# Patient Record
Sex: FEMALE | Race: WHITE | Employment: OTHER | URBAN - METROPOLITAN AREA
[De-identification: names, ages, dates, MRNs, and addresses within clinical notes are randomized per-mention and may not be internally consistent; named-entity substitution may affect disease eponyms.]

---

## 2018-10-10 ENCOUNTER — HOSPITAL ENCOUNTER (EMERGENCY)
Age: 63
Discharge: HOME | End: 2018-10-10
Attending: EMERGENCY MEDICINE
Payer: OTHER GOVERNMENT

## 2018-10-10 VITALS
HEIGHT: 65 IN | DIASTOLIC BLOOD PRESSURE: 88 MMHG | HEART RATE: 80 BPM | BODY MASS INDEX: 31.65 KG/M2 | SYSTOLIC BLOOD PRESSURE: 180 MMHG | OXYGEN SATURATION: 100 % | TEMPERATURE: 98.1 F | RESPIRATION RATE: 18 BRPM | WEIGHT: 190 LBS

## 2018-10-10 DIAGNOSIS — H10.213 CHEMICAL CONJUNCTIVITIS OF BOTH EYES: Primary | ICD-10-CM

## 2018-10-10 DIAGNOSIS — S80.01XA CONTUSION OF RIGHT KNEE, INITIAL ENCOUNTER: ICD-10-CM

## 2018-10-10 PROCEDURE — 99283 EMERGENCY DEPT VISIT LOW MDM: CPT

## 2018-10-10 RX ORDER — BACLOFEN 10 MG/1
10 TABLET ORAL NIGHTLY
COMMUNITY
End: 2021-03-11 | Stop reason: ALTCHOICE

## 2018-10-10 ASSESSMENT — PAIN DESCRIPTION - DESCRIPTORS: DESCRIPTORS: BURNING

## 2018-10-10 ASSESSMENT — PAIN SCALES - GENERAL: PAINLEVEL_OUTOF10: 8

## 2018-10-10 ASSESSMENT — PAIN DESCRIPTION - PAIN TYPE: TYPE: ACUTE PAIN

## 2019-02-09 ENCOUNTER — APPOINTMENT (OUTPATIENT)
Dept: GENERAL RADIOLOGY | Age: 64
End: 2019-02-09
Payer: OTHER GOVERNMENT

## 2019-02-09 ENCOUNTER — HOSPITAL ENCOUNTER (EMERGENCY)
Age: 64
Discharge: HOME | End: 2019-02-09
Payer: OTHER GOVERNMENT

## 2019-02-09 VITALS
RESPIRATION RATE: 18 BRPM | HEIGHT: 65 IN | TEMPERATURE: 98.1 F | DIASTOLIC BLOOD PRESSURE: 76 MMHG | WEIGHT: 190 LBS | OXYGEN SATURATION: 100 % | HEART RATE: 88 BPM | BODY MASS INDEX: 31.65 KG/M2 | SYSTOLIC BLOOD PRESSURE: 148 MMHG

## 2019-02-09 DIAGNOSIS — E86.0 DEHYDRATION: Primary | ICD-10-CM

## 2019-02-09 DIAGNOSIS — N28.9 RENAL INSUFFICIENCY: ICD-10-CM

## 2019-02-09 LAB
ALBUMIN SERPL-MCNC: 4.2 G/DL (ref 3.5–5.2)
ALP BLD-CCNC: 68 U/L (ref 35–104)
ALT SERPL-CCNC: 7 U/L (ref 0–32)
ANION GAP SERPL CALCULATED.3IONS-SCNC: 16 MMOL/L (ref 7–16)
AST SERPL-CCNC: 12 U/L (ref 0–31)
BACTERIA: ABNORMAL /HPF
BASOPHILS ABSOLUTE: 0.1 E9/L (ref 0–0.2)
BASOPHILS RELATIVE PERCENT: 0.9 % (ref 0–2)
BILIRUB SERPL-MCNC: 0.6 MG/DL (ref 0–1.2)
BILIRUBIN URINE: NEGATIVE
BLOOD, URINE: NEGATIVE
BUN BLDV-MCNC: 50 MG/DL (ref 8–23)
CALCIUM SERPL-MCNC: 10.2 MG/DL (ref 8.6–10.2)
CHLORIDE BLD-SCNC: 102 MMOL/L (ref 98–107)
CLARITY: CLEAR
CO2: 18 MMOL/L (ref 22–29)
COLOR: YELLOW
CREAT SERPL-MCNC: 1.9 MG/DL (ref 0.5–1)
D DIMER: 270 NG/ML DDU
EOSINOPHILS ABSOLUTE: 0.23 E9/L (ref 0.05–0.5)
EOSINOPHILS RELATIVE PERCENT: 2 % (ref 0–6)
GFR AFRICAN AMERICAN: 32
GFR NON-AFRICAN AMERICAN: 27 ML/MIN/1.73
GLUCOSE BLD-MCNC: 186 MG/DL (ref 74–99)
GLUCOSE URINE: NEGATIVE MG/DL
HCT VFR BLD CALC: 39.9 % (ref 34–48)
HEMOGLOBIN: 12.6 G/DL (ref 11.5–15.5)
IMMATURE GRANULOCYTES #: 0.03 E9/L
IMMATURE GRANULOCYTES %: 0.3 % (ref 0–5)
INFLUENZA A BY PCR: NOT DETECTED
INFLUENZA B BY PCR: NOT DETECTED
KETONES, URINE: ABNORMAL MG/DL
LACTIC ACID: 1.7 MMOL/L (ref 0.5–2.2)
LACTIC ACID: 2.7 MMOL/L (ref 0.5–2.2)
LEUKOCYTE ESTERASE, URINE: NEGATIVE
LIPASE: 25 U/L (ref 13–60)
LYMPHOCYTES ABSOLUTE: 2.13 E9/L (ref 1.5–4)
LYMPHOCYTES RELATIVE PERCENT: 19 % (ref 20–42)
MAGNESIUM: 1.7 MG/DL (ref 1.6–2.6)
MCH RBC QN AUTO: 28 PG (ref 26–35)
MCHC RBC AUTO-ENTMCNC: 31.6 % (ref 32–34.5)
MCV RBC AUTO: 88.7 FL (ref 80–99.9)
METER GLUCOSE: 190 MG/DL (ref 74–99)
MONOCYTES ABSOLUTE: 0.69 E9/L (ref 0.1–0.95)
MONOCYTES RELATIVE PERCENT: 6.1 % (ref 2–12)
NEUTROPHILS ABSOLUTE: 8.06 E9/L (ref 1.8–7.3)
NEUTROPHILS RELATIVE PERCENT: 71.7 % (ref 43–80)
NITRITE, URINE: NEGATIVE
PDW BLD-RTO: 13.9 FL (ref 11.5–15)
PH UA: 5 (ref 5–9)
PLATELET # BLD: 322 E9/L (ref 130–450)
PMV BLD AUTO: 11.8 FL (ref 7–12)
POTASSIUM SERPL-SCNC: 4.8 MMOL/L (ref 3.5–5)
PROTEIN UA: NEGATIVE MG/DL
RBC # BLD: 4.5 E12/L (ref 3.5–5.5)
RBC UA: ABNORMAL /HPF (ref 0–2)
SODIUM BLD-SCNC: 136 MMOL/L (ref 132–146)
SPECIFIC GRAVITY UA: 1.02 (ref 1–1.03)
TOTAL PROTEIN: 7.1 G/DL (ref 6.4–8.3)
TROPONIN: <0.01 NG/ML (ref 0–0.03)
UROBILINOGEN, URINE: 0.2 E.U./DL
WBC # BLD: 11.2 E9/L (ref 4.5–11.5)
WBC UA: ABNORMAL /HPF (ref 0–5)

## 2019-02-09 PROCEDURE — 71045 X-RAY EXAM CHEST 1 VIEW: CPT

## 2019-02-09 PROCEDURE — 36415 COLL VENOUS BLD VENIPUNCTURE: CPT

## 2019-02-09 PROCEDURE — 84484 ASSAY OF TROPONIN QUANT: CPT

## 2019-02-09 PROCEDURE — 96361 HYDRATE IV INFUSION ADD-ON: CPT

## 2019-02-09 PROCEDURE — 87502 INFLUENZA DNA AMP PROBE: CPT

## 2019-02-09 PROCEDURE — 99285 EMERGENCY DEPT VISIT HI MDM: CPT

## 2019-02-09 PROCEDURE — 85025 COMPLETE CBC W/AUTO DIFF WBC: CPT

## 2019-02-09 PROCEDURE — 83690 ASSAY OF LIPASE: CPT

## 2019-02-09 PROCEDURE — 81001 URINALYSIS AUTO W/SCOPE: CPT

## 2019-02-09 PROCEDURE — 82962 GLUCOSE BLOOD TEST: CPT

## 2019-02-09 PROCEDURE — 2580000003 HC RX 258: Performed by: PHYSICIAN ASSISTANT

## 2019-02-09 PROCEDURE — 83605 ASSAY OF LACTIC ACID: CPT

## 2019-02-09 PROCEDURE — 85378 FIBRIN DEGRADE SEMIQUANT: CPT

## 2019-02-09 PROCEDURE — 80053 COMPREHEN METABOLIC PANEL: CPT

## 2019-02-09 PROCEDURE — 96360 HYDRATION IV INFUSION INIT: CPT

## 2019-02-09 PROCEDURE — 83735 ASSAY OF MAGNESIUM: CPT

## 2019-02-09 RX ORDER — LISINOPRIL 10 MG/1
20 TABLET ORAL DAILY
COMMUNITY
End: 2022-11-03

## 2019-02-09 RX ORDER — 0.9 % SODIUM CHLORIDE 0.9 %
1000 INTRAVENOUS SOLUTION INTRAVENOUS ONCE
Status: COMPLETED | OUTPATIENT
Start: 2019-02-09 | End: 2019-02-09

## 2019-02-09 RX ADMIN — SODIUM CHLORIDE 1000 ML: 9 INJECTION, SOLUTION INTRAVENOUS at 20:01

## 2019-02-09 RX ADMIN — SODIUM CHLORIDE 1000 ML: 9 INJECTION, SOLUTION INTRAVENOUS at 18:17

## 2019-02-16 LAB
EKG ATRIAL RATE: 74 BPM
EKG P AXIS: 34 DEGREES
EKG P-R INTERVAL: 160 MS
EKG Q-T INTERVAL: 396 MS
EKG QRS DURATION: 74 MS
EKG QTC CALCULATION (BAZETT): 439 MS
EKG R AXIS: -25 DEGREES
EKG T AXIS: 48 DEGREES
EKG VENTRICULAR RATE: 74 BPM

## 2020-04-09 ENCOUNTER — NURSE TRIAGE (OUTPATIENT)
Dept: OTHER | Facility: CLINIC | Age: 65
End: 2020-04-09

## 2020-04-09 ENCOUNTER — APPOINTMENT (OUTPATIENT)
Dept: GENERAL RADIOLOGY | Age: 65
End: 2020-04-09
Payer: OTHER GOVERNMENT

## 2020-04-09 ENCOUNTER — TELEPHONE (OUTPATIENT)
Dept: ADMINISTRATIVE | Age: 65
End: 2020-04-09

## 2020-04-09 ENCOUNTER — HOSPITAL ENCOUNTER (EMERGENCY)
Age: 65
Discharge: HOME | End: 2020-04-09
Attending: FAMILY MEDICINE
Payer: OTHER GOVERNMENT

## 2020-04-09 VITALS
RESPIRATION RATE: 18 BRPM | HEIGHT: 66 IN | OXYGEN SATURATION: 100 % | SYSTOLIC BLOOD PRESSURE: 168 MMHG | DIASTOLIC BLOOD PRESSURE: 78 MMHG | WEIGHT: 172 LBS | BODY MASS INDEX: 27.64 KG/M2 | HEART RATE: 70 BPM | TEMPERATURE: 97.8 F

## 2020-04-09 LAB
ALBUMIN SERPL-MCNC: 4 G/DL (ref 3.5–5.2)
ALP BLD-CCNC: 82 U/L (ref 35–104)
ALT SERPL-CCNC: 7 U/L (ref 0–32)
ANION GAP SERPL CALCULATED.3IONS-SCNC: 14 MMOL/L (ref 7–16)
ANION GAP SERPL CALCULATED.3IONS-SCNC: 18 MMOL/L (ref 7–16)
AST SERPL-CCNC: 11 U/L (ref 0–31)
BACTERIA: ABNORMAL /HPF
BASOPHILS ABSOLUTE: 0.09 E9/L (ref 0–0.2)
BASOPHILS RELATIVE PERCENT: 0.9 % (ref 0–2)
BETA-HYDROXYBUTYRATE: <0.01 MMOL/L (ref 0.02–0.27)
BILIRUB SERPL-MCNC: 1.4 MG/DL (ref 0–1.2)
BILIRUBIN URINE: NEGATIVE
BLOOD, URINE: ABNORMAL
BUN BLDV-MCNC: 47 MG/DL (ref 8–23)
BUN BLDV-MCNC: 50 MG/DL (ref 8–23)
CALCIUM SERPL-MCNC: 8.8 MG/DL (ref 8.6–10.2)
CALCIUM SERPL-MCNC: 9.2 MG/DL (ref 8.6–10.2)
CHLORIDE BLD-SCNC: 88 MMOL/L (ref 98–107)
CHLORIDE BLD-SCNC: 97 MMOL/L (ref 98–107)
CHP ED QC CHECK: YES
CHP ED QC CHECK: YES
CLARITY: ABNORMAL
CO2: 23 MMOL/L (ref 22–29)
CO2: 24 MMOL/L (ref 22–29)
COLOR: ABNORMAL
CREAT SERPL-MCNC: 1.9 MG/DL (ref 0.5–1)
CREAT SERPL-MCNC: 2.1 MG/DL (ref 0.5–1)
EKG ATRIAL RATE: 80 BPM
EKG P AXIS: 64 DEGREES
EKG P-R INTERVAL: 188 MS
EKG Q-T INTERVAL: 386 MS
EKG QRS DURATION: 78 MS
EKG QTC CALCULATION (BAZETT): 442 MS
EKG R AXIS: -12 DEGREES
EKG T AXIS: 48 DEGREES
EKG VENTRICULAR RATE: 79 BPM
EOSINOPHILS ABSOLUTE: 0.14 E9/L (ref 0.05–0.5)
EOSINOPHILS RELATIVE PERCENT: 1.4 % (ref 0–6)
EPITHELIAL CELLS, UA: ABNORMAL /HPF
GFR AFRICAN AMERICAN: 29
GFR AFRICAN AMERICAN: 32
GFR NON-AFRICAN AMERICAN: 24 ML/MIN/1.73
GFR NON-AFRICAN AMERICAN: 27 ML/MIN/1.73
GLUCOSE BLD-MCNC: 287 MG/DL
GLUCOSE BLD-MCNC: 291 MG/DL (ref 74–99)
GLUCOSE BLD-MCNC: 486 MG/DL
GLUCOSE BLD-MCNC: 495 MG/DL (ref 74–99)
GLUCOSE URINE: >=1000 MG/DL
HCT VFR BLD CALC: 39.7 % (ref 34–48)
HEMOGLOBIN: 13.1 G/DL (ref 11.5–15.5)
HYALINE CASTS: ABNORMAL /LPF (ref 0–2)
IMMATURE GRANULOCYTES #: 0.02 E9/L
IMMATURE GRANULOCYTES %: 0.2 % (ref 0–5)
KETONES, URINE: 15 MG/DL
LACTIC ACID: 1.2 MMOL/L (ref 0.5–2.2)
LEUKOCYTE ESTERASE, URINE: NEGATIVE
LIPASE: 17 U/L (ref 13–60)
LYMPHOCYTES ABSOLUTE: 2.75 E9/L (ref 1.5–4)
LYMPHOCYTES RELATIVE PERCENT: 27.3 % (ref 20–42)
MCH RBC QN AUTO: 29.4 PG (ref 26–35)
MCHC RBC AUTO-ENTMCNC: 33 % (ref 32–34.5)
MCV RBC AUTO: 89 FL (ref 80–99.9)
METER GLUCOSE: 287 MG/DL (ref 74–99)
METER GLUCOSE: 486 MG/DL (ref 74–99)
MONOCYTES ABSOLUTE: 0.77 E9/L (ref 0.1–0.95)
MONOCYTES RELATIVE PERCENT: 7.7 % (ref 2–12)
NEUTROPHILS ABSOLUTE: 6.29 E9/L (ref 1.8–7.3)
NEUTROPHILS RELATIVE PERCENT: 62.5 % (ref 43–80)
NITRITE, URINE: NEGATIVE
PDW BLD-RTO: 13.2 FL (ref 11.5–15)
PH UA: 5 (ref 5–9)
PLATELET # BLD: 284 E9/L (ref 130–450)
PMV BLD AUTO: 11.9 FL (ref 7–12)
POTASSIUM SERPL-SCNC: 3.9 MMOL/L (ref 3.5–5)
POTASSIUM SERPL-SCNC: 4.6 MMOL/L (ref 3.5–5)
PROTEIN UA: ABNORMAL MG/DL
RBC # BLD: 4.46 E12/L (ref 3.5–5.5)
RBC UA: ABNORMAL /HPF (ref 0–2)
REASON FOR REJECTION: NORMAL
REJECTED TEST: NORMAL
SODIUM BLD-SCNC: 129 MMOL/L (ref 132–146)
SODIUM BLD-SCNC: 135 MMOL/L (ref 132–146)
SPECIFIC GRAVITY UA: 1.02 (ref 1–1.03)
TOTAL PROTEIN: 6.6 G/DL (ref 6.4–8.3)
TROPONIN: 0.01 NG/ML (ref 0–0.03)
UROBILINOGEN, URINE: 0.2 E.U./DL
WBC # BLD: 10.1 E9/L (ref 4.5–11.5)
WBC UA: ABNORMAL /HPF (ref 0–5)

## 2020-04-09 PROCEDURE — 71046 X-RAY EXAM CHEST 2 VIEWS: CPT

## 2020-04-09 PROCEDURE — 87088 URINE BACTERIA CULTURE: CPT

## 2020-04-09 PROCEDURE — 80048 BASIC METABOLIC PNL TOTAL CA: CPT

## 2020-04-09 PROCEDURE — 6360000002 HC RX W HCPCS: Performed by: NURSE PRACTITIONER

## 2020-04-09 PROCEDURE — 85025 COMPLETE CBC W/AUTO DIFF WBC: CPT

## 2020-04-09 PROCEDURE — 2580000003 HC RX 258: Performed by: NURSE PRACTITIONER

## 2020-04-09 PROCEDURE — 93005 ELECTROCARDIOGRAM TRACING: CPT | Performed by: NURSE PRACTITIONER

## 2020-04-09 PROCEDURE — 93010 ELECTROCARDIOGRAM REPORT: CPT | Performed by: INTERNAL MEDICINE

## 2020-04-09 PROCEDURE — 6370000000 HC RX 637 (ALT 250 FOR IP): Performed by: NURSE PRACTITIONER

## 2020-04-09 PROCEDURE — 83690 ASSAY OF LIPASE: CPT

## 2020-04-09 PROCEDURE — 96375 TX/PRO/DX INJ NEW DRUG ADDON: CPT

## 2020-04-09 PROCEDURE — 83605 ASSAY OF LACTIC ACID: CPT

## 2020-04-09 PROCEDURE — 80053 COMPREHEN METABOLIC PANEL: CPT

## 2020-04-09 PROCEDURE — 84484 ASSAY OF TROPONIN QUANT: CPT

## 2020-04-09 PROCEDURE — 81001 URINALYSIS AUTO W/SCOPE: CPT

## 2020-04-09 PROCEDURE — 36415 COLL VENOUS BLD VENIPUNCTURE: CPT

## 2020-04-09 PROCEDURE — 94760 N-INVAS EAR/PLS OXIMETRY 1: CPT

## 2020-04-09 PROCEDURE — 99284 EMERGENCY DEPT VISIT MOD MDM: CPT

## 2020-04-09 PROCEDURE — 82962 GLUCOSE BLOOD TEST: CPT

## 2020-04-09 PROCEDURE — 82010 KETONE BODYS QUAN: CPT

## 2020-04-09 PROCEDURE — 96365 THER/PROPH/DIAG IV INF INIT: CPT

## 2020-04-09 RX ORDER — 0.9 % SODIUM CHLORIDE 0.9 %
1000 INTRAVENOUS SOLUTION INTRAVENOUS ONCE
Status: COMPLETED | OUTPATIENT
Start: 2020-04-09 | End: 2020-04-09

## 2020-04-09 RX ORDER — INSULIN GLARGINE 100 [IU]/ML
INJECTION, SOLUTION SUBCUTANEOUS NIGHTLY
COMMUNITY
End: 2021-03-11 | Stop reason: ALTCHOICE

## 2020-04-09 RX ORDER — CEFDINIR 300 MG/1
300 CAPSULE ORAL 2 TIMES DAILY
Qty: 10 CAPSULE | Refills: 0 | Status: SHIPPED | OUTPATIENT
Start: 2020-04-09 | End: 2020-04-14

## 2020-04-09 RX ADMIN — SODIUM CHLORIDE 1000 ML: 9 INJECTION, SOLUTION INTRAVENOUS at 15:16

## 2020-04-09 RX ADMIN — CEFTRIAXONE SODIUM 1 G: 1 INJECTION, POWDER, FOR SOLUTION INTRAMUSCULAR; INTRAVENOUS at 17:37

## 2020-04-09 RX ADMIN — INSULIN HUMAN 6 UNITS: 100 INJECTION, SOLUTION PARENTERAL at 16:53

## 2020-04-09 RX ADMIN — SODIUM CHLORIDE 1000 ML: 9 INJECTION, SOLUTION INTRAVENOUS at 16:42

## 2020-04-09 NOTE — ED PROVIDER NOTES
13.1 11.5 - 15.5 g/dL    Hematocrit 39.7 34.0 - 48.0 %    MCV 89.0 80.0 - 99.9 fL    MCH 29.4 26.0 - 35.0 pg    MCHC 33.0 32.0 - 34.5 %    RDW 13.2 11.5 - 15.0 fL    Platelets 149 174 - 179 E9/L    MPV 11.9 7.0 - 12.0 fL    Neutrophils % 62.5 43.0 - 80.0 %    Immature Granulocytes % 0.2 0.0 - 5.0 %    Lymphocytes % 27.3 20.0 - 42.0 %    Monocytes % 7.7 2.0 - 12.0 %    Eosinophils % 1.4 0.0 - 6.0 %    Basophils % 0.9 0.0 - 2.0 %    Neutrophils Absolute 6.29 1.80 - 7.30 E9/L    Immature Granulocytes # 0.02 E9/L    Lymphocytes Absolute 2.75 1.50 - 4.00 E9/L    Monocytes Absolute 0.77 0.10 - 0.95 E9/L    Eosinophils Absolute 0.14 0.05 - 0.50 E9/L    Basophils Absolute 0.09 0.00 - 0.20 E9/L   Urinalysis   Result Value Ref Range    Color, UA DARK YELLOW (A) Straw/Yellow    Clarity, UA SL CLOUDY Clear    Glucose, Ur >=1000 (A) Negative mg/dL    Bilirubin Urine Negative Negative    Ketones, Urine 15 (A) Negative mg/dL    Specific Gravity, UA 1.025 1.005 - 1.030    Blood, Urine TRACE (A) Negative    pH, UA 5.0 5.0 - 9.0    Protein, UA TRACE Negative mg/dL    Urobilinogen, Urine 0.2 <2.0 E.U./dL    Nitrite, Urine Negative Negative    Leukocyte Esterase, Urine Negative Negative   Microscopic Urinalysis   Result Value Ref Range    Hyaline Casts, UA 6-10 (A) 0 - 2 /LPF    WBC, UA 5-10 (A) 0 - 5 /HPF    RBC, UA 2-5 0 - 2 /HPF    Epithelial Cells, UA MODERATE /HPF    Bacteria, UA MODERATE (A) None Seen /HPF   SPECIMEN REJECTION   Result Value Ref Range    Rejected Test cmplipastroplac     Reason for Rejection see below    Troponin   Result Value Ref Range    Troponin 0.01 0.00 - 0.03 ng/mL   Beta-Hydroxybutyrate   Result Value Ref Range    Beta-Hydroxybutyrate <0.01 (L) 0.02 - 0.27 mmol/L   Comprehensive Metabolic Panel   Result Value Ref Range    Sodium 129 (L) 132 - 146 mmol/L    Potassium 4.6 3.5 - 5.0 mmol/L    Chloride 88 (L) 98 - 107 mmol/L    CO2 23 22 - 29 mmol/L    Anion Gap 18 (H) 7 - 16 mmol/L    Glucose 495 (H) 74 - 99

## 2020-04-12 LAB — URINE CULTURE, ROUTINE: NORMAL

## 2020-07-09 ENCOUNTER — OFFICE VISIT (OUTPATIENT)
Dept: VASCULAR SURGERY | Age: 65
End: 2020-07-09
Payer: MEDICARE

## 2020-07-09 ENCOUNTER — TELEPHONE (OUTPATIENT)
Dept: VASCULAR SURGERY | Age: 65
End: 2020-07-09

## 2020-07-09 VITALS
DIASTOLIC BLOOD PRESSURE: 51 MMHG | BODY MASS INDEX: 23.99 KG/M2 | WEIGHT: 144 LBS | SYSTOLIC BLOOD PRESSURE: 98 MMHG | HEART RATE: 81 BPM | HEIGHT: 65 IN

## 2020-07-09 PROBLEM — R09.89 BRUIT OF LEFT CAROTID ARTERY: Status: ACTIVE | Noted: 2020-07-09

## 2020-07-09 PROBLEM — I65.22 LEFT CAROTID STENOSIS: Status: ACTIVE | Noted: 2020-07-09

## 2020-07-09 PROBLEM — R09.89 DECREASED DORSALIS PEDIS PULSE: Status: ACTIVE | Noted: 2020-07-09

## 2020-07-09 PROCEDURE — 99204 OFFICE O/P NEW MOD 45 MIN: CPT | Performed by: SURGERY

## 2020-07-09 NOTE — TELEPHONE ENCOUNTER
Notified patient of lower extremity arterial doppler study at [address] [place] [address] Dr Marx on 7-28-20 at 8:00 am.  [place] at 7:30 am.

## 2020-07-09 NOTE — PROGRESS NOTES
Chief Complaint:   Chief Complaint   Patient presents with    New Patient     Carotid stenosis-US done          HPI: Patient came to the office with her , for the evaluation of abnormal carotid ultrasound that was done 4 months ago that revealed approximately 40 to 60% stenosis, patient was recommended a follow-up evaluation 1 year, concerned and came here    Patient also has multiple complaints and symptoms involving the legs, including cramping of the toes and ankles, burning sensation in the middle of the night or the medial aspect of the thighs, denies any history of back problems     Patient does give history of diabetes      Patient denies any focal lateralizing neurological symptoms like loss of speech, vision or loss of function of extremity    Patient can walk a few blocks slowly, and denies any symptoms of rest pain    Allergies   Allergen Reactions    Synvisc [Hylan G-F 20]      Local swelling       Current Outpatient Medications   Medication Sig Dispense Refill    Liraglutide (VICTOZA SC) Inject into the skin daily      insulin glargine (LANTUS) 100 UNIT/ML injection vial Inject into the skin nightly Takes 18-20 units at bedtime      lisinopril (PRINIVIL;ZESTRIL) 10 MG tablet Take 5 mg by mouth daily       vitamin D (CHOLECALCIFEROL) 1000 UNIT TABS tablet Take 1,000 Units by mouth daily      HYDROcodone-acetaminophen (NORCO)  MG per tablet   Take 1 tablet by mouth every 6 hours as needed for Pain Instructed to take am of procedure if needed      Pediatric Multivitamins-Fl (MULTI VIT/FL PO) Take  by mouth.  paroxetine (PAXIL) 20 MG tablet   Take 20 mg by mouth every morning Instructed to take am of procedure      atenolol (TENORMIN) 25 MG tablet   Take 25 mg by mouth daily Instructed to take am of procedure      alprazolam (XANAX) 1 MG tablet   Take 1 mg by mouth 2 times daily Instructed to take am of procedure      furosemide (LASIX) 20 MG tablet Take 20 mg by mouth daily.  aspirin 81 MG EC tablet Take 81 mg by mouth daily.  baclofen (LIORESAL) 10 MG tablet Take 10 mg by mouth nightly        No current facility-administered medications for this visit. Past Medical History:   Diagnosis Date    Anxiety     Bruit of left carotid artery 7/9/2020    Decreased dorsalis pedis pulse 7/9/2020    Humeral fracture     right    Hypertension     Left carotid stenosis 7/9/2020    Osteoarthritis of right knee 5/4/2011    Type II or unspecified type diabetes mellitus without mention of complication, not stated as uncontrolled        Past Surgical History:   Procedure Laterality Date    APPENDECTOMY      CHOLECYSTECTOMY      FRACTURE SURGERY Right     arm    KNEE ARTHROPLASTY Right 2011    Right TKA. Adam Guajardo MD       Family History   Problem Relation Age of Onset    Diabetes Mother     Arthritis Father     Diabetes Father        Social History     Socioeconomic History    Marital status:      Spouse name: Not on file    Number of children: Not on file    Years of education: Not on file    Highest education level: Not on file   Occupational History    Not on file   Social Needs    Financial resource strain: Not on file    Food insecurity     Worry: Not on file     Inability: Not on file    Transportation needs     Medical: Not on file     Non-medical: Not on file   Tobacco Use    Smoking status: Never Smoker    Smokeless tobacco: Never Used   Substance and Sexual Activity    Alcohol use:  Yes     Alcohol/week: 0.0 standard drinks     Comment: occasional     Drug use: No    Sexual activity: Not on file   Lifestyle    Physical activity     Days per week: Not on file     Minutes per session: Not on file    Stress: Not on file   Relationships    Social connections     Talks on phone: Not on file     Gets together: Not on file     Attends [employer] service: Not on file     Active member of club or organization: Not on file     Attends 2  2=diminished   Popliteal    1=barely palpable   Dorsalis pedis 1 1  0=absent   Posterior tibial    4=aneurysmal             Other pertinent information:1. The past medical records were reviewed. 2.  Recent carotid ultrasound done at the [facility] in [place] revealed normal study on the right, 40 to 60% stenosis on the left side    Assessment:    1. Left carotid stenosis    2. Bruit of left carotid artery    3. Decreased dorsalis pedis pulse              Plan:       I had a detailed discussion the patient and the , the patient is asymptomatic, patient was reassured, recommended to continue the low-dose aspirin and call me if any neurologic symptoms and follow-up evaluation 1 year from the last ultrasound study     And as the patient does have diminished pulses in the feet, patient recommended lower extremity artery Doppler study for the documentation          Patient was instructed to continue walking program and to call if any worsening of symptoms and to call if any focal lateralizing neurological symptoms like loss of speech, vision or loss of function of extremity. All the questions were answered. Orders Placed This Encounter   Procedures    VL LOWER EXTREMITY ARTERIAL SEGMENTAL PRESSURES W PPG BILATERAL             Indicated follow-up: Return in about 8 months (around 3/9/2021), or if symptoms worsen or fail to improve.

## 2020-07-28 ENCOUNTER — HOSPITAL ENCOUNTER (OUTPATIENT)
Dept: INTERVENTIONAL RADIOLOGY/VASCULAR | Age: 65
Discharge: HOME | End: 2020-07-30
Payer: MEDICARE

## 2020-07-28 PROCEDURE — 93923 UPR/LXTR ART STDY 3+ LVLS: CPT

## 2020-07-30 ENCOUNTER — TELEPHONE (OUTPATIENT)
Dept: VASCULAR SURGERY | Age: 65
End: 2020-07-30

## 2020-07-30 NOTE — TELEPHONE ENCOUNTER
Discussed with patient regarding ankle-brachial index, normal good arterial flow to both feet, patient was reassured, to call me PRN, all her questions were answered

## 2021-02-22 ENCOUNTER — HOSPITAL ENCOUNTER (OUTPATIENT)
Age: 66
Discharge: HOME | End: 2021-02-22
Payer: MEDICARE

## 2021-02-22 LAB
ANION GAP SERPL CALCULATED.3IONS-SCNC: 7 MMOL/L (ref 7–16)
BUN BLDV-MCNC: 33 MG/DL (ref 8–23)
CALCIUM SERPL-MCNC: 9.2 MG/DL (ref 8.6–10.2)
CHLORIDE BLD-SCNC: 98 MMOL/L (ref 98–107)
CO2: 27 MMOL/L (ref 22–29)
CREAT SERPL-MCNC: 1.2 MG/DL (ref 0.5–1)
GFR AFRICAN AMERICAN: 54
GFR NON-AFRICAN AMERICAN: 45 ML/MIN/1.73
GLUCOSE BLD-MCNC: 338 MG/DL (ref 74–99)
POTASSIUM SERPL-SCNC: 4.6 MMOL/L (ref 3.5–5)
SODIUM BLD-SCNC: 132 MMOL/L (ref 132–146)

## 2021-02-22 PROCEDURE — 80048 BASIC METABOLIC PNL TOTAL CA: CPT

## 2021-02-22 PROCEDURE — 36415 COLL VENOUS BLD VENIPUNCTURE: CPT

## 2021-03-11 ENCOUNTER — OFFICE VISIT (OUTPATIENT)
Dept: VASCULAR SURGERY | Age: 66
End: 2021-03-11
Payer: MEDICARE

## 2021-03-11 VITALS — HEIGHT: 65 IN | WEIGHT: 145 LBS | BODY MASS INDEX: 24.16 KG/M2

## 2021-03-11 DIAGNOSIS — I65.22 LEFT CAROTID STENOSIS: Primary | ICD-10-CM

## 2021-03-11 DIAGNOSIS — R09.89 BRUIT OF LEFT CAROTID ARTERY: ICD-10-CM

## 2021-03-11 PROCEDURE — 99213 OFFICE O/P EST LOW 20 MIN: CPT | Performed by: SURGERY

## 2021-03-11 RX ORDER — CILOSTAZOL 100 MG/1
100 TABLET ORAL 2 TIMES DAILY
COMMUNITY
End: 2021-10-07

## 2021-03-11 RX ORDER — MAGNESIUM OXIDE 400 MG/1
400 TABLET ORAL DAILY
COMMUNITY

## 2021-03-11 RX ORDER — METHOCARBAMOL 500 MG/1
500 TABLET, FILM COATED ORAL 4 TIMES DAILY
COMMUNITY
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
## 2021-03-12 ENCOUNTER — HOSPITAL ENCOUNTER (OUTPATIENT)
Age: 66
Discharge: HOME | End: 2021-03-12
Payer: MEDICARE

## 2021-03-12 ENCOUNTER — HOSPITAL ENCOUNTER (OUTPATIENT)
Dept: ULTRASOUND IMAGING | Age: 66
Discharge: HOME | End: 2021-03-14
Payer: MEDICARE

## 2021-03-12 DIAGNOSIS — N18.31 STAGE 3A CHRONIC KIDNEY DISEASE (HCC): ICD-10-CM

## 2021-03-12 DIAGNOSIS — I12.9 HYPERTENSIVE CHRONIC KIDNEY DISEASE, UNSPECIFIED CKD STAGE: ICD-10-CM

## 2021-03-12 DIAGNOSIS — I10 ESSENTIAL HYPERTENSION: ICD-10-CM

## 2021-03-12 LAB
ALBUMIN SERPL-MCNC: 3.9 G/DL (ref 3.5–5.2)
ALP BLD-CCNC: 74 U/L (ref 35–104)
ALT SERPL-CCNC: 15 U/L (ref 0–32)
ANION GAP SERPL CALCULATED.3IONS-SCNC: 10 MMOL/L (ref 7–16)
AST SERPL-CCNC: 16 U/L (ref 0–31)
BASOPHILS ABSOLUTE: 0.05 E9/L (ref 0–0.2)
BASOPHILS RELATIVE PERCENT: 0.9 % (ref 0–2)
BILIRUB SERPL-MCNC: 0.4 MG/DL (ref 0–1.2)
BUN BLDV-MCNC: 26 MG/DL (ref 8–23)
CALCIUM SERPL-MCNC: 8.9 MG/DL (ref 8.6–10.2)
CHLORIDE BLD-SCNC: 99 MMOL/L (ref 98–107)
CO2: 24 MMOL/L (ref 22–29)
CREAT SERPL-MCNC: 1.1 MG/DL (ref 0.5–1)
CREATININE URINE: 79 MG/DL (ref 29–226)
EOSINOPHILS ABSOLUTE: 0.34 E9/L (ref 0.05–0.5)
EOSINOPHILS RELATIVE PERCENT: 6.2 % (ref 0–6)
GFR AFRICAN AMERICAN: >60
GFR NON-AFRICAN AMERICAN: 50 ML/MIN/1.73
GLUCOSE BLD-MCNC: 281 MG/DL (ref 74–99)
HCT VFR BLD CALC: 34.8 % (ref 34–48)
HEMOGLOBIN: 11 G/DL (ref 11.5–15.5)
IMMATURE GRANULOCYTES #: 0.02 E9/L
IMMATURE GRANULOCYTES %: 0.4 % (ref 0–5)
LYMPHOCYTES ABSOLUTE: 1.22 E9/L (ref 1.5–4)
LYMPHOCYTES RELATIVE PERCENT: 22.3 % (ref 20–42)
MAGNESIUM: 2.1 MG/DL (ref 1.6–2.6)
MCH RBC QN AUTO: 29.1 PG (ref 26–35)
MCHC RBC AUTO-ENTMCNC: 31.6 % (ref 32–34.5)
MCV RBC AUTO: 92.1 FL (ref 80–99.9)
MONOCYTES ABSOLUTE: 0.37 E9/L (ref 0.1–0.95)
MONOCYTES RELATIVE PERCENT: 6.8 % (ref 2–12)
NEUTROPHILS ABSOLUTE: 3.46 E9/L (ref 1.8–7.3)
NEUTROPHILS RELATIVE PERCENT: 63.4 % (ref 43–80)
PDW BLD-RTO: 14.1 FL (ref 11.5–15)
PHOSPHORUS: 3.7 MG/DL (ref 2.5–4.5)
PLATELET # BLD: 299 E9/L (ref 130–450)
PMV BLD AUTO: 10.6 FL (ref 7–12)
POTASSIUM SERPL-SCNC: 5.2 MMOL/L (ref 3.5–5)
PROTEIN PROTEIN: 8 MG/DL (ref 0–12)
PROTEIN/CREAT RATIO: 0.1
PROTEIN/CREAT RATIO: 0.1 (ref 0–0.2)
RBC # BLD: 3.78 E12/L (ref 3.5–5.5)
SODIUM BLD-SCNC: 133 MMOL/L (ref 132–146)
TOTAL PROTEIN: 5.8 G/DL (ref 6.4–8.3)
URIC ACID, SERUM: 5 MG/DL (ref 2.4–5.7)
WBC # BLD: 5.5 E9/L (ref 4.5–11.5)

## 2021-03-12 PROCEDURE — 80053 COMPREHEN METABOLIC PANEL: CPT

## 2021-03-12 PROCEDURE — 36415 COLL VENOUS BLD VENIPUNCTURE: CPT

## 2021-03-12 PROCEDURE — 93975 VASCULAR STUDY: CPT

## 2021-03-12 PROCEDURE — 85025 COMPLETE CBC W/AUTO DIFF WBC: CPT

## 2021-03-12 PROCEDURE — 84550 ASSAY OF BLOOD/URIC ACID: CPT

## 2021-03-12 PROCEDURE — 93975 VASCULAR STUDY: CPT | Performed by: RADIOLOGY

## 2021-03-12 PROCEDURE — 84156 ASSAY OF PROTEIN URINE: CPT

## 2021-03-12 PROCEDURE — 76770 US EXAM ABDO BACK WALL COMP: CPT | Performed by: RADIOLOGY

## 2021-03-12 PROCEDURE — 84100 ASSAY OF PHOSPHORUS: CPT

## 2021-03-12 PROCEDURE — 83735 ASSAY OF MAGNESIUM: CPT

## 2021-03-12 PROCEDURE — 82570 ASSAY OF URINE CREATININE: CPT

## 2021-03-12 PROCEDURE — 76770 US EXAM ABDO BACK WALL COMP: CPT

## 2021-04-01 ENCOUNTER — TELEPHONE (OUTPATIENT)
Dept: VASCULAR SURGERY | Age: 66
End: 2021-04-01

## 2021-04-05 ENCOUNTER — HOSPITAL ENCOUNTER (OUTPATIENT)
Dept: CARDIOLOGY | Age: 66
Discharge: HOME | End: 2021-04-05
Payer: MEDICARE

## 2021-04-05 DIAGNOSIS — I65.22 LEFT CAROTID STENOSIS: ICD-10-CM

## 2021-04-05 PROCEDURE — 93880 EXTRACRANIAL BILAT STUDY: CPT

## 2021-04-07 ENCOUNTER — TELEPHONE (OUTPATIENT)
Dept: VASCULAR SURGERY | Age: 66
End: 2021-04-07

## 2021-04-08 ENCOUNTER — TELEPHONE (OUTPATIENT)
Dept: VASCULAR SURGERY | Age: 66
End: 2021-04-08

## 2021-08-11 ENCOUNTER — TELEPHONE (OUTPATIENT)
Dept: VASCULAR SURGERY | Age: 66
End: 2021-08-11

## 2021-08-12 ENCOUNTER — OFFICE VISIT (OUTPATIENT)
Dept: VASCULAR SURGERY | Age: 66
End: 2021-08-12
Payer: MEDICARE

## 2021-08-12 ENCOUNTER — TELEPHONE (OUTPATIENT)
Dept: VASCULAR SURGERY | Age: 66
End: 2021-08-12

## 2021-08-12 VITALS — WEIGHT: 146 LBS | HEIGHT: 65 IN | BODY MASS INDEX: 24.32 KG/M2

## 2021-08-12 DIAGNOSIS — R09.89 DECREASED DORSALIS PEDIS PULSE: Primary | ICD-10-CM

## 2021-08-12 DIAGNOSIS — I80.01 SUPERFICIAL PHLEBITIS OF LEG, RIGHT: ICD-10-CM

## 2021-08-12 DIAGNOSIS — I83.811 VARICOSE VEINS OF LEG WITH PAIN, RIGHT: ICD-10-CM

## 2021-08-12 DIAGNOSIS — M79.662 PAIN IN BOTH LOWER LEGS: ICD-10-CM

## 2021-08-12 DIAGNOSIS — M79.661 PAIN IN BOTH LOWER LEGS: ICD-10-CM

## 2021-08-12 PROCEDURE — 99214 OFFICE O/P EST MOD 30 MIN: CPT | Performed by: SURGERY
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
## 2021-08-12 NOTE — TELEPHONE ENCOUNTER
Notified patient of ultrasound and ARMANDO at [facility] on Friday, 9-3-21 at 10:30 am.  [place] at 10:00 am.

## 2021-09-03 ENCOUNTER — HOSPITAL ENCOUNTER (OUTPATIENT)
Dept: INTERVENTIONAL RADIOLOGY/VASCULAR | Age: 66
Discharge: HOME | End: 2021-09-05
Payer: MEDICARE

## 2021-09-03 ENCOUNTER — HOSPITAL ENCOUNTER (OUTPATIENT)
Dept: ULTRASOUND IMAGING | Age: 66
Discharge: HOME | End: 2021-09-05
Payer: MEDICARE

## 2021-09-03 DIAGNOSIS — M79.662 PAIN IN BOTH LOWER LEGS: ICD-10-CM

## 2021-09-03 DIAGNOSIS — R09.89 DECREASED DORSALIS PEDIS PULSE: ICD-10-CM

## 2021-09-03 DIAGNOSIS — I80.01 SUPERFICIAL PHLEBITIS OF LEG, RIGHT: ICD-10-CM

## 2021-09-03 DIAGNOSIS — M79.661 PAIN IN BOTH LOWER LEGS: ICD-10-CM

## 2021-09-03 PROCEDURE — 93970 EXTREMITY STUDY: CPT | Performed by: RADIOLOGY

## 2021-09-03 PROCEDURE — 93922 UPR/L XTREMITY ART 2 LEVELS: CPT

## 2021-09-03 PROCEDURE — 93970 EXTREMITY STUDY: CPT

## 2021-09-07 ENCOUNTER — TELEPHONE (OUTPATIENT)
Dept: VASCULAR SURGERY | Age: 66
End: 2021-09-07

## 2021-09-07 NOTE — TELEPHONE ENCOUNTER
Discussed the patient regarding the vascular ultrasound testing, ankle-brachial index normal, venous ultrasound no DVT, small Baker's cyst, informed her, cramping in the thighs not related to vascular etiology, to discuss with her PCP, undergo complete musculoskeletal, spine evaluation along with electrolytes and minerals evaluation, call as needed

## 2021-09-17 ENCOUNTER — HOSPITAL ENCOUNTER (OUTPATIENT)
Age: 66
Discharge: HOME | End: 2021-09-17
Payer: MEDICARE

## 2021-09-17 LAB
ALBUMIN SERPL-MCNC: 3.8 G/DL (ref 3.5–5.2)
ALP BLD-CCNC: 95 U/L (ref 35–104)
ALT SERPL-CCNC: 18 U/L (ref 0–32)
ANION GAP SERPL CALCULATED.3IONS-SCNC: 10 MMOL/L (ref 7–16)
AST SERPL-CCNC: 18 U/L (ref 0–31)
BASOPHILS ABSOLUTE: 0.05 E9/L (ref 0–0.2)
BASOPHILS RELATIVE PERCENT: 0.6 % (ref 0–2)
BILIRUB SERPL-MCNC: 0.5 MG/DL (ref 0–1.2)
BUN BLDV-MCNC: 23 MG/DL (ref 6–23)
CALCIUM SERPL-MCNC: 9.1 MG/DL (ref 8.6–10.2)
CHLORIDE BLD-SCNC: 100 MMOL/L (ref 98–107)
CO2: 25 MMOL/L (ref 22–29)
CREAT SERPL-MCNC: 1.1 MG/DL (ref 0.5–1)
CREATININE URINE: 44 MG/DL (ref 29–226)
EOSINOPHILS ABSOLUTE: 0.1 E9/L (ref 0.05–0.5)
EOSINOPHILS RELATIVE PERCENT: 1.3 % (ref 0–6)
GFR AFRICAN AMERICAN: >60
GFR NON-AFRICAN AMERICAN: 50 ML/MIN/1.73
GLUCOSE BLD-MCNC: 231 MG/DL (ref 74–99)
HCT VFR BLD CALC: 34.8 % (ref 34–48)
HEMOGLOBIN: 11.1 G/DL (ref 11.5–15.5)
IMMATURE GRANULOCYTES #: 0.04 E9/L
IMMATURE GRANULOCYTES %: 0.5 % (ref 0–5)
LYMPHOCYTES ABSOLUTE: 1.34 E9/L (ref 1.5–4)
LYMPHOCYTES RELATIVE PERCENT: 17.2 % (ref 20–42)
MAGNESIUM: 2.1 MG/DL (ref 1.6–2.6)
MCH RBC QN AUTO: 29.6 PG (ref 26–35)
MCHC RBC AUTO-ENTMCNC: 31.9 % (ref 32–34.5)
MCV RBC AUTO: 92.8 FL (ref 80–99.9)
MONOCYTES ABSOLUTE: 0.56 E9/L (ref 0.1–0.95)
MONOCYTES RELATIVE PERCENT: 7.2 % (ref 2–12)
NEUTROPHILS ABSOLUTE: 5.69 E9/L (ref 1.8–7.3)
NEUTROPHILS RELATIVE PERCENT: 73.2 % (ref 43–80)
PDW BLD-RTO: 13.2 FL (ref 11.5–15)
PHOSPHORUS: 3.5 MG/DL (ref 2.5–4.5)
PLATELET # BLD: 332 E9/L (ref 130–450)
PMV BLD AUTO: 10.3 FL (ref 7–12)
POTASSIUM SERPL-SCNC: 5 MMOL/L (ref 3.5–5)
PROTEIN PROTEIN: 8 MG/DL (ref 0–12)
PROTEIN/CREAT RATIO: 0.2
PROTEIN/CREAT RATIO: 0.2 (ref 0–0.2)
RBC # BLD: 3.75 E12/L (ref 3.5–5.5)
SODIUM BLD-SCNC: 135 MMOL/L (ref 132–146)
TOTAL PROTEIN: 6.4 G/DL (ref 6.4–8.3)
URIC ACID, SERUM: 5 MG/DL (ref 2.4–5.7)
WBC # BLD: 7.8 E9/L (ref 4.5–11.5)

## 2021-09-17 PROCEDURE — 84100 ASSAY OF PHOSPHORUS: CPT

## 2021-09-17 PROCEDURE — 84550 ASSAY OF BLOOD/URIC ACID: CPT

## 2021-09-17 PROCEDURE — 80053 COMPREHEN METABOLIC PANEL: CPT

## 2021-09-17 PROCEDURE — 82570 ASSAY OF URINE CREATININE: CPT

## 2021-09-17 PROCEDURE — 85025 COMPLETE CBC W/AUTO DIFF WBC: CPT

## 2021-09-17 PROCEDURE — 84156 ASSAY OF PROTEIN URINE: CPT

## 2021-09-17 PROCEDURE — 36415 COLL VENOUS BLD VENIPUNCTURE: CPT

## 2021-09-17 PROCEDURE — 83735 ASSAY OF MAGNESIUM: CPT

## 2021-10-06 ENCOUNTER — TELEPHONE (OUTPATIENT)
Dept: VASCULAR SURGERY | Age: 66
End: 2021-10-06

## 2021-10-07 ENCOUNTER — TELEPHONE (OUTPATIENT)
Dept: VASCULAR SURGERY | Age: 66
End: 2021-10-07

## 2021-10-07 ENCOUNTER — OFFICE VISIT (OUTPATIENT)
Dept: VASCULAR SURGERY | Age: 66
End: 2021-10-07
Payer: MEDICARE

## 2021-10-07 VITALS — WEIGHT: 148 LBS | BODY MASS INDEX: 24.66 KG/M2 | HEIGHT: 65 IN

## 2021-10-07 DIAGNOSIS — M79.662 PAIN IN BOTH LOWER LEGS: ICD-10-CM

## 2021-10-07 DIAGNOSIS — M79.661 PAIN IN BOTH LOWER LEGS: ICD-10-CM

## 2021-10-07 DIAGNOSIS — I65.23 BILATERAL CAROTID ARTERY STENOSIS: ICD-10-CM

## 2021-10-07 DIAGNOSIS — I80.01 SUPERFICIAL PHLEBITIS OF LEG, RIGHT: ICD-10-CM

## 2021-10-07 DIAGNOSIS — I83.811 VARICOSE VEINS OF LEG WITH PAIN, RIGHT: Primary | ICD-10-CM

## 2021-10-07 PROCEDURE — 99214 OFFICE O/P EST MOD 30 MIN: CPT | Performed by: SURGERY

## 2021-10-07 NOTE — TELEPHONE ENCOUNTER
Notified patient of carotid ultrasound at [facility] on Tuesday, 10-26-21 at 1:00 pm, register at 12:30 pm.

## 2021-10-07 NOTE — PROGRESS NOTES
Chief Complaint:   Chief Complaint   Patient presents with    Follow-up     go over letty         HPI: Patient came to the office for the evaluation of multiple vascular issues including bilateral carotid artery disease also history of aching in the leg, pain, history of superficial thrombophlebitis after vein procedures done at the vein center, does not want to go back there for follow-up    Overall the legs are better, with a superficial thrombophlebitis resolving with less pain          Patient denies any focal lateralizing neurological symptoms like loss of speech, vision or loss of function of extremity    Patient can walk a few blocks , and denies any symptoms of rest pain    Allergies   Allergen Reactions    Synvisc [Hylan G-F 20]      Local swelling       Current Outpatient Medications   Medication Sig Dispense Refill    magnesium oxide (MAG-OX) 400 MG tablet Take 400 mg by mouth daily      methocarbamol (ROBAXIN) 500 MG tablet Take 500 mg by mouth 4 times daily      insulin 70-30 (NOVOLIN 70/30) (70-30) 100 UNIT per ML injection vial Inject into the skin 2 times daily      cilostazol (PLETAL) 100 MG tablet Take 100 mg by mouth 2 times daily      lisinopril (PRINIVIL;ZESTRIL) 10 MG tablet Take 20 mg by mouth daily       Pediatric Multivitamins-Fl (MULTI VIT/FL PO) Take  by mouth.  alprazolam (XANAX) 1 MG tablet   Take 1 mg by mouth 2 times daily Instructed to take am of procedure      furosemide (LASIX) 20 MG tablet Take 20 mg by mouth daily.  aspirin 81 MG EC tablet Take 81 mg by mouth daily. No current facility-administered medications for this visit.        Past Medical History:   Diagnosis Date    Anxiety     Bilateral carotid artery stenosis 10/7/2021    Bruit of left carotid artery 7/9/2020    Decreased dorsalis pedis pulse 7/9/2020    Humeral fracture     right    Hypertension     Kidney disease     Left carotid stenosis 7/9/2020    Leg pain     Osteoarthritis of right knee 5/4/2011    Superficial phlebitis of leg, right 8/12/2021    Type II or unspecified type diabetes mellitus without mention of complication, not stated as uncontrolled     Varicose veins of leg with pain, right 8/12/2021       Past Surgical History:   Procedure Laterality Date    APPENDECTOMY      CHOLECYSTECTOMY      FRACTURE SURGERY Right     arm    KNEE ARTHROPLASTY Right 2011    Right TKA. Az Rehman MD       Family History   Problem Relation Age of Onset    Diabetes Mother     Arthritis Father     Diabetes Father        Social History     Socioeconomic History    Marital status:      Spouse name: Not on file    Number of children: Not on file    Years of education: Not on file    Highest education level: Not on file   Occupational History    Not on file   Tobacco Use    Smoking status: Current Some Day Smoker    Smokeless tobacco: Never Used   Vaping Use    Vaping Use: Never used   Substance and Sexual Activity    Alcohol use: Yes     Alcohol/week: 0.0 standard drinks     Comment: occasional     Drug use: No    Sexual activity: Not on file   Other Topics Concern    Not on file   Social History Narrative    Not on file     Social Determinants of Health     Financial Resource Strain:     Difficulty of Paying Living Expenses:    Food Insecurity:     Worried About Running Out of Food in the Last Year:     [address] in the Last Year:    Transportation Needs:     Lack of Transportation (Medical):      Lack of Transportation (Non-Medical):    Physical Activity:     Days of Exercise per Week:     Minutes of Exercise per Session:    Stress:     Feeling of Stress :    Social Connections:     Frequency of Communication with Friends and Family:     Frequency of Social Gatherings with Friends and Family:     Attends Rastafarian Services:     Active Member of Clubs or Organizations:     Attends Club or Organization Meetings:     Marital Status:    Intimate Partner Violence:     Fear of Current or Ex-Partner:     Emotionally Abused:     Physically Abused:     Sexually Abused:        Review of Systems:    Eyes:  No blurring, diplopia or vision loss. Respiratory:  No cough, pleuritic chest pain, dyspnea, or wheezing. Cardiovascular: No angina, palpitations . Hypertension, hyperlipidemia  Musculoskeletal:  No arthritis or weakness. Neurologic:  No paralysis, paresis, paresthesia, seizures or headache. Endocrinology: Diabetes mellitus          Physical Exam:  General appearance:  Alert, awake, oriented x 3. No distress. Eyes:  Conjunctivae appear normal; PERRL  Neck:  No jugular venous distention, lymphadenopathy or thyromegaly. Carotid bruit noted  Lungs:  Clear to ausculation bilaterally. No rhonchi, crackles, wheezes  Heart:  Regular rate and rhythm. No rub or murmur  Abdomen:  Soft, non-tender. No masses, organomegaly. Musculoskeletal : No joint effusions, tenderness swelling    Neuro: Speech is intact. Moving all extremities. No focal motor or sensory deficits      Extremities:  Both feet are warm to touch. The color of both feet is normal.    Multiple spider veins noted    Resolving superficial thrombophlebitis noted of the medial aspect of the right thigh    Pulses Right  Left    Brachial 3 3    Radial    3=normal   Femoral 2 2  2=diminished   Popliteal    1=barely palpable   Dorsalis pedis 2 2  0=absent   Posterior tibial    4=aneurysmal             Other pertinent information:1. The past medical records were reviewed. 2. The last ankle-brachial index, normal    3. The venous ultrasound study, no DVT      Assessment:    1. Varicose veins of leg with pain, right    2. Superficial phlebitis of leg, right    3. Pain in both lower legs    4.  Bilateral carotid artery stenosis              Plan:       Discussed the patient, was informed of the results of the ankle-brachial index, venous ultrasound study, informed her, the varicose vein, with superficial thrombophlebitis of the medial asp right calf, will slowly improve over the next several weeks and months but call me as needed if any increasing symptoms    Patient recommended, carotid ultrasound to monitor the carotid artery disease and call me if any symptoms, explained at the patient is satisfactory artery circulation, patient was recommended to discontinue cilostazol      Patient was instructed to continue walking program and to call if any worsening of symptoms and to call if any focal lateralizing neurological symptoms like loss of speech, vision or loss of function of extremity. All the questions were answered. Orders Placed This Encounter   Procedures    US CAROTID ARTERY BILATERAL             Indicated follow-up: Return in about 6 months (around 4/7/2022), or if symptoms worsen or fail to improve.

## 2021-10-08 ENCOUNTER — TELEPHONE (OUTPATIENT)
Dept: VASCULAR SURGERY | Age: 66
End: 2021-10-08

## 2021-10-08 NOTE — TELEPHONE ENCOUNTER
Patient was notified to discontinue Pletal but to continue taking ASA 81 mg daily.          ----- Message from Francoise Carter MD sent at 10/7/2021  1:30 PM EDT -----  Patient is satisfactory arterial circulation      I forgot to tell her, please inform the patient to discontinue Pletal     Please call the patient tell her to discontinue the Pletal but continue the low-dose aspirin

## 2021-10-26 ENCOUNTER — HOSPITAL ENCOUNTER (OUTPATIENT)
Dept: ULTRASOUND IMAGING | Age: 66
Discharge: HOME | End: 2021-10-28
Payer: MEDICARE

## 2021-10-26 DIAGNOSIS — I65.23 BILATERAL CAROTID ARTERY STENOSIS: ICD-10-CM

## 2021-10-26 PROCEDURE — 93880 EXTRACRANIAL BILAT STUDY: CPT

## 2021-10-26 PROCEDURE — 93880 EXTRACRANIAL BILAT STUDY: CPT | Performed by: RADIOLOGY

## 2021-10-28 ENCOUNTER — TELEPHONE (OUTPATIENT)
Dept: VASCULAR SURGERY | Age: 66
End: 2021-10-28

## 2021-10-29 ENCOUNTER — TELEPHONE (OUTPATIENT)
Dept: VASCULAR SURGERY | Age: 66
End: 2021-10-29

## 2021-10-29 NOTE — TELEPHONE ENCOUNTER
Message left for the patient regarding the carotid ultrasound, based upon my personal interpretation, approximately 40% stenosis in the right and 60% stenosis on the left, call the office if any questions, keep the next appointment

## 2021-10-30 ENCOUNTER — TELEPHONE (OUTPATIENT)
Dept: VASCULAR SURGERY | Age: 66
End: 2021-10-30

## 2021-10-30 NOTE — TELEPHONE ENCOUNTER
Discussed today with the patient, regarding the carotid ultrasound, 40% stenosis in the right and 60% stenosis left, no significant change, keep the next appointment, all her questions were answered

## 2021-12-10 ENCOUNTER — HOSPITAL ENCOUNTER (EMERGENCY)
Age: 66
Discharge: LEFT BEFORE DISCHARGE | End: 2021-12-10
Payer: MEDICARE

## 2021-12-10 VITALS
OXYGEN SATURATION: 97 % | HEIGHT: 65 IN | WEIGHT: 148 LBS | BODY MASS INDEX: 24.66 KG/M2 | HEART RATE: 95 BPM | TEMPERATURE: 97.4 F | DIASTOLIC BLOOD PRESSURE: 72 MMHG | SYSTOLIC BLOOD PRESSURE: 157 MMHG | RESPIRATION RATE: 16 BRPM

## 2021-12-10 LAB — METER GLUCOSE: 364 MG/DL (ref 74–99)

## 2021-12-10 PROCEDURE — 82962 GLUCOSE BLOOD TEST: CPT

## 2021-12-10 PROCEDURE — 4500000002 HC ER NO CHARGE

## 2021-12-10 NOTE — ED NOTES
Patient presented to the pivot desk and stated she was not waiting and walked out the doors.       Evalina Areas, RN  12/10/21 [id number]

## 2022-01-12 ENCOUNTER — APPOINTMENT (OUTPATIENT)
Dept: CT IMAGING | Age: 67
End: 2022-01-12
Payer: MEDICARE

## 2022-01-12 ENCOUNTER — HOSPITAL ENCOUNTER (EMERGENCY)
Age: 67
Discharge: HOME | End: 2022-01-12
Attending: STUDENT IN AN ORGANIZED HEALTH CARE EDUCATION/TRAINING PROGRAM
Payer: MEDICARE

## 2022-01-12 VITALS
HEIGHT: 65 IN | WEIGHT: 148 LBS | OXYGEN SATURATION: 99 % | TEMPERATURE: 98.1 F | HEART RATE: 82 BPM | DIASTOLIC BLOOD PRESSURE: 90 MMHG | BODY MASS INDEX: 24.66 KG/M2 | RESPIRATION RATE: 16 BRPM | SYSTOLIC BLOOD PRESSURE: 186 MMHG

## 2022-01-12 DIAGNOSIS — R20.0 NUMBNESS AND TINGLING: Primary | ICD-10-CM

## 2022-01-12 DIAGNOSIS — R20.2 NUMBNESS AND TINGLING: Primary | ICD-10-CM

## 2022-01-12 LAB
ANION GAP SERPL CALCULATED.3IONS-SCNC: 15 MMOL/L (ref 7–16)
BASOPHILS ABSOLUTE: 0.06 E9/L (ref 0–0.2)
BASOPHILS RELATIVE PERCENT: 0.8 % (ref 0–2)
BUN BLDV-MCNC: 32 MG/DL (ref 6–23)
CALCIUM SERPL-MCNC: 9.5 MG/DL (ref 8.6–10.2)
CHLORIDE BLD-SCNC: 105 MMOL/L (ref 98–107)
CO2: 21 MMOL/L (ref 22–29)
CREAT SERPL-MCNC: 1.3 MG/DL (ref 0.5–1)
EOSINOPHILS ABSOLUTE: 0.21 E9/L (ref 0.05–0.5)
EOSINOPHILS RELATIVE PERCENT: 2.8 % (ref 0–6)
GFR AFRICAN AMERICAN: 50
GFR NON-AFRICAN AMERICAN: 41 ML/MIN/1.73
GLUCOSE BLD-MCNC: 93 MG/DL (ref 74–99)
HCT VFR BLD CALC: 35.8 % (ref 34–48)
HEMOGLOBIN: 11.6 G/DL (ref 11.5–15.5)
IMMATURE GRANULOCYTES #: 0.02 E9/L
IMMATURE GRANULOCYTES %: 0.3 % (ref 0–5)
LYMPHOCYTES ABSOLUTE: 1.12 E9/L (ref 1.5–4)
LYMPHOCYTES RELATIVE PERCENT: 14.7 % (ref 20–42)
MAGNESIUM: 2.2 MG/DL (ref 1.6–2.6)
MCH RBC QN AUTO: 29.8 PG (ref 26–35)
MCHC RBC AUTO-ENTMCNC: 32.4 % (ref 32–34.5)
MCV RBC AUTO: 92 FL (ref 80–99.9)
MONOCYTES ABSOLUTE: 0.53 E9/L (ref 0.1–0.95)
MONOCYTES RELATIVE PERCENT: 7 % (ref 2–12)
NEUTROPHILS ABSOLUTE: 5.68 E9/L (ref 1.8–7.3)
NEUTROPHILS RELATIVE PERCENT: 74.4 % (ref 43–80)
PDW BLD-RTO: 13.6 FL (ref 11.5–15)
PLATELET # BLD: 344 E9/L (ref 130–450)
PMV BLD AUTO: 10.2 FL (ref 7–12)
POTASSIUM SERPL-SCNC: 4.8 MMOL/L (ref 3.5–5)
RBC # BLD: 3.89 E12/L (ref 3.5–5.5)
SODIUM BLD-SCNC: 141 MMOL/L (ref 132–146)
WBC # BLD: 7.6 E9/L (ref 4.5–11.5)

## 2022-01-12 PROCEDURE — 80048 BASIC METABOLIC PNL TOTAL CA: CPT

## 2022-01-12 PROCEDURE — 99283 EMERGENCY DEPT VISIT LOW MDM: CPT

## 2022-01-12 PROCEDURE — 36415 COLL VENOUS BLD VENIPUNCTURE: CPT

## 2022-01-12 PROCEDURE — 6370000000 HC RX 637 (ALT 250 FOR IP): Performed by: STUDENT IN AN ORGANIZED HEALTH CARE EDUCATION/TRAINING PROGRAM

## 2022-01-12 PROCEDURE — 93005 ELECTROCARDIOGRAM TRACING: CPT | Performed by: STUDENT IN AN ORGANIZED HEALTH CARE EDUCATION/TRAINING PROGRAM

## 2022-01-12 PROCEDURE — 83735 ASSAY OF MAGNESIUM: CPT

## 2022-01-12 PROCEDURE — 85025 COMPLETE CBC W/AUTO DIFF WBC: CPT

## 2022-01-12 PROCEDURE — 70450 CT HEAD/BRAIN W/O DYE: CPT

## 2022-01-12 RX ORDER — ACETAMINOPHEN 500 MG
1000 TABLET ORAL ONCE
Status: COMPLETED | OUTPATIENT
Start: 2022-01-12 | End: 2022-01-12

## 2022-01-12 RX ORDER — GABAPENTIN 100 MG/1
300 CAPSULE ORAL 2 TIMES DAILY
Qty: 180 CAPSULE | Refills: 5 | Status: SHIPPED | OUTPATIENT
Start: 2022-01-12 | End: 2022-11-03

## 2022-01-12 RX ORDER — IBUPROFEN 600 MG/1
600 TABLET ORAL ONCE
Status: COMPLETED | OUTPATIENT
Start: 2022-01-12 | End: 2022-01-12

## 2022-01-12 RX ADMIN — ACETAMINOPHEN 1000 MG: 500 TABLET ORAL at 18:48

## 2022-01-12 RX ADMIN — IBUPROFEN 600 MG: 600 TABLET, FILM COATED ORAL at 18:48

## 2022-01-12 ASSESSMENT — PAIN DESCRIPTION - FREQUENCY
FREQUENCY: INTERMITTENT
FREQUENCY: CONTINUOUS

## 2022-01-12 ASSESSMENT — PAIN DESCRIPTION - ONSET
ONSET: PROGRESSIVE
ONSET: ON-GOING

## 2022-01-12 ASSESSMENT — PAIN DESCRIPTION - PAIN TYPE
TYPE: ACUTE PAIN
TYPE: ACUTE PAIN

## 2022-01-12 ASSESSMENT — PAIN SCALES - GENERAL
PAINLEVEL_OUTOF10: 8
PAINLEVEL_OUTOF10: 4

## 2022-01-12 ASSESSMENT — PAIN DESCRIPTION - LOCATION
LOCATION: HEAD
LOCATION: FACE

## 2022-01-12 ASSESSMENT — PAIN DESCRIPTION - DESCRIPTORS
DESCRIPTORS: ACHING;HEADACHE
DESCRIPTORS: TINGLING

## 2022-01-12 ASSESSMENT — PAIN DESCRIPTION - PROGRESSION: CLINICAL_PROGRESSION: NOT CHANGED

## 2022-01-12 NOTE — ED PROVIDER NOTES
Surgical History:  has a past surgical history that includes Cholecystectomy; Appendectomy; Knee Arthroplasty (Right, 2011); and fracture surgery (Right). Social History:  reports that she has been smoking. She has never used smokeless tobacco. She reports current alcohol use. She reports that she does not use drugs. Family History: family history includes Arthritis in her father; Diabetes in her father and mother. . Unless otherwise noted, family history is non contributory    The patients home medications have been reviewed. Allergies: Synvisc [muriel g-jessy 20]    I have reviewed the past medical history, past surgical history, social history, and family history    ---------------------------------------------------PHYSICAL EXAM--------------------------------------    Constitutional: Appears in no distress  Head: Normocephalic, atraumatic  Eyes: Non-icteric slcera, no conjunctival injection  ENT: Moist mucous membranes,  Neck: Trachea midline, no JVD  Respiratory: Nonlabored respirations. Lungs clear to auscultation bilaterally, no wheezes, rales, or rhonchi. Cardiovascular: Regular rate. Regular rhythm. No murmurs, no gallops, no rubs. Gastrointestinal: Abdomen Soft, Non tender, Non distended. No rebound tenderness, guarding, or rigidity. Extremities: No lower extremity edema  Genitourinary: No CVA tenderness, no suprapubic tenderness  Musculoskeletal: Moves all extremities, no deformity  Skin: Pink, warm, dry without rash. Neurologic: Pupils are equal and reactive to light. Extraocular eye movements intact. Subjective numbness in the bilateral face as well as bilateral arms and legs. She does have 4 mm two-point discrimination as well as sharp and dull differentiation intact bilaterally symmetric facies. Tongue protrudes midline. No meningismus. 5 out of 5 symmetric strength of the upper and lower extremities. Finger to nose testing is within normal limits. The patient has a normal gait. Alert and oriented. -------------------------------------------------- RESULTS -------------------------------------------------  I have personally reviewed all laboratory and imaging results for this patient. Results are listed below. LABS: (Lab results interpreted by me)  Results for orders placed or performed during the hospital encounter of 80/02/98   Basic Metabolic Panel   Result Value Ref Range    Sodium 141 132 - 146 mmol/L    Potassium 4.8 3.5 - 5.0 mmol/L    Chloride 105 98 - 107 mmol/L    CO2 21 (L) 22 - 29 mmol/L    Anion Gap 15 7 - 16 mmol/L    Glucose 93 74 - 99 mg/dL    BUN 32 (H) 6 - 23 mg/dL    CREATININE 1.3 (H) 0.5 - 1.0 mg/dL    GFR Non-African American 41 >=60 mL/min/1.73    GFR African American 50     Calcium 9.5 8.6 - 10.2 mg/dL   CBC Auto Differential   Result Value Ref Range    WBC 7.6 4.5 - 11.5 E9/L    RBC 3.89 3.50 - 5.50 E12/L    Hemoglobin 11.6 11.5 - 15.5 g/dL    Hematocrit 35.8 34.0 - 48.0 %    MCV 92.0 80.0 - 99.9 fL    MCH 29.8 26.0 - 35.0 pg    MCHC 32.4 32.0 - 34.5 %    RDW 13.6 11.5 - 15.0 fL    Platelets 697 410 - 545 E9/L    MPV 10.2 7.0 - 12.0 fL    Neutrophils % 74.4 43.0 - 80.0 %    Immature Granulocytes % 0.3 0.0 - 5.0 %    Lymphocytes % 14.7 (L) 20.0 - 42.0 %    Monocytes % 7.0 2.0 - 12.0 %    Eosinophils % 2.8 0.0 - 6.0 %    Basophils % 0.8 0.0 - 2.0 %    Neutrophils Absolute 5.68 1.80 - 7.30 E9/L    Immature Granulocytes # 0.02 E9/L    Lymphocytes Absolute 1.12 (L) 1.50 - 4.00 E9/L    Monocytes Absolute 0.53 0.10 - 0.95 E9/L    Eosinophils Absolute 0.21 0.05 - 0.50 E9/L    Basophils Absolute 0.06 0.00 - 0.20 E9/L   Magnesium   Result Value Ref Range    Magnesium 2.2 1.6 - 2.6 mg/dL   ,       RADIOLOGY:  Interpreted by Radiologist unless otherwise specified  CT HEAD WO CONTRAST   Final Result   No acute intracranial abnormality.       RECOMMENDATIONS:   Unavailable               ------------------------- NURSING NOTES AND VITALS REVIEWED ---------------------------   The nursing notes within the ED encounter and vital signs as below have been reviewed by myself  BP (!) 186/90   Pulse 82   Temp 98.1 °F (36.7 °C) (Oral)   Resp 16   Ht 5' 5\" (1.651 m)   Wt 148 lb (67.1 kg)   SpO2 99%   BMI 24.63 kg/m²     Oxygen Saturation Interpretation: Normal    The patients available past medical records and past encounters were reviewed. ------------------------------ ED COURSE/MEDICAL DECISION MAKING----------------------  Medications   acetaminophen (TYLENOL) tablet 1,000 mg (1,000 mg Oral Given 1/12/22 1848)   ibuprofen (ADVIL;MOTRIN) tablet 600 mg (600 mg Oral Given 1/12/22 1848)        Re-Evaluations: This patient's ED course included:a personal history and physicial examination and re-evaluation prior to disposition    This patient has remained hemodynamically stable during their ED course. Consultations:  None    Medical Decision Making:   Patient presents emergency department with whole body numbness that seems to come and go. She has no clinical consistent neurologic exam that would be consistent with a particular nerve distribution. We will obtain a CT head. However, her symptoms are not consistent with any acute emergent concerning pathology at this time. She has no evidence of dental infection. She also has no evidence of any pathology on her face despite her feeling of fullness. Labs and imaging reviewed unremarkable for any acute emergent abnormality. I had a long discussion with the patient at bedside about the cause of numbness. Apparently, she states that she was previously on gabapentin for diabetic neuropathy that she has in all 4 extremities. Counseling: The emergency provider has spoken with the patient and discussed todays results, in addition to providing specific details for the plan of care and counseling regarding the diagnosis and prognosis.   Questions are answered at this time and they are agreeable with the plan.       --------------------------------- IMPRESSION AND DISPOSITION ---------------------------------    IMPRESSION  1. Numbness and tingling        DISPOSITION  Disposition: Discharge to home  Patient condition is stable    IDr. Leslie, am the primary provider of record    Leslie Bates DO  Emergency Medicine    NOTE: This report was transcribed using voice recognition software.  Every effort was made to ensure accuracy; however, inadvertent computerized transcription errors may be present         Yoel Mcintyre DO  01/12/22 2003

## 2022-01-13 LAB
EKG ATRIAL RATE: 89 BPM
EKG P AXIS: 72 DEGREES
EKG P-R INTERVAL: 168 MS
EKG Q-T INTERVAL: 360 MS
EKG QRS DURATION: 76 MS
EKG QTC CALCULATION (BAZETT): 438 MS
EKG R AXIS: 10 DEGREES
EKG T AXIS: 71 DEGREES
EKG VENTRICULAR RATE: 89 BPM

## 2022-01-13 PROCEDURE — 93010 ELECTROCARDIOGRAM REPORT: CPT | Performed by: INTERNAL MEDICINE

## 2022-01-13 NOTE — ED NOTES
Pt given discharge instructions and aware to  prescription from assigned pharmacy.   Pt informed to follow up with her primary care physician and make sure she attend her scheduled cardio appt for her hypertension     Be Yoo RN  01/12/22 2032

## 2022-03-14 ENCOUNTER — TELEPHONE (OUTPATIENT)
Dept: VASCULAR SURGERY | Age: 67
End: 2022-03-14

## 2022-03-14 NOTE — TELEPHONE ENCOUNTER
Spoke to pt on her carotid testing due prior to her appt on 4- with Dr Katy Bansal. Pt will to go [facility] and we need to set up testing.

## 2022-04-07 DIAGNOSIS — I65.23 BILATERAL CAROTID ARTERY STENOSIS: Primary | ICD-10-CM

## 2022-04-13 ENCOUNTER — TELEPHONE (OUTPATIENT)
Dept: VASCULAR SURGERY | Age: 67
End: 2022-04-13

## 2022-04-13 NOTE — TELEPHONE ENCOUNTER
Notified patient that carotid ultrasound was not authorized through [address] [facility] (they recommend 1 year follow up). She will keep her appt with Dr. Raisa Macario on 4-28-22.

## 2022-04-14 ENCOUNTER — TELEPHONE (OUTPATIENT)
Dept: VASCULAR SURGERY | Age: 67
End: 2022-04-14

## 2022-04-14 NOTE — TELEPHONE ENCOUNTER
Message left on pt's voicemail that Axel Cruz has authorized a carotid u/s, rescheduled in office for 4/28 at 1:30 pm.

## 2022-04-27 ENCOUNTER — TELEPHONE (OUTPATIENT)
Dept: VASCULAR SURGERY | Age: 67
End: 2022-04-27

## 2022-04-28 ENCOUNTER — HOSPITAL ENCOUNTER (OUTPATIENT)
Dept: CARDIOLOGY | Age: 67
Discharge: HOME | End: 2022-04-28
Payer: MEDICARE

## 2022-04-28 ENCOUNTER — OFFICE VISIT (OUTPATIENT)
Dept: VASCULAR SURGERY | Age: 67
End: 2022-04-28
Payer: MEDICARE

## 2022-04-28 DIAGNOSIS — I65.23 BILATERAL CAROTID ARTERY STENOSIS: Primary | ICD-10-CM

## 2022-04-28 DIAGNOSIS — R09.89 BRUIT OF LEFT CAROTID ARTERY: ICD-10-CM

## 2022-04-28 DIAGNOSIS — I65.23 BILATERAL CAROTID ARTERY STENOSIS: ICD-10-CM

## 2022-04-28 PROBLEM — I65.22 LEFT CAROTID STENOSIS: Status: RESOLVED | Noted: 2020-07-09 | Resolved: 2022-04-28

## 2022-04-28 PROBLEM — M79.661 PAIN IN BOTH LOWER LEGS: Status: RESOLVED | Noted: 2021-08-12 | Resolved: 2022-04-28

## 2022-04-28 PROBLEM — M79.662 PAIN IN BOTH LOWER LEGS: Status: RESOLVED | Noted: 2021-08-12 | Resolved: 2022-04-28

## 2022-04-28 PROBLEM — I80.01 SUPERFICIAL PHLEBITIS OF LEG, RIGHT: Status: RESOLVED | Noted: 2021-08-12 | Resolved: 2022-04-28

## 2022-04-28 PROCEDURE — 99213 OFFICE O/P EST LOW 20 MIN: CPT | Performed by: SURGERY

## 2022-04-28 PROCEDURE — 93880 EXTRACRANIAL BILAT STUDY: CPT

## 2022-04-28 NOTE — PROGRESS NOTES
Chief Complaint:   Chief Complaint   Patient presents with    Circulatory Problem     Follow up carotid stenosis. HPI: Patient came to the office for evaluation of her bilateral carotid stenosis, overall doing well, considering the fact that she is going through a difficult divorce procedure according to the patient      Patient denies any focal lateralizing neurological symptoms like loss of speech, vision or loss of function of extremity    Patient can walk a few blocks , and denies any symptoms of rest pain    Allergies   Allergen Reactions    Synvisc [Hylan G-F 20]      Local swelling       Current Outpatient Medications   Medication Sig Dispense Refill    gabapentin (NEURONTIN) 100 MG capsule Take 3 capsules by mouth 2 times daily for 180 days. Intended supply: 90 days 180 capsule 5    magnesium oxide (MAG-OX) 400 MG tablet Take 400 mg by mouth daily      methocarbamol (ROBAXIN) 500 MG tablet Take 500 mg by mouth 4 times daily      insulin 70-30 (NOVOLIN 70/30) (70-30) 100 UNIT per ML injection vial Inject into the skin 2 times daily      lisinopril (PRINIVIL;ZESTRIL) 10 MG tablet Take 20 mg by mouth daily       Pediatric Multivitamins-Fl (MULTI VIT/FL PO) Take  by mouth.  alprazolam (XANAX) 1 MG tablet   Take 1 mg by mouth 2 times daily Instructed to take am of procedure      furosemide (LASIX) 20 MG tablet Take 20 mg by mouth daily.  aspirin 81 MG EC tablet Take 81 mg by mouth daily. No current facility-administered medications for this visit.        Past Medical History:   Diagnosis Date    Anxiety     Bilateral carotid artery stenosis 10/7/2021    Bruit of left carotid artery 7/9/2020    Decreased dorsalis pedis pulse 7/9/2020    Humeral fracture     right    Hypertension     Kidney disease     Left carotid stenosis 7/9/2020    Leg pain     Osteoarthritis of right knee 5/4/2011    Superficial phlebitis of leg, right 8/12/2021    Type II or unspecified type diabetes mellitus without mention of complication, not stated as uncontrolled     Varicose veins of leg with pain, right 8/12/2021       Past Surgical History:   Procedure Laterality Date    APPENDECTOMY      CHOLECYSTECTOMY      FRACTURE SURGERY Right     arm    KNEE ARTHROPLASTY Right 2011    Right TKA. Adal Garcia MD       Family History   Problem Relation Age of Onset    Diabetes Mother     Arthritis Father     Diabetes Father        Social History     Socioeconomic History    Marital status:      Spouse name: Not on file    Number of children: Not on file    Years of education: Not on file    Highest education level: Not on file   Occupational History    Not on file   Tobacco Use    Smoking status: Current Some Day Smoker    Smokeless tobacco: Never Used   Vaping Use    Vaping Use: Never used   Substance and Sexual Activity    Alcohol use: Yes     Alcohol/week: 0.0 standard drinks     Comment: occasional     Drug use: No    Sexual activity: Not on file   Other Topics Concern    Not on file   Social History Narrative    Not on file     Social Determinants of Health     Financial Resource Strain:     Difficulty of Paying Living Expenses: Not on file   Food Insecurity:     Worried About Running Out of Food in the Last Year: Not on file    Tanya of Food in the Last Year: Not on file   Transportation Needs:     Lack of Transportation (Medical): Not on file    Lack of Transportation (Non-Medical):  Not on file   Physical Activity:     Days of Exercise per Week: Not on file    Minutes of Exercise per Session: Not on file   Stress:     Feeling of Stress : Not on file   Social Connections:     Frequency of Communication with Friends and Family: Not on file    Frequency of Social Gatherings with Friends and Family: Not on file    Attends Christianity Services: Not on file    Active Member of Clubs or Organizations: Not on file    Attends Club or Organization Meetings: Not on file    Marital Status: Not on file   Intimate Partner Violence:     Fear of Current or Ex-Partner: Not on file    Emotionally Abused: Not on file    Physically Abused: Not on file    Sexually Abused: Not on file   Housing Stability:     Unable to Pay for Housing in the Last Year: Not on file    Number of [place] in the Last Year: Not on file    Unstable Housing in the Last Year: Not on file       Review of Systems:    Eyes:  No blurring, diplopia or vision loss. Respiratory:  No cough, pleuritic chest pain, dyspnea, or wheezing. Cardiovascular: No angina, palpitations . Hypertension  Musculoskeletal:  No arthritis or weakness. Neurologic:  No paralysis, paresis, paresthesia, seizures or headache. Endocrinology: Diabetes mellitus          Physical Exam:  General appearance:  Alert, awake, oriented x 3. No distress. Eyes:  Conjunctivae appear normal; PERRL  Neck:  No jugular venous distention, lymphadenopathy or thyromegaly. Carotid bruit noted  Lungs:  Clear to ausculation bilaterally. No rhonchi, crackles, wheezes  Heart:  Regular rate and rhythm. No rub or murmur  Abdomen:  Soft, non-tender. No masses, organomegaly. Musculoskeletal : No joint effusions, tenderness swelling    Neuro: Speech is intact. Moving all extremities. No focal motor or sensory deficits      Extremities:  Both feet are warm to touch. The color of both feet is normal.        Pulses Right  Left    Brachial 3 3    Radial    3=normal   Femoral 2 2  2=diminished   Popliteal    1=barely palpable   Dorsalis pedis    0=absent   Posterior tibial 2 2  4=aneurysmal             Other pertinent information:1. The past medical records were reviewed. Assessment:    1. Bilateral carotid artery stenosis    2.  Bruit of left carotid artery              Plan:       Discussed with the patient regarding the results of the carotid ultrasound, 50% stenosis on the right and 60 to 70% stenosis on the left, asymptomatic reassured follow up in 6 months but call in the interim, if any strokelike symptoms, explained              Patient was instructed to continue walking program and to call if any worsening of symptoms and to call if any focal lateralizing neurological symptoms like loss of speech, vision or loss of function of extremity. All the questions were answered. Indicated follow-up: Return in about 6 months (around 10/28/2022), or if symptoms worsen or fail to improve.

## 2022-04-28 NOTE — PROGRESS NOTES
[facility] - [facility]    This is a pre read worksheet - prior to official physician interpretation    Jhonatan Jimmy  1955  Date of study: 4/28/22    Indication for study:  Carotid artery stenosis  Study : Bilateral Carotid Artery Duplex Examination    Duplex examination of the RIGHT carotid artery system identifies atherosclerotic plaque. The peak systolic velocity in internal carotid artery was 193 centimeters / second. The maximum end diastolic velocity was 40 centimeters / second. The ICA/CCA ratio is 1.6. The right vertebral artery has antegrade flow. Duplex examination of the LEFT carotid artery system identifies atherosclerotic plaque. The peak systolic velocity in internal carotid artery was 318 centimeters / second. The maximum end diastolic velocity was 70 centimeters / second. The ICA/CCA ratio is 2.1. The left vertebral artery has antegrade flow.     RIGHT  50%  LEFT 60-70%    psv        LAST STUDY  10/26/2021 @ [facility]  Rt 0-49  Lt 50-69

## 2022-05-02 NOTE — PROCEDURES
[address]                  [address] [facility],  [facility]                                VASCULAR REPORT    PATIENT NAME: Mela Sheldon                       :        1955  MED REC NO:   [id number]                            ROOM:  ACCOUNT NO:   [de-identified]                           ADMIT DATE: 2022  PROVIDER:     Yung Bucio MD    DATE OF PROCEDURE:  2022    CAROTID ULTRASOUND    INDICATION:  Bilateral carotid stenosis. FINDINGS:  Duplex scanning of the right carotid artery revealed the  patient has moderate plaque with peak internal carotid velocity of 454,  diastolic velocity 40 cm/second with the plaque causing 50% stenosis. Duplex scanning of the left carotid artery revealed moderate plaque with  a peak internal carotid velocity of 038, diastolic velocity of 70  cm/second with plaque causing 60% to 70%    IMPRESSION:  50% stenosis of the right carotid artery associated with  60% to 70% of left carotid artery, overall no significant change from  last study.         Isadora Templeton MD    D: 2022 12:28:19       T: 2022 12:30:33     [email or web address]  Job#: [id number]     Doc#: [id number]    CC:

## 2022-06-04 ENCOUNTER — HOSPITAL ENCOUNTER (EMERGENCY)
Age: 67
Discharge: HOME | End: 2022-06-04
Attending: EMERGENCY MEDICINE
Payer: MEDICARE

## 2022-06-04 VITALS
WEIGHT: 160 LBS | DIASTOLIC BLOOD PRESSURE: 78 MMHG | RESPIRATION RATE: 16 BRPM | TEMPERATURE: 98 F | SYSTOLIC BLOOD PRESSURE: 160 MMHG | OXYGEN SATURATION: 99 % | BODY MASS INDEX: 26.63 KG/M2 | HEART RATE: 79 BPM

## 2022-06-04 DIAGNOSIS — T24.001A BURN OF RIGHT LOWER EXTREMITY, UNSPECIFIED BURN DEGREE, INITIAL ENCOUNTER: Primary | ICD-10-CM

## 2022-06-04 PROCEDURE — 6370000000 HC RX 637 (ALT 250 FOR IP): Performed by: EMERGENCY MEDICINE

## 2022-06-04 PROCEDURE — 90471 IMMUNIZATION ADMIN: CPT | Performed by: EMERGENCY MEDICINE

## 2022-06-04 PROCEDURE — 99284 EMERGENCY DEPT VISIT MOD MDM: CPT

## 2022-06-04 PROCEDURE — 6360000002 HC RX W HCPCS: Performed by: EMERGENCY MEDICINE

## 2022-06-04 PROCEDURE — 90714 TD VACC NO PRESV 7 YRS+ IM: CPT | Performed by: EMERGENCY MEDICINE

## 2022-06-04 RX ORDER — HYDROCODONE BITARTRATE AND ACETAMINOPHEN 5; 325 MG/1; MG/1
1 TABLET ORAL EVERY 6 HOURS PRN
Qty: 10 TABLET | Refills: 0 | Status: SHIPPED | OUTPATIENT
Start: 2022-06-04 | End: 2022-06-07

## 2022-06-04 RX ORDER — HYDROCODONE BITARTRATE AND ACETAMINOPHEN 5; 325 MG/1; MG/1
1 TABLET ORAL ONCE
Status: COMPLETED | OUTPATIENT
Start: 2022-06-04 | End: 2022-06-04

## 2022-06-04 RX ORDER — TETANUS AND DIPHTHERIA TOXOIDS ADSORBED 2; 2 [LF]/.5ML; [LF]/.5ML
0.5 INJECTION INTRAMUSCULAR ONCE
Status: COMPLETED | OUTPATIENT
Start: 2022-06-04 | End: 2022-06-04

## 2022-06-04 RX ADMIN — TETANUS AND DIPHTHERIA TOXOIDS ADSORBED 0.5 ML: 2; 2 INJECTION INTRAMUSCULAR at 14:57

## 2022-06-04 RX ADMIN — HYDROCODONE BITARTRATE AND ACETAMINOPHEN 1 TABLET: 5; 325 TABLET ORAL at 14:57

## 2022-06-04 ASSESSMENT — PAIN DESCRIPTION - ORIENTATION: ORIENTATION: RIGHT

## 2022-06-04 ASSESSMENT — PAIN SCALES - GENERAL
PAINLEVEL_OUTOF10: 4
PAINLEVEL_OUTOF10: 6

## 2022-06-04 ASSESSMENT — ENCOUNTER SYMPTOMS
ABDOMINAL PAIN: 0
SHORTNESS OF BREATH: 0
BACK PAIN: 0
COUGH: 0

## 2022-06-04 ASSESSMENT — PAIN - FUNCTIONAL ASSESSMENT
PAIN_FUNCTIONAL_ASSESSMENT: 0-10
PAIN_FUNCTIONAL_ASSESSMENT: 0-10
PAIN_FUNCTIONAL_ASSESSMENT: PREVENTS OR INTERFERES SOME ACTIVE ACTIVITIES AND ADLS

## 2022-06-04 ASSESSMENT — PAIN DESCRIPTION - LOCATION
LOCATION: FOOT
LOCATION: FOOT
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
## 2022-08-02 ENCOUNTER — HOSPITAL ENCOUNTER (OUTPATIENT)
Dept: MRI IMAGING | Age: 67
Discharge: HOME | End: 2022-08-04
Payer: MEDICARE

## 2022-08-02 DIAGNOSIS — G95.9 MYELOPATHY (HCC): ICD-10-CM

## 2022-08-02 PROCEDURE — 72146 MRI CHEST SPINE W/O DYE: CPT

## 2022-08-02 PROCEDURE — 72141 MRI NECK SPINE W/O DYE: CPT

## 2022-08-08 ENCOUNTER — HOSPITAL ENCOUNTER (OUTPATIENT)
Dept: NEUROLOGY | Age: 67
Discharge: HOME | End: 2022-08-08
Payer: MEDICARE

## 2022-08-08 VITALS — BODY MASS INDEX: 25.83 KG/M2 | WEIGHT: 155 LBS | HEIGHT: 65 IN

## 2022-08-08 DIAGNOSIS — R25.2 CRAMPS OF LEFT LOWER EXTREMITY: ICD-10-CM

## 2022-08-08 DIAGNOSIS — R29.898 BILATERAL LEG WEAKNESS: ICD-10-CM

## 2022-08-08 PROCEDURE — 95912 NRV CNDJ TEST 11-12 STUDIES: CPT

## 2022-08-08 PROCEDURE — 95886 MUSC TEST DONE W/N TEST COMP: CPT

## 2022-08-08 NOTE — PROCEDURES
[address] [facility]  [address], [address]  Phone: [phone]  Fax: [phone]      Referring Provider: Farrah Hussein  Primary Care Physician: Kerwin Carr MD  Patient Name: Antwan Sierra  Patient YOB: 1955  Gender: female  BMI: Body mass index is 25.79 kg/m². Height 5' 5\" (1.651 m), weight 155 lb (70.3 kg). 8/8/2022    Reason for referral: EMG    Description of clinical problem:   No chief complaint on file. Pain Yes   ; Numbness/tingling  Yes; Weakness  No       Brief physical exam:   Sensory deficit No; Weakness No; Atrophy  Yes; Reflex abnormality Yes      Study Limitations:  none    Summary of Findings:   Nerve conduction studies: The following nerve conduction studies were abnormal:   See interpretation and table  All other nerve conduction studies listed in the table above were normal in latency, amplitude and conduction velocity. [facility]  Electrodiagnostic Laboratory  [place]        Full Name: Russel Keenan Gender: Female  MRN: [id number] YOB: 1955  [facility] Visit Date: 8/8/2022 09:09  Age: 79 Years 0 Months Old  Examining Physician: Dr. Ema Redd  Referring Physician: Dr. Stacy Farrar  Technician: Angela Escamilla   Height: 5 feet 5 inch  Weight: 155 lbs  Notes: Bilat. leg weakness R29.898; Cramps LLE R25.2      Motor NCS      Nerve / Sites Lat. Amplitude Amp. 1-2 Distance Lat Diff Velocity Temp.    ms mV % cm ms m/s °C   L Peroneal - EDB      Ankle NR NR NR 8   31      Pop fossa NR NR NR  NR  31   R Peroneal - EDB      Ankle NR NR NR 8   31      Pop fossa NR NR NR  NR  31   R Tibial - AH      Ankle 5.78 0.2 100 8   31      Pop fossa 16.77 0.2 84 40 10.99 36 31   L Tibial - AH      Ankle 4.01 0.4 100 8   31.1      Pop fossa 15.26 0.2 55.8 40 11.25 36 31.1   L Peroneal - Tib Ant      Fib Head 3.23 3.8 100 10   31      Pop fossa 5.47 3.8 100 10 2.24 45 31   R Peroneal - Tib Ant      Fib Head 2.86 3.1 100 10   31      Pop fossa 5.16 2.6 84.3 10 2.29 44 31       Sensory NCS      Nerve / Sites Onset Lat Peak Lat PP Amp Distance Velocity Temp.    ms ms µV cm m/s °C   L Superficial peroneal - Ankle      Lat leg NR NR NR 10 NR 31.1   R Superficial peroneal - Ankle      Lat leg NR NR NR 10 NR 31   R Sural - Ankle (Calf)      Calf NR NR NR 14 NR 31   L Sural - Ankle (Calf)      Calf NR NR NR 14 NR 31.1             F  Wave      Nerve F Lat M Lat F-M Lat    ms ms ms   R Tibial - AH NR NR NR   L Tibial - AH NR NR NR       H Reflex      Nerve Lat Hmax    ms   R Tibial - Soleus NR   L Tibial - Soleus 36. 7       EMG         EMG Summary Table     Spontaneous MUAP Recruitment   Muscle IA Fib PSW Fasc H.F. Amp Dur. PPP Pattern   L. Extensor digitorum brevis N None None None None N N N N   L. Tibialis anterior N None None None None N N N N   L. Gastrocnemius (Lateral head) N None None None None N N N N   L. Gastrocnemius (Medial head) N None None None None N N N N   L. Vastus lateralis N None None None None N N N N                  Needle EMG:   Needle EMG was performed using a monopolar needle. The following abnormalities were seen on needle EMG: none  All other muscles tested, as listed in the table above demonstrated normal amplitude, duration, phases and recruitment and no active denervation signs were seen. 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 Clinical correlation advised. Technologist: Cleveland Pérez MD    Nerve conduction studies and electromyography were performed according to our laboratory policies and procedures which can be provided upon request. All abnormal values are identified in the table.  Laboratory normal values can also be provided upon request.       Cc: Claudette Batten, MD

## 2022-10-20 DIAGNOSIS — I65.23 BILATERAL CAROTID ARTERY STENOSIS: Primary | ICD-10-CM

## 2022-11-02 ENCOUNTER — TELEPHONE (OUTPATIENT)
Dept: VASCULAR SURGERY | Age: 67
End: 2022-11-02

## 2022-11-03 ENCOUNTER — OFFICE VISIT (OUTPATIENT)
Dept: VASCULAR SURGERY | Age: 67
End: 2022-11-03
Payer: MEDICARE

## 2022-11-03 ENCOUNTER — HOSPITAL ENCOUNTER (OUTPATIENT)
Dept: CARDIOLOGY | Age: 67
Discharge: HOME | End: 2022-11-03
Payer: MEDICARE

## 2022-11-03 DIAGNOSIS — I65.23 BILATERAL CAROTID ARTERY STENOSIS: Primary | ICD-10-CM

## 2022-11-03 DIAGNOSIS — R09.89 BRUIT OF LEFT CAROTID ARTERY: ICD-10-CM

## 2022-11-03 DIAGNOSIS — I65.23 BILATERAL CAROTID ARTERY STENOSIS: ICD-10-CM

## 2022-11-03 PROCEDURE — 1123F ACP DISCUSS/DSCN MKR DOCD: CPT | Performed by: SURGERY

## 2022-11-03 PROCEDURE — 99213 OFFICE O/P EST LOW 20 MIN: CPT | Performed by: SURGERY

## 2022-11-03 PROCEDURE — 93880 EXTRACRANIAL BILAT STUDY: CPT

## 2022-11-03 RX ORDER — SODIUM ZIRCONIUM CYCLOSILICATE 10 G/10G
10 POWDER, FOR SUSPENSION ORAL DAILY
COMMUNITY
Start: 2022-11-02

## 2022-11-03 RX ORDER — VERAPAMIL HYDROCHLORIDE 300 MG/1
300 CAPSULE, EXTENDED RELEASE ORAL DAILY
COMMUNITY
Start: 2022-09-29

## 2022-11-03 NOTE — PROGRESS NOTES
Chief Complaint:   Chief Complaint   Patient presents with    Circulatory Problem     Follow up carotid stenosis. HPI: Patient came to the office, for the evaluation of carotid artery disease, overall doing well with her  and divorce proceedings etc.      Patient denies any focal lateralizing neurological symptoms like loss of speech, vision or loss of function of extremity    Patient can walk a few blocks , and denies any symptoms of rest pain    Allergies   Allergen Reactions    Synvisc [Hylan G-F 20]      Local swelling       Current Outpatient Medications   Medication Sig Dispense Refill    LOKELMA 10 g PACK oral suspension Take 10 g by mouth daily      Verapamil 300 MG CP24 Take 300 mg by mouth daily      gabapentin (NEURONTIN) 100 MG capsule Take 3 capsules by mouth 2 times daily for 180 days. Intended supply: 90 days 180 capsule 5    magnesium oxide (MAG-OX) 400 MG tablet Take 400 mg by mouth daily      methocarbamol (ROBAXIN) 500 MG tablet Take 500 mg by mouth 4 times daily      insulin 70-30 (HUMULIN;NOVOLIN) (70-30) 100 UNIT per ML injection vial Inject into the skin 2 times daily      alprazolam (XANAX) 1 MG tablet   Take 1 mg by mouth 2 times daily Instructed to take am of procedure      furosemide (LASIX) 20 MG tablet Take 20 mg by mouth daily. aspirin 81 MG EC tablet Take 81 mg by mouth daily. Pediatric Multivitamins-Fl (MULTI VIT/FL PO) Take  by mouth. (Patient not taking: Reported on 11/3/2022)       No current facility-administered medications for this visit.        Past Medical History:   Diagnosis Date    Anxiety     Bilateral carotid artery stenosis 10/7/2021    Bruit of left carotid artery 7/9/2020    Decreased dorsalis pedis pulse 7/9/2020    Humeral fracture     right    Hypertension     Kidney disease     Left carotid stenosis 7/9/2020    Leg pain     Osteoarthritis of right knee 5/4/2011    Superficial phlebitis of leg, right 8/12/2021    Type II or unspecified type diabetes mellitus without mention of complication, not stated as uncontrolled     Varicose veins of leg with pain, right 8/12/2021       Past Surgical History:   Procedure Laterality Date    APPENDECTOMY      CHOLECYSTECTOMY      FRACTURE SURGERY Right     arm    KNEE ARTHROPLASTY Right 2011    Right TKA. Claiborne Shone Jayne Ramsay, MD       Family History   Problem Relation Age of Onset    Diabetes Mother     Arthritis Father     Diabetes Father        Social History     Socioeconomic History    Marital status:      Spouse name: Not on file    Number of children: Not on file    Years of education: Not on file    Highest education level: Not on file   Occupational History    Not on file   Tobacco Use    Smoking status: Some Days    Smokeless tobacco: Never   Vaping Use    Vaping Use: Never used   Substance and Sexual Activity    Alcohol use: Yes     Alcohol/week: 0.0 standard drinks     Comment: occasional     Drug use: No    Sexual activity: Not on file   Other Topics Concern    Not on file   Social History Narrative    Not on file     Social Determinants of Health     Financial Resource Strain: Not on file   Food Insecurity: Not on file   Transportation Needs: Not on file   Physical Activity: Not on file   Stress: Not on file   Social Connections: Not on file   Intimate Partner Violence: Not on file   Housing Stability: Not on file       Review of Systems:    Eyes:  No blurring, diplopia or vision loss. Respiratory:  No cough, pleuritic chest pain, dyspnea, or wheezing. Cardiovascular: No angina, palpitations . Hypertension  Musculoskeletal:  No arthritis or weakness. Neurologic:  No paralysis, paresis, paresthesia, seizures or headache. Endocrinology: Diabetes mellitus          Physical Exam:  General appearance:  Alert, awake, oriented x 3. No distress. Eyes:  Conjunctivae appear normal; PERRL  Neck:  No jugular venous distention, lymphadenopathy or thyromegaly.   Carotid bruit noted  Lungs:  Clear to ausculation bilaterally. No rhonchi, crackles, wheezes  Heart:  Regular rate and rhythm. No rub or murmur  Abdomen:  Soft, non-tender. No masses, organomegaly. Musculoskeletal : No joint effusions, tenderness swelling    Neuro: Speech is intact. Moving all extremities. No focal motor or sensory deficits      Extremities:  Both feet are warm to touch. The color of both feet is normal.        Pulses Right  Left    Brachial 3 3    Radial    3=normal   Femoral 2 2  2=diminished   Popliteal    1=barely palpable   Dorsalis pedis    0=absent   Posterior tibial 2 2  4=aneurysmal             Other pertinent information:1. The past medical records were reviewed. Assessment:    1. Bilateral carotid artery stenosis    2. Bruit of left carotid artery              Plan:       Discussed with the patient regarding the carotid ultrasound that was done today personally reviewed, stable disease, 50% stenosis on the right and 60 to 70% stenosis left, unchanged, asymptomatic reassured follow-up in 6-month but call and come to the hospital if any strokelike symptoms, explained              Patient was instructed to continue walking program and to call if any worsening of symptoms and to call if any focal lateralizing neurological symptoms like loss of speech, vision or loss of function of extremity. All the questions were answered. Indicated follow-up: Return in about 6 months (around 5/3/2023), or if symptoms worsen or fail to improve.

## 2022-11-03 NOTE — PROGRESS NOTES
[facility]    This is a pre read worksheet - prior to official physician interpretation    Vikramjoaquinsixto Tonia  1955  Date of study: 11/3/22    Indication for study:  Carotid artery stenosis  Study : Bilateral Carotid Artery Duplex Examination    Duplex examination of the RIGHT carotid artery system identifies atherosclerotic plaque. The peak systolic velocity in internal carotid artery was 189 centimeters / second. The maximum end diastolic velocity was 46 centimeters / second. The ICA/CCA ratio is 1.9. The right vertebral artery has antegrade flow. Duplex examination of the LEFT carotid artery system identifies atherosclerotic plaque. The peak systolic velocity in internal carotid artery was 290 centimeters / second. The maximum end diastolic velocity was 63 centimeters / second. The ICA/CCA ratio is 2.8. The left vertebral artery has antegrade flow.     RIGHT 50%  LEFT 60-70%    psv      LAST STUDY  4/28/2022  Rt 50  Lt 60-70

## 2023-01-04 ENCOUNTER — APPOINTMENT (OUTPATIENT)
Dept: GENERAL RADIOLOGY | Age: 68
DRG: 291 | End: 2023-01-04
Payer: MEDICARE

## 2023-01-04 ENCOUNTER — APPOINTMENT (OUTPATIENT)
Dept: CT IMAGING | Age: 68
DRG: 291 | End: 2023-01-04
Payer: MEDICARE

## 2023-01-04 ENCOUNTER — APPOINTMENT (OUTPATIENT)
Dept: ULTRASOUND IMAGING | Age: 68
DRG: 291 | End: 2023-01-04
Payer: MEDICARE

## 2023-01-04 ENCOUNTER — HOSPITAL ENCOUNTER (INPATIENT)
Age: 68
LOS: 2 days | Discharge: HOME | DRG: 291 | End: 2023-01-07
Attending: EMERGENCY MEDICINE | Admitting: FAMILY MEDICINE
Payer: MEDICARE

## 2023-01-04 DIAGNOSIS — I50.9 CONGESTIVE HEART FAILURE OF UNKNOWN ETIOLOGY (HCC): ICD-10-CM

## 2023-01-04 DIAGNOSIS — I50.9 ACUTE CONGESTIVE HEART FAILURE, UNSPECIFIED HEART FAILURE TYPE (HCC): Primary | ICD-10-CM

## 2023-01-04 DIAGNOSIS — I82.451 ACUTE DEEP VEIN THROMBOSIS (DVT) OF RIGHT PERONEAL VEIN (HCC): ICD-10-CM

## 2023-01-04 DIAGNOSIS — R60.9 3+ PITTING EDEMA: ICD-10-CM

## 2023-01-04 LAB
ALBUMIN SERPL-MCNC: 3.7 G/DL (ref 3.5–5.2)
ALP BLD-CCNC: 106 U/L (ref 35–104)
ALT SERPL-CCNC: 60 U/L (ref 0–32)
ANION GAP SERPL CALCULATED.3IONS-SCNC: 9 MMOL/L (ref 7–16)
AST SERPL-CCNC: 30 U/L (ref 0–31)
BACTERIA: ABNORMAL /HPF
BASOPHILS ABSOLUTE: 0.08 E9/L (ref 0–0.2)
BASOPHILS RELATIVE PERCENT: 1 % (ref 0–2)
BILIRUB SERPL-MCNC: 0.6 MG/DL (ref 0–1.2)
BILIRUBIN URINE: NEGATIVE
BLOOD, URINE: ABNORMAL
BUN BLDV-MCNC: 16 MG/DL (ref 6–23)
CALCIUM SERPL-MCNC: 9.2 MG/DL (ref 8.6–10.2)
CHLORIDE BLD-SCNC: 102 MMOL/L (ref 98–107)
CLARITY: CLEAR
CO2: 27 MMOL/L (ref 22–29)
COLOR: YELLOW
CREAT SERPL-MCNC: 1.1 MG/DL (ref 0.5–1)
EKG ATRIAL RATE: 81 BPM
EKG P AXIS: 67 DEGREES
EKG P-R INTERVAL: 184 MS
EKG Q-T INTERVAL: 388 MS
EKG QRS DURATION: 78 MS
EKG QTC CALCULATION (BAZETT): 450 MS
EKG R AXIS: 11 DEGREES
EKG T AXIS: 57 DEGREES
EKG VENTRICULAR RATE: 81 BPM
EOSINOPHILS ABSOLUTE: 1.18 E9/L (ref 0.05–0.5)
EOSINOPHILS RELATIVE PERCENT: 14.3 % (ref 0–6)
GFR SERPL CREATININE-BSD FRML MDRD: 55 ML/MIN/1.73
GLUCOSE BLD-MCNC: 293 MG/DL (ref 74–99)
GLUCOSE URINE: >=1000 MG/DL
HCT VFR BLD CALC: 31.3 % (ref 34–48)
HEMOGLOBIN: 10 G/DL (ref 11.5–15.5)
IMMATURE GRANULOCYTES #: 0.03 E9/L
IMMATURE GRANULOCYTES %: 0.4 % (ref 0–5)
KETONES, URINE: NEGATIVE MG/DL
LEUKOCYTE ESTERASE, URINE: NEGATIVE
LYMPHOCYTES ABSOLUTE: 1.53 E9/L (ref 1.5–4)
LYMPHOCYTES RELATIVE PERCENT: 18.5 % (ref 20–42)
MCH RBC QN AUTO: 29.2 PG (ref 26–35)
MCHC RBC AUTO-ENTMCNC: 31.9 % (ref 32–34.5)
MCV RBC AUTO: 91.3 FL (ref 80–99.9)
METER GLUCOSE: 171 MG/DL (ref 74–99)
METER GLUCOSE: 73 MG/DL (ref 74–99)
MONOCYTES ABSOLUTE: 0.41 E9/L (ref 0.1–0.95)
MONOCYTES RELATIVE PERCENT: 5 % (ref 2–12)
NEUTROPHILS ABSOLUTE: 5.02 E9/L (ref 1.8–7.3)
NEUTROPHILS RELATIVE PERCENT: 60.8 % (ref 43–80)
NITRITE, URINE: NEGATIVE
PDW BLD-RTO: 13.5 FL (ref 11.5–15)
PH UA: 6 (ref 5–9)
PLATELET # BLD: 304 E9/L (ref 130–450)
PMV BLD AUTO: 9.7 FL (ref 7–12)
POTASSIUM SERPL-SCNC: 4.1 MMOL/L (ref 3.5–5)
PRO-BNP: 1152 PG/ML (ref 0–125)
PROTEIN UA: 30 MG/DL
RBC # BLD: 3.43 E12/L (ref 3.5–5.5)
RBC UA: ABNORMAL /HPF (ref 0–2)
SODIUM BLD-SCNC: 138 MMOL/L (ref 132–146)
SPECIFIC GRAVITY UA: 1.01 (ref 1–1.03)
TOTAL PROTEIN: 6.3 G/DL (ref 6.4–8.3)
TROPONIN, HIGH SENSITIVITY: 24 NG/L (ref 0–9)
TROPONIN, HIGH SENSITIVITY: 25 NG/L (ref 0–9)
TROPONIN, HIGH SENSITIVITY: 29 NG/L (ref 0–9)
UROBILINOGEN, URINE: 0.2 E.U./DL
WBC # BLD: 8.3 E9/L (ref 4.5–11.5)
WBC UA: ABNORMAL /HPF (ref 0–5)

## 2023-01-04 PROCEDURE — 93010 ELECTROCARDIOGRAM REPORT: CPT | Performed by: INTERNAL MEDICINE

## 2023-01-04 PROCEDURE — 93005 ELECTROCARDIOGRAM TRACING: CPT | Performed by: PHYSICIAN ASSISTANT

## 2023-01-04 PROCEDURE — 81001 URINALYSIS AUTO W/SCOPE: CPT

## 2023-01-04 PROCEDURE — 96374 THER/PROPH/DIAG INJ IV PUSH: CPT

## 2023-01-04 PROCEDURE — 6360000002 HC RX W HCPCS: Performed by: PHYSICIAN ASSISTANT

## 2023-01-04 PROCEDURE — 71275 CT ANGIOGRAPHY CHEST: CPT

## 2023-01-04 PROCEDURE — 85025 COMPLETE CBC W/AUTO DIFF WBC: CPT

## 2023-01-04 PROCEDURE — 84484 ASSAY OF TROPONIN QUANT: CPT

## 2023-01-04 PROCEDURE — 99285 EMERGENCY DEPT VISIT HI MDM: CPT

## 2023-01-04 PROCEDURE — 83880 ASSAY OF NATRIURETIC PEPTIDE: CPT

## 2023-01-04 PROCEDURE — 80053 COMPREHEN METABOLIC PANEL: CPT

## 2023-01-04 PROCEDURE — 71046 X-RAY EXAM CHEST 2 VIEWS: CPT

## 2023-01-04 PROCEDURE — 93970 EXTREMITY STUDY: CPT

## 2023-01-04 PROCEDURE — 36415 COLL VENOUS BLD VENIPUNCTURE: CPT

## 2023-01-04 PROCEDURE — 96375 TX/PRO/DX INJ NEW DRUG ADDON: CPT

## 2023-01-04 PROCEDURE — 96372 THER/PROPH/DIAG INJ SC/IM: CPT

## 2023-01-04 PROCEDURE — 6360000004 HC RX CONTRAST MEDICATION: Performed by: RADIOLOGY

## 2023-01-04 PROCEDURE — 82962 GLUCOSE BLOOD TEST: CPT

## 2023-01-04 RX ORDER — FUROSEMIDE 10 MG/ML
20 INJECTION INTRAMUSCULAR; INTRAVENOUS ONCE
Status: COMPLETED | OUTPATIENT
Start: 2023-01-04 | End: 2023-01-04

## 2023-01-04 RX ORDER — ENOXAPARIN SODIUM 100 MG/ML
1 INJECTION SUBCUTANEOUS ONCE
Status: COMPLETED | OUTPATIENT
Start: 2023-01-04 | End: 2023-01-04

## 2023-01-04 RX ADMIN — ENOXAPARIN SODIUM 70 MG: 100 INJECTION SUBCUTANEOUS at 21:15

## 2023-01-04 RX ADMIN — FUROSEMIDE 20 MG: 10 INJECTION, SOLUTION INTRAMUSCULAR; INTRAVENOUS at 21:13

## 2023-01-04 RX ADMIN — IOPAMIDOL 75 ML: 755 INJECTION, SOLUTION INTRAVENOUS at 18:57

## 2023-01-04 ASSESSMENT — PAIN SCALES - GENERAL: PAINLEVEL_OUTOF10: 3

## 2023-01-04 ASSESSMENT — PAIN DESCRIPTION - LOCATION: LOCATION: BACK;KNEE

## 2023-01-04 ASSESSMENT — PAIN DESCRIPTION - DESCRIPTORS: DESCRIPTORS: ACHING

## 2023-01-04 ASSESSMENT — PAIN - FUNCTIONAL ASSESSMENT: PAIN_FUNCTIONAL_ASSESSMENT: 0-10

## 2023-01-04 NOTE — ED PROVIDER NOTES
Shared [facility] Attending Visit. CC: No    Seen with Luis Liu CNP    HPI:  1/4/23,   Time: 2:44 PM EDILIA Atkins is a 79 y.o. female with a pmhx of kidney disease, HTN and T2DM presenting to the ED for bilateral lower extremity and hand swelling for 5 days. She has never had this happen before. Over the course of the 5 days she does state the swelling has worsened. She describes a pain in her legs that feels like pressure and tightness. She also admits to increased urination. She is on Lasix at home. She called her nephrologist and PCP this morning but they both advised her to come to the ED. She denies any current fever, chills, body aches, HA, blurry vision, cough, SOB, chest pain, abdominal pain, flank pain or numbness/tingling in the extremities. ROS:   Pertinent positives and negatives are stated within HPI, all other systems reviewed and are negative.  --------------------------------------------- PAST HISTORY ---------------------------------------------  Past Medical History:  has a past medical history of Anxiety, Bilateral carotid artery stenosis, Bruit of left carotid artery, Decreased dorsalis pedis pulse, Humeral fracture, Hypertension, Kidney disease, Left carotid stenosis, Leg pain, Osteoarthritis of right knee, Superficial phlebitis of leg, right, Type II or unspecified type diabetes mellitus without mention of complication, not stated as uncontrolled, and Varicose veins of leg with pain, right. Past Surgical History:  has a past surgical history that includes Cholecystectomy; Appendectomy; Knee Arthroplasty (Right, 2011); and fracture surgery (Right). Social History:  reports that she has been smoking. She has never used smokeless tobacco. She reports current alcohol use. She reports that she does not use drugs. Family History: family history includes Arthritis in her father; Diabetes in her father and mother.      The patients home medications have been reviewed.     Allergies: Synvisc [muriel vaughn 20]    -------------------------------------------------- RESULTS -------------------------------------------------  All laboratory and radiology results have been personally reviewed by myself   LABS:  Results for orders placed or performed during the hospital encounter of 01/04/23   CBC with Auto Differential   Result Value Ref Range    WBC 8.3 4.5 - 11.5 E9/L    RBC 3.43 (L) 3.50 - 5.50 E12/L    Hemoglobin 10.0 (L) 11.5 - 15.5 g/dL    Hematocrit 31.3 (L) 34.0 - 48.0 %    MCV 91.3 80.0 - 99.9 fL    MCH 29.2 26.0 - 35.0 pg    MCHC 31.9 (L) 32.0 - 34.5 %    RDW 13.5 11.5 - 15.0 fL    Platelets 169 607 - 635 E9/L    MPV 9.7 7.0 - 12.0 fL    Neutrophils % 60.8 43.0 - 80.0 %    Immature Granulocytes % 0.4 0.0 - 5.0 %    Lymphocytes % 18.5 (L) 20.0 - 42.0 %    Monocytes % 5.0 2.0 - 12.0 %    Eosinophils % 14.3 (H) 0.0 - 6.0 %    Basophils % 1.0 0.0 - 2.0 %    Neutrophils Absolute 5.02 1.80 - 7.30 E9/L    Immature Granulocytes # 0.03 E9/L    Lymphocytes Absolute 1.53 1.50 - 4.00 E9/L    Monocytes Absolute 0.41 0.10 - 0.95 E9/L    Eosinophils Absolute 1.18 (H) 0.05 - 0.50 E9/L    Basophils Absolute 0.08 0.00 - 0.20 E9/L   Comprehensive Metabolic Panel   Result Value Ref Range    Sodium 138 132 - 146 mmol/L    Potassium 4.1 3.5 - 5.0 mmol/L    Chloride 102 98 - 107 mmol/L    CO2 27 22 - 29 mmol/L    Anion Gap 9 7 - 16 mmol/L    Glucose 293 (H) 74 - 99 mg/dL    BUN 16 6 - 23 mg/dL    Creatinine 1.1 (H) 0.5 - 1.0 mg/dL    Est, Glom Filt Rate 55 >=60 mL/min/1.73    Calcium 9.2 8.6 - 10.2 mg/dL    Total Protein 6.3 (L) 6.4 - 8.3 g/dL    Albumin 3.7 3.5 - 5.2 g/dL    Total Bilirubin 0.6 0.0 - 1.2 mg/dL    Alkaline Phosphatase 106 (H) 35 - 104 U/L    ALT 60 (H) 0 - 32 U/L    AST 30 0 - 31 U/L   Troponin   Result Value Ref Range    Troponin, High Sensitivity 29 (H) 0 - 9 ng/L   Brain Natriuretic Peptide   Result Value Ref Range    Pro-BNP 1,152 (H) 0 - 125 pg/mL   Urinalysis with Microscopic   Result Value Ref Range    Color, UA Yellow Straw/Yellow    Clarity, UA Clear Clear    Glucose, Ur >=1000 (A) Negative mg/dL    Bilirubin Urine Negative Negative    Ketones, Urine Negative Negative mg/dL    Specific Gravity, UA 1.015 1.005 - 1.030    Blood, Urine SMALL (A) Negative    pH, UA 6.0 5.0 - 9.0    Protein, UA 30 (A) Negative mg/dL    Urobilinogen, Urine 0.2 <2.0 E.U./dL    Nitrite, Urine Negative Negative    Leukocyte Esterase, Urine Negative Negative    WBC, UA NONE 0 - 5 /HPF    RBC, UA 0-1 0 - 2 /HPF    Bacteria, UA RARE (A) None Seen /HPF   Troponin   Result Value Ref Range    Troponin, High Sensitivity 24 (H) 0 - 9 ng/L   Troponin   Result Value Ref Range    Troponin, High Sensitivity 25 (H) 0 - 9 ng/L   POCT Glucose   Result Value Ref Range    Meter Glucose 73 (L) 74 - 99 mg/dL   POCT Glucose   Result Value Ref Range    Meter Glucose 171 (H) 74 - 99 mg/dL   EKG 12 Lead   Result Value Ref Range    Ventricular Rate 81 BPM    Atrial Rate 81 BPM    P-R Interval 184 ms    QRS Duration 78 ms    Q-T Interval 388 ms    QTc Calculation (Bazett) 450 ms    P Axis 67 degrees    R Axis 11 degrees    T Axis 57 degrees     EKG: This EKG is signed and interpreted by Dr. Iveth Jane. Rate: 81  Rhythm: Sinus  Interpretation: no acute changes  No ST elevation or depression, No T wave inversion. No QT prolongation. RADIOLOGY:  Interpreted by Radiologist.  Albert Avalos 58   Final Result   No evidence of pulmonary embolism. Bilateral moderate pleural effusions with bibasilar infiltrate/subsegmental   atelectasis. Small 5 mm nodule in the right lower lobe. RECOMMENDATIONS:   I recommend a follow-up CT chest in 1 year for comparison. US DUP LOWER EXTREMITIES BILATERAL VENOUS   Final Result   1. Peroneal right calf DVT. No above the knee DVT identified. 2.  No DVT of the left lower extremity. XR CHEST (2 VW)   Final Result   No active airspace consolidation.       Left pleural effusion.             ------------------------- NURSING NOTES AND VITALS REVIEWED ---------------------------   The nursing notes within the ED encounter and vital signs as below have been reviewed. BP (!) 185/69   Pulse 77   Temp 97.9 °F (36.6 °C) (Oral)   Resp 14   Wt 165 lb (74.8 kg)   SpO2 96%   BMI 27.46 kg/m²   Oxygen Saturation Interpretation: Normal      ---------------------------------------------------PHYSICAL EXAM--------------------------------------    Constitutional/General: Alert and oriented x3, well appearing, non toxic in NAD  Head: NC/AT  Eyes: PERRL, EOMI  Mouth: Oropharynx clear, handling secretions, no trismus  Neck: Supple, full ROM, no meningeal signs  Pulmonary: Lungs clear to auscultation bilaterally, no wheezes, rales, or rhonchi. Not in respiratory distress  Cardiovascular:  Regular rate and rhythm, no murmurs, gallops, or rubs. 1+ dorsalis pedis pulses. 3+ radial pulses  Abdomen: Soft, non tender, non distended,   Extremities: Moves all extremities x 4. FROM. 2+pitting edema of the lower extremities bilaterally. Skin: warm and dry without rash  Neurologic: GCS 15, sensations intact. Psych: Normal Affect      ------------------------------ ED COURSE/MEDICAL DECISION MAKING----------------------  Medications   iopamidol (ISOVUE-370) 76 % injection 75 mL (75 mLs IntraVENous Given 1/4/23 1857)   enoxaparin (LOVENOX) injection 70 mg (70 mg SubCUTAneous Given 1/4/23 2115)   furosemide (LASIX) injection 20 mg (20 mg IntraVENous Given 1/4/23 2113)     Spoke with Dr. Ji Adan (Medicine). Discussed case. They will admit this patient. Medical Decision Making:    Priscilla Fournier is a 79 y.o. female with a pmhx of kidney disease, HTN and T2DM presenting to the ED for bilateral lower extremity and hand swelling for 5 days. She has never had this happen before. Over the course of the 5 days she does state the swelling has worsened.  She describes a pain in her legs that feels like pressure and tightness. She also admits to increased urination. She is on Lasix at home. She called her nephrologist and PCP this morning but they both advised her to come to the ED. She denies any current fever, chills, body aches, HA, blurry vision, cough, SOB, chest pain, abdominal pain, flank pain or numbness/tingling in the extremities. VS are stable. PE revealed 3+ pitting edema of the bilateral lower extremities. Lung sounds were CTA bilaterally. CBC revealed anemia of chronic disease but no leukocytosis. CMP revealed; glucose 293, Cr 1.1, protein of 6.3 and aslk phos of 106. Initial troponin: 29, repeat: 24 repeat: 25 BNP value of 1,152, no previous results available to compare. U/A showed >=1000 glucose. EKG was unremarkable. CXR shows a left pleural effusion but no cardiac enlargement. US DUP LE bilaterally showed a peroneal right calf DVT. CTA showed evidence of PE but did reveal bilateral moderate pleural effusions with bibasilar infiltrate/subsegmental atelectasis. Pt. Was updated and informed of her results, she still denies SOB or chest pain. PT was tx with 70mg SubQ Lovenox and 20mg IV of Lasix. A consult was made to her PCP Dr. Joyce Kraus, he was updated on her ED course and he will admit her to [address] [place] [address]. Pt. Is agreeable to admission. Counseling: The emergency provider has spoken with the patient and discussed todays results, in addition to providing specific details for the plan of care and counseling regarding the diagnosis and prognosis. Questions are answered at this time and they are agreeable with the plan.      --------------------------------- IMPRESSION AND DISPOSITION ---------------------------------    IMPRESSION  1. Acute congestive heart failure, unspecified heart failure type ([facility] [address]    2.  Acute deep vein thrombosis (DVT) of right peroneal vein (H[facility])    3. 3+ pitting edema        DISPOSITION  Disposition: Admit to med/surg floor  Patient condition is good Dahlia Shell PA-C  01/04/23 0495       Dahlia Shell PA-C  01/04/23 9464

## 2023-01-05 PROBLEM — I50.9 CONGESTIVE HEART FAILURE OF UNKNOWN ETIOLOGY (HCC): Status: ACTIVE | Noted: 2023-01-05

## 2023-01-05 PROBLEM — I50.9 ACUTE CONGESTIVE HEART FAILURE (HCC): Status: ACTIVE | Noted: 2023-01-05

## 2023-01-05 LAB
METER GLUCOSE: 132 MG/DL (ref 74–99)
METER GLUCOSE: 207 MG/DL (ref 74–99)
METER GLUCOSE: 300 MG/DL (ref 74–99)

## 2023-01-05 PROCEDURE — 82962 GLUCOSE BLOOD TEST: CPT

## 2023-01-05 PROCEDURE — 99223 1ST HOSP IP/OBS HIGH 75: CPT | Performed by: INTERNAL MEDICINE

## 2023-01-05 PROCEDURE — S5553 INSULIN LONG ACTING 5 U: HCPCS | Performed by: FAMILY MEDICINE

## 2023-01-05 PROCEDURE — 2140000000 HC CCU INTERMEDIATE R&B

## 2023-01-05 PROCEDURE — 2500000003 HC RX 250 WO HCPCS: Performed by: EMERGENCY MEDICINE

## 2023-01-05 PROCEDURE — 6370000000 HC RX 637 (ALT 250 FOR IP): Performed by: FAMILY MEDICINE

## 2023-01-05 PROCEDURE — 6360000002 HC RX W HCPCS: Performed by: FAMILY MEDICINE

## 2023-01-05 RX ORDER — DEXTROSE MONOHYDRATE 100 MG/ML
INJECTION, SOLUTION INTRAVENOUS CONTINUOUS PRN
Status: DISCONTINUED | OUTPATIENT
Start: 2023-01-05 | End: 2023-01-07 | Stop reason: HOSPADM

## 2023-01-05 RX ORDER — INSULIN GLARGINE-YFGN 100 [IU]/ML
22 INJECTION, SOLUTION SUBCUTANEOUS DAILY
Status: DISCONTINUED | OUTPATIENT
Start: 2023-01-05 | End: 2023-01-07 | Stop reason: HOSPADM

## 2023-01-05 RX ORDER — INSULIN LISPRO 100 [IU]/ML
4 INJECTION, SOLUTION INTRAVENOUS; SUBCUTANEOUS
Status: DISCONTINUED | OUTPATIENT
Start: 2023-01-05 | End: 2023-01-07 | Stop reason: HOSPADM

## 2023-01-05 RX ORDER — FUROSEMIDE 10 MG/ML
40 INJECTION INTRAMUSCULAR; INTRAVENOUS DAILY
Status: DISCONTINUED | OUTPATIENT
Start: 2023-01-05 | End: 2023-01-06

## 2023-01-05 RX ORDER — VERAPAMIL HYDROCHLORIDE 80 MG/1
80 TABLET ORAL EVERY 8 HOURS SCHEDULED
Status: DISCONTINUED | OUTPATIENT
Start: 2023-01-05 | End: 2023-01-05 | Stop reason: ALTCHOICE

## 2023-01-05 RX ORDER — INSULIN LISPRO 100 [IU]/ML
0-8 INJECTION, SOLUTION INTRAVENOUS; SUBCUTANEOUS
Status: DISCONTINUED | OUTPATIENT
Start: 2023-01-05 | End: 2023-01-07 | Stop reason: HOSPADM

## 2023-01-05 RX ORDER — ALPRAZOLAM 1 MG/1
1 TABLET ORAL 2 TIMES DAILY
Status: DISCONTINUED | OUTPATIENT
Start: 2023-01-05 | End: 2023-01-07 | Stop reason: HOSPADM

## 2023-01-05 RX ORDER — VERAPAMIL HYDROCHLORIDE 300 MG/1
300 CAPSULE, EXTENDED RELEASE ORAL DAILY
Status: DISCONTINUED | OUTPATIENT
Start: 2023-01-05 | End: 2023-01-05 | Stop reason: CLARIF

## 2023-01-05 RX ORDER — LANOLIN ALCOHOL/MO/W.PET/CERES
400 CREAM (GRAM) TOPICAL DAILY
Status: DISCONTINUED | OUTPATIENT
Start: 2023-01-05 | End: 2023-01-07 | Stop reason: HOSPADM

## 2023-01-05 RX ORDER — ASPIRIN 81 MG/1
81 TABLET ORAL DAILY
Status: DISCONTINUED | OUTPATIENT
Start: 2023-01-05 | End: 2023-01-07 | Stop reason: HOSPADM

## 2023-01-05 RX ORDER — INSULIN LISPRO 100 [IU]/ML
0-4 INJECTION, SOLUTION INTRAVENOUS; SUBCUTANEOUS NIGHTLY
Status: DISCONTINUED | OUTPATIENT
Start: 2023-01-05 | End: 2023-01-07 | Stop reason: HOSPADM

## 2023-01-05 RX ORDER — DILTIAZEM HYDROCHLORIDE 5 MG/ML
10 INJECTION INTRAVENOUS ONCE
Status: COMPLETED | OUTPATIENT
Start: 2023-01-05 | End: 2023-01-05

## 2023-01-05 RX ORDER — METHOCARBAMOL 500 MG/1
500 TABLET, FILM COATED ORAL 4 TIMES DAILY
Status: DISCONTINUED | OUTPATIENT
Start: 2023-01-05 | End: 2023-01-07 | Stop reason: HOSPADM

## 2023-01-05 RX ADMIN — ASPIRIN 81 MG: 81 TABLET, COATED ORAL at 11:10

## 2023-01-05 RX ADMIN — INSULIN LISPRO 6 UNITS: 100 INJECTION, SOLUTION INTRAVENOUS; SUBCUTANEOUS at 11:42

## 2023-01-05 RX ADMIN — APIXABAN 10 MG: 5 TABLET, FILM COATED ORAL at 20:40

## 2023-01-05 RX ADMIN — INSULIN GLARGINE-YFGN 22 UNITS: 100 INJECTION, SOLUTION SUBCUTANEOUS at 11:26

## 2023-01-05 RX ADMIN — ALPRAZOLAM 1 MG: 1 TABLET ORAL at 20:40

## 2023-01-05 RX ADMIN — VERAPAMIL HYDROCHLORIDE 300 MG: 180 TABLET, FILM COATED, EXTENDED RELEASE ORAL at 11:11

## 2023-01-05 RX ADMIN — APIXABAN 10 MG: 5 TABLET, FILM COATED ORAL at 11:10

## 2023-01-05 RX ADMIN — INSULIN LISPRO 4 UNITS: 100 INJECTION, SOLUTION INTRAVENOUS; SUBCUTANEOUS at 18:00

## 2023-01-05 RX ADMIN — INSULIN LISPRO 2 UNITS: 100 INJECTION, SOLUTION INTRAVENOUS; SUBCUTANEOUS at 18:00

## 2023-01-05 RX ADMIN — FUROSEMIDE 40 MG: 10 INJECTION, SOLUTION INTRAMUSCULAR; INTRAVENOUS at 11:12

## 2023-01-05 RX ADMIN — SODIUM ZIRCONIUM CYCLOSILICATE 10 G: 10 POWDER, FOR SUSPENSION ORAL at 22:08

## 2023-01-05 RX ADMIN — Medication 400 MG: at 11:10

## 2023-01-05 RX ADMIN — INSULIN LISPRO 4 UNITS: 100 INJECTION, SOLUTION INTRAVENOUS; SUBCUTANEOUS at 11:46

## 2023-01-05 RX ADMIN — ALPRAZOLAM 1 MG: 1 TABLET ORAL at 11:10

## 2023-01-05 RX ADMIN — DILTIAZEM HYDROCHLORIDE 10 MG: 5 INJECTION INTRAVENOUS at 07:56

## 2023-01-05 ASSESSMENT — PAIN SCALES - GENERAL: PAINLEVEL_OUTOF10: 0

## 2023-01-05 NOTE — DISCHARGE INSTR - COC
Continuity of Care Form    Patient Name: Rickie Rosen   :  1955  MRN:  [id number]    Admit date:  2023  Discharge date:  ***    Code Status Order: Prior   Advance Directives:     Admitting Physician:  No admitting provider for patient encounter. PCP: Daisy Zhu MD    Discharging Nurse: [facility] Care Unit/Room#:   Discharging Unit Phone Number: ***    Emergency Contact:   Extended Emergency Contact Information  Primary Emergency Contact: [address] [place] [address] Phone: [phone]  Relation: Other  Secondary Emergency Contact: Vin Cardona, [address] Phone: [phone]  Relation: Niece/Nephew    Past Surgical History:  Past Surgical History:   Procedure Laterality Date    APPENDECTOMY      CHOLECYSTECTOMY      FRACTURE SURGERY Right     arm    KNEE ARTHROPLASTY Right 2011    Right TKA. Muriel Sheppard MD       Immunization History:   Immunization History   Administered Date(s) Administered    Td (Adult), 2 Lf Tetanus Toxoid, Pf (Td, Absorbed) 2022       Active Problems:  Patient Active Problem List   Diagnosis Code    Bruit of left carotid artery R09.89    Varicose veins of leg with pain, right I83.811    Bilateral carotid artery stenosis I65.23       Isolation/Infection:   Isolation            No Isolation          Patient Infection Status       None to display            Nurse Assessment:  Last Vital Signs: BP (!) 185/69   Pulse 77   Temp 97.9 °F (36.6 °C) (Oral)   Resp 14   Wt 165 lb (74.8 kg)   SpO2 96%   BMI 27.46 kg/m²     Last documented pain score (0-10 scale): Pain Level: 3  Last Weight:   Wt Readings from Last 1 Encounters:   23 165 lb (74.8 kg)     Mental Status:  {IP PT MENTAL STATUS:}    IV Access:  [facility] JAY JAY IV ACCESS:548914470}    Nursing Mobility/ADLs:  Walking   {CHP DME OPWC:072370784}  Transfer  {CHP DME ARQK:224757476}  Bathing  {CHP DME HTEO:717633149}  Dressing  {CHP DME EEJL:103432636}  Toileting  {CHP DME BDIY:749827636}  Feeding {CHP DME WSVL:734443395}  Med Admin  {CHP DME BIKX:476696258}  Med Delivery   { JAY JAY MED Delivery:750652532}    Wound Care Documentation and Therapy:        Elimination:  Continence: Bowel: {YES / JH:42935}  Bladder: {YES / AA:14982}  Urinary Catheter: {Urinary Catheter:492691783}   Colostomy/Ileostomy/Ileal Conduit: {YES / WW:76948}       Date of Last BM: ***  No intake or output data in the 24 hours ending 23 2211  No intake/output data recorded.     Safety Concerns:     [address] [employer] Safety Concerns:807554564}    Impairments/Disabilities:      [employer] Impairments/Disabilities:770010053}    Nutrition Therapy:  Current Nutrition Therapy:   [employer] Diet List:966573602}    Routes of Feeding: {CHP DME Other Feedings:204778519}  Liquids: {Slp liquid thickness:89625}  Daily Fluid Restriction: {CHP DME Yes amt example:068450440}  Last Modified Barium Swallow with Video (Video Swallowing Test): {Done Not Done OAFU:639020362}    Treatments at the Time of Hospital Discharge:   Respiratory Treatments: ***  Oxygen Therapy:  {Therapy; copd oxygen:64024}  Ventilator:    { CC Vent IQOQ:014810188}    Rehab Therapies: {THERAPEUTIC INTERVENTION:7690487302}  Weight Bearing Status/Restrictions: [employer] Weight Bearin}  Other Medical Equipment (for information only, NOT a DME order):  {EQUIPMENT:305318285}  Other Treatments: ***    Patient's personal belongings (please select all that are sent with patient):  {[facility] DME Belongings:581778318}    RN SIGNATURE:  {Esignature:234831375}    CASE MANAGEMENT/SOCIAL WORK SECTION    Inpatient Status Date: ***    Readmission Risk Assessment Score:  Readmission Risk              Risk of Unplanned Readmission:  0           Discharging to Facility/ Agency   Name:   Address:  Phone:  Fax:    Dialysis Facility (if applicable)   Name:  Address:  Dialysis Schedule:  Phone:  Fax:    / signature: {Esignature:467788506}    PHYSICIAN SECTION    Prognosis: {Prognosis:9205456594}    Condition at Discharge: Rosio Bland Patient Condition:508672615}    Rehab Potential (if transferring to Rehab): {Prognosis:5728623645}    Recommended Labs or Other Treatments After Discharge: ***    Physician Certification: I certify the above information and transfer of Katt Carrillo  is necessary for the continuing treatment of the diagnosis listed and that she requires {Admit to Appropriate Level of Care:98084} for {GREATER/LESS:709176116} 30 days.      Update Admission H&P: {CHP DME Changes in CHKQO:013614524}    PHYSICIAN SIGNATURE:  {Esignature:759099467}

## 2023-01-05 NOTE — CARE COORDINATION
1/5 Care Coordination: Pt presenting to the ED for bilateral lower extremity and hand swelling for 5 days. She called her nephrologist and PCP this morning but they both advised her to come to the ED. Dx Acute CHF, DVT. Met with pt. PTA she lives alone. Is independent. HAs a Niece that helps her. She is caring for her dogs while in the hospital. She will transport pt home. No needs identified in Rounds. CM/SW will continue to follow for discharge planning/needs.    Magdi MEDINA,RN--BC  [phone]

## 2023-01-05 NOTE — CONSULTS
Inpatient Cardiology Consultation      Reason for Consult: CHF    Consulting Physician: Dr. Simón Knight    Requesting Physician: Dr. Kesha Espinal    Date of Consultation: 1/5/2023    HISTORY OF PRESENT ILLNESS:       This 71-year-old female is new to [facility] cardiology and denies any cardiac history. She tells me she was at primary care provider on Friday and her blood pressure was elevated at 571 systolic. Her blood pressure is managed by nephrology and she has an upcoming appointment so no changes were made. She presented to the emergency room last evening with swelling of the lower extremities. The swelling of her lower extremities started Friday evening. She had no dyspnea and has gained 20 pounds but its been gradually over the year. She does drink a lot of iced tea and eats a lot of [employer] fries. She denies any chest pain and orthopnea. She had no discomfort in her legs. EKG was sinus rhythm with no acute ST-T wave changes. Blood pressure on admission was 185/89 and heart rate was 88 bpm and she was afebrile with no hypoxia on room air. Blood pressure has elevated to 208/102. Chest x-ray showed a left pleural effusion and ultrasound of the lower extremity showed no DVT on the left but there was a peroneal right calf DVT. CTA of the chest showed a 5 mm nodule on the right and bilateral moderate pleural effusions with bibasilar infiltrate and some segmental atelectasis but no PE. There was no acute abnormality of the thoracic aorta    Potassium was 4.1 with a BUN and creatinine of 16 and 1.1, serum glucose 293, proBNP 1152 and high-sensitivity troponin 29-25, ALT 60, WBCs 8.3 with an H&H of 10 and 31.3    She was treated with diltiazem 10 mg IV and started on therapeutic Lovenox and given 20 mg of Lasix IV around 8 PM last night.     She is currently transition to [facility] and is being diuresed with Lasix 40 mg IV      Past medical history  Tobacco abuse around half a pack every day or so  Hypertension  Insulin-dependent diabetic with neuropathy and nephropathy  CKD  Hyperkalemia and intolerant of ACE inhibitors  Unsure of cholesterol status  Right knee replacement  Mild anemia  Carotid atherosclerosis with ultrasounds on November 3, 2022 showing the right internal carotid to be 50% stenosed in the left internal carotid 60 to 70%  Anxiety  Humeral fracture on the right status post surgery  Cholecystectomy  Varicose veins and phlebitis        Medications Prior to admit:  Prior to Admission medications    Medication Sig Start Date End Date Taking? Authorizing Provider   LOKELMA 10 g PACK oral suspension Take 10 g by mouth daily 11/2/22   Historical Provider, MD   Verapamil 300 MG CP24 Take 300 mg by mouth daily 9/29/22   Historical Provider, MD   gabapentin (NEURONTIN) 100 MG capsule Take 3 capsules by mouth 2 times daily for 180 days. Intended supply: 90 days 1/12/22 1/4/23  Adamaris Martínez DO   magnesium oxide (MAG-OX) 400 MG tablet Take 400 mg by mouth daily    Historical Provider, MD   methocarbamol (ROBAXIN) 500 MG tablet Take 500 mg by mouth 4 times daily    Historical Provider, MD   insulin 70-30 (HUMULIN;NOVOLIN) (70-30) 100 UNIT per ML injection vial Inject into the skin 2 times daily    Historical Provider, MD   Pediatric Multivitamins-Fl (MULTI VIT/FL PO) Take  by mouth. Patient not taking: Reported on 11/3/2022    Historical Provider, MD   alprazolam Phoebe Barks) 1 MG tablet   Take 1 mg by mouth 2 times daily Instructed to take am of procedure    Historical Provider, MD   furosemide (LASIX) 20 MG tablet Take 20 mg by mouth daily. Historical Provider, MD   aspirin 81 MG EC tablet Take 81 mg by mouth daily.       Historical Provider, MD       Current Medications:    Current Facility-Administered Medications: ALPRAZolam (XANAX) tablet 1 mg, 1 mg, Oral, BID  aspirin EC tablet 81 mg, 81 mg, Oral, Daily  magnesium oxide (MAG-OX) tablet 400 mg, 400 mg, Oral, Daily  methocarbamol (ROBAXIN) tablet 500 mg, 500 mg, Oral, 4x Daily  furosemide (LASIX) injection 40 mg, 40 mg, IntraVENous, Daily  apixaban (ELIQUIS) tablet 10 mg, 10 mg, Oral, BID **FOLLOWED BY** [START ON 2023] apixaban (ELIQUIS) tablet 5 mg, 5 mg, Oral, BID  insulin lispro (HUMALOG) injection vial 0-8 Units, 0-8 Units, SubCUTAneous, TID WC  insulin lispro (HUMALOG) injection vial 0-4 Units, 0-4 Units, SubCUTAneous, Nightly  glucose chewable tablet 16 g, 4 tablet, Oral, PRN  dextrose bolus 10% 125 mL, 125 mL, IntraVENous, PRN **OR** dextrose bolus 10% 250 mL, 250 mL, IntraVENous, PRN  glucagon (rDNA) injection 1 mg, 1 mg, SubCUTAneous, PRN  dextrose 10 % infusion, , IntraVENous, Continuous PRN  insulin glargine-yfgn (SEMGLEE-YFGN) injection vial 22 Units, 22 Units, SubCUTAneous, Daily **AND** insulin lispro (HUMALOG) injection vial 4 Units, 4 Units, SubCUTAneous, TID WC  verapamil (CALAN SR) extended release tablet 300 mg, 300 mg, Oral, Daily    Allergies:  Synvisc [hylan g-f 20]    Social History: Smokes varying amount but usually not more than a half a pack a day and occasional alcohol but no illicit drugs and is going through a divorce        Family History: Father  of cancer of the throat and because of mother's death unknown and sister recently  of dementia complications  Family History   Problem Relation Age of Onset    Diabetes Mother     Arthritis Father     Diabetes Father        REVIEW OF SYSTEMS:     Constitutional: Denies fatigue, fevers, chills or night sweats  Eyes: Denies visual changes or drainage  ENT: Denies headaches or hearing loss. No mouth sores or sore throat. No epistaxis   Cardiovascular: Denies chest pain, pressure or palpitations. No lower extremity swelling.   Respiratory: Denies ABDALLA, cough, orthopnea or PND. No hemoptysis   Gastrointestinal: Denies hematemesis or anorexia. No hematochezia or melena    Genitourinary: Denies urgency, dysuria or hematuria.  Musculoskeletal: Denies gait disturbance,  weakness or joint complaints but does have difficulty with balance with right knee giving out but no falls  Integumentary: Denies rash, hives or pruritis   Neurological: Denies dizziness, headaches or seizures. No numbness or tingling but admits to neuropathy in the lower extremities which limits her activity  Psychiatric: Denies depression. Endocrine: Denies temperature intolerance. No recent weight change. .  Hematologic/Lymphatic: Denies abnormal bruising or bleeding. No swollen lymph nodes    PHYSICAL EXAM:   BP (!) 192/73   Pulse 83   Temp 98.2 °F (36.8 °C) (Temporal)   Resp 15   Wt 165 lb (74.8 kg)   SpO2 97%   BMI 27.46 kg/m²   CONST:  Well developed, well nourished who appears of stated age. Awake, alert and cooperative. No apparent distress. HEENT:   Head- Normocephalic, atraumatic   Eyes- Conjunctivae pink, anicteric  Throat- Oral mucosa pink and moist  Neck-  No stridor, trachea midline, no jugular venous distention. No carotid bruit. CHEST: Chest symmetrical and non-tender to palpation. No accessory muscle use or intercostal retractions  RESPIRATORY: Lung sounds - clear throughout fields   CARDIOVASCULAR:     Heart Inspection- shows no noted pulsations  Heart Palpation- no heaves or thrills; PMI is non-displaced   Heart Ausculation- Regular rate and rhythm, or 6 systolic 2 over murmur. No s3, s4 or rub   PV: 1+ lower extremity edema bilaterally. No varicosities. Pedal pulses palpable, no clubbing or cyanosis   ABDOMEN: Soft, non-tender to light palpation. Bowel sounds present. No palpable masses no organomegaly; no abdominal bruit  MS: Good muscle strength and tone. No atrophy or abnormal movements. : Deferred  SKIN: Warm and dry no statis dermatitis or ulcers   NEURO / PSYCH: Oriented to person, place and time. Speech clear and appropriate. Follows all commands.  Pleasant affect but appears anxious    DATA:    ECG / Tele strips: Sinus rhythm  Diagnostic:    No intake or output data in the 24 hours ending 01/05/23 1120    Labs:   CBC:   Recent Labs     01/04/23  1405   WBC 8.3   HGB 10.0*   HCT 31.3*        BMP:   Recent Labs     01/04/23  1405      K 4.1   CO2 27   BUN 16   CREATININE 1.1*   LABGLOM 55   CALCIUM 9.2     Mag: No results for input(s): MG in the last 72 hours. Phos: No results for input(s): PHOS in the last 72 hours. TFT:   Lab Results   Component Value Date    TSH 0.993 06/08/2011      HgA1c:   Lab Results   Component Value Date/Time    LABA1C 9.2 06/08/2011 09:26 AM     No results found for: EAG  proBNP:   Lab Results   Component Value Date/Time    PROBNP 1,152 01/04/2023 02:05 PM     PT/INR: No results for input(s): PROTIME, INR in the last 72 hours. APTT:No results for input(s): APTT in the last 72 hours.   TROPONIN:  Lab Results   Component Value Date/Time    TROPHS 25 01/04/2023 06:35 PM    TROPHS 24 01/04/2023 03:29 PM    TROPHS 29 01/04/2023 02:05 PM     CK:No results found for: CKTOTAL  FASTING LIPID PANEL:No results found for: CHOL, HDL, LDLDIRECT, LDLCALC, TRIG  LIVER PROFILE:  Recent Labs     01/04/23  1405   AST 30   ALT 60*   LABALBU 3.7       Electronically signed by NEWTON Figueroa CNP on 1/5/2023 at 11:20 AM      Impression and plan by Dr. Jamal Villela

## 2023-01-06 LAB
ANION GAP SERPL CALCULATED.3IONS-SCNC: 7 MMOL/L (ref 7–16)
BUN BLDV-MCNC: 17 MG/DL (ref 6–23)
CALCIUM SERPL-MCNC: 9.2 MG/DL (ref 8.6–10.2)
CHLORIDE BLD-SCNC: 106 MMOL/L (ref 98–107)
CO2: 28 MMOL/L (ref 22–29)
CREAT SERPL-MCNC: 1.2 MG/DL (ref 0.5–1)
GFR SERPL CREATININE-BSD FRML MDRD: 49 ML/MIN/1.73
GLUCOSE BLD-MCNC: 67 MG/DL (ref 74–99)
HCT VFR BLD CALC: 31.6 % (ref 34–48)
HEMOGLOBIN: 10.2 G/DL (ref 11.5–15.5)
LV EF: 63 %
LVEF MODALITY: NORMAL
MCH RBC QN AUTO: 29.4 PG (ref 26–35)
MCHC RBC AUTO-ENTMCNC: 32.3 % (ref 32–34.5)
MCV RBC AUTO: 91.1 FL (ref 80–99.9)
METER GLUCOSE: 111 MG/DL (ref 74–99)
METER GLUCOSE: 175 MG/DL (ref 74–99)
METER GLUCOSE: 234 MG/DL (ref 74–99)
METER GLUCOSE: 397 MG/DL (ref 74–99)
METER GLUCOSE: 70 MG/DL (ref 74–99)
PDW BLD-RTO: 13.2 FL (ref 11.5–15)
PLATELET # BLD: 320 E9/L (ref 130–450)
PMV BLD AUTO: 9.5 FL (ref 7–12)
POTASSIUM SERPL-SCNC: 4 MMOL/L (ref 3.5–5)
PRO-BNP: 954 PG/ML (ref 0–125)
RBC # BLD: 3.47 E12/L (ref 3.5–5.5)
SODIUM BLD-SCNC: 141 MMOL/L (ref 132–146)
WBC # BLD: 7.8 E9/L (ref 4.5–11.5)

## 2023-01-06 PROCEDURE — 99232 SBSQ HOSP IP/OBS MODERATE 35: CPT | Performed by: INTERNAL MEDICINE

## 2023-01-06 PROCEDURE — 36415 COLL VENOUS BLD VENIPUNCTURE: CPT

## 2023-01-06 PROCEDURE — 80048 BASIC METABOLIC PNL TOTAL CA: CPT

## 2023-01-06 PROCEDURE — S5553 INSULIN LONG ACTING 5 U: HCPCS | Performed by: FAMILY MEDICINE

## 2023-01-06 PROCEDURE — 6360000002 HC RX W HCPCS: Performed by: FAMILY MEDICINE

## 2023-01-06 PROCEDURE — 93306 TTE W/DOPPLER COMPLETE: CPT

## 2023-01-06 PROCEDURE — 82962 GLUCOSE BLOOD TEST: CPT

## 2023-01-06 PROCEDURE — 6370000000 HC RX 637 (ALT 250 FOR IP): Performed by: INTERNAL MEDICINE

## 2023-01-06 PROCEDURE — 83880 ASSAY OF NATRIURETIC PEPTIDE: CPT

## 2023-01-06 PROCEDURE — 2140000000 HC CCU INTERMEDIATE R&B

## 2023-01-06 PROCEDURE — 6370000000 HC RX 637 (ALT 250 FOR IP): Performed by: FAMILY MEDICINE

## 2023-01-06 PROCEDURE — 85027 COMPLETE CBC AUTOMATED: CPT

## 2023-01-06 RX ORDER — CARVEDILOL 6.25 MG/1
6.25 TABLET ORAL 2 TIMES DAILY WITH MEALS
Status: DISCONTINUED | OUTPATIENT
Start: 2023-01-06 | End: 2023-01-07 | Stop reason: HOSPADM

## 2023-01-06 RX ORDER — HYDRALAZINE HYDROCHLORIDE 25 MG/1
25 TABLET, FILM COATED ORAL EVERY 8 HOURS SCHEDULED
Status: DISCONTINUED | OUTPATIENT
Start: 2023-01-06 | End: 2023-01-07 | Stop reason: HOSPADM

## 2023-01-06 RX ORDER — HYDRALAZINE HYDROCHLORIDE 20 MG/ML
10 INJECTION INTRAMUSCULAR; INTRAVENOUS EVERY 4 HOURS PRN
Status: DISCONTINUED | OUTPATIENT
Start: 2023-01-06 | End: 2023-01-07 | Stop reason: HOSPADM

## 2023-01-06 RX ORDER — FUROSEMIDE 20 MG/1
20 TABLET ORAL DAILY
Status: DISCONTINUED | OUTPATIENT
Start: 2023-01-06 | End: 2023-01-07 | Stop reason: HOSPADM

## 2023-01-06 RX ADMIN — ALPRAZOLAM 1 MG: 1 TABLET ORAL at 21:17

## 2023-01-06 RX ADMIN — FUROSEMIDE 20 MG: 40 TABLET ORAL at 18:20

## 2023-01-06 RX ADMIN — VERAPAMIL HYDROCHLORIDE 300 MG: 180 TABLET, FILM COATED, EXTENDED RELEASE ORAL at 09:11

## 2023-01-06 RX ADMIN — SODIUM ZIRCONIUM CYCLOSILICATE 10 G: 10 POWDER, FOR SUSPENSION ORAL at 23:15

## 2023-01-06 RX ADMIN — Medication 400 MG: at 09:12

## 2023-01-06 RX ADMIN — APIXABAN 10 MG: 5 TABLET, FILM COATED ORAL at 09:11

## 2023-01-06 RX ADMIN — INSULIN LISPRO 4 UNITS: 100 INJECTION, SOLUTION INTRAVENOUS; SUBCUTANEOUS at 16:14

## 2023-01-06 RX ADMIN — INSULIN LISPRO 8 UNITS: 100 INJECTION, SOLUTION INTRAVENOUS; SUBCUTANEOUS at 14:40

## 2023-01-06 RX ADMIN — APIXABAN 10 MG: 5 TABLET, FILM COATED ORAL at 21:16

## 2023-01-06 RX ADMIN — FUROSEMIDE 40 MG: 10 INJECTION, SOLUTION INTRAMUSCULAR; INTRAVENOUS at 09:11

## 2023-01-06 RX ADMIN — HYDRALAZINE HYDROCHLORIDE 25 MG: 25 TABLET, FILM COATED ORAL at 21:17

## 2023-01-06 RX ADMIN — ALPRAZOLAM 1 MG: 1 TABLET ORAL at 09:11

## 2023-01-06 RX ADMIN — INSULIN GLARGINE-YFGN 22 UNITS: 100 INJECTION, SOLUTION SUBCUTANEOUS at 09:12

## 2023-01-06 RX ADMIN — INSULIN LISPRO 4 UNITS: 100 INJECTION, SOLUTION INTRAVENOUS; SUBCUTANEOUS at 14:42

## 2023-01-06 RX ADMIN — ASPIRIN 81 MG: 81 TABLET, COATED ORAL at 09:11

## 2023-01-06 RX ADMIN — CARVEDILOL 6.25 MG: 6.25 TABLET, FILM COATED ORAL at 18:20

## 2023-01-06 ASSESSMENT — ENCOUNTER SYMPTOMS
SHORTNESS OF BREATH: 1
ABDOMINAL PAIN: 0

## 2023-01-06 ASSESSMENT — PAIN SCALES - GENERAL
PAINLEVEL_OUTOF10: 0

## 2023-01-06 NOTE — H&P
HISTORY AND PHYSICAL             Date: 1/6/2023        Patient Name: Jose Bruno     YOB: 1955      Age:  79 y.o. Chief Complaint     Chief Complaint   Patient presents with    Joint Swelling     All over edema that began on Saturday. Has history of kidney failure    Other     Feet and knee swelling started last week        History Obtained From   patient    History of Present Illness   Patient has worsening swelling in her arms and legs the past four days and failed an outpatient increase in her oral diuretics. Past Medical History     Past Medical History:   Diagnosis Date    Anxiety     Bilateral carotid artery stenosis 10/7/2021    Bruit of left carotid artery 7/9/2020    Decreased dorsalis pedis pulse 7/9/2020    Humeral fracture     right    Hypertension     Kidney disease     Left carotid stenosis 7/9/2020    Leg pain     Osteoarthritis of right knee 5/4/2011    Superficial phlebitis of leg, right 8/12/2021    Type II or unspecified type diabetes mellitus without mention of complication, not stated as uncontrolled     Varicose veins of leg with pain, right 8/12/2021        Past Surgical History     Past Surgical History:   Procedure Laterality Date    APPENDECTOMY      CHOLECYSTECTOMY      FRACTURE SURGERY Right     arm    KNEE ARTHROPLASTY Right 2011    Right TKA. Devyn Person MD        Medications Prior to Admission     Prior to Admission medications    Medication Sig Start Date End Date Taking? Authorizing Provider   LOKELMA 10 g PACK oral suspension Take 10 g by mouth daily 11/2/22   Historical Provider, MD   Verapamil 300 MG CP24 Take 300 mg by mouth daily 9/29/22   Historical Provider, MD   gabapentin (NEURONTIN) 100 MG capsule Take 3 capsules by mouth 2 times daily for 180 days.  Intended supply: 90 days 1/12/22 1/4/23  Cameron Wilson DO   magnesium oxide (MAG-OX) 400 MG tablet Take 400 mg by mouth daily    Historical Provider, MD   methocarbamol (ROBAXIN) 500 MG tablet Take 500 mg by mouth 4 times daily    Historical Provider, MD   insulin 70-30 (HUMULIN;NOVOLIN) (70-30) 100 UNIT per ML injection vial Inject into the skin 2 times daily    Historical Provider, MD   Pediatric Multivitamins-Fl (MULTI VIT/FL PO) Take  by mouth. Patient not taking: Reported on 11/3/2022    Historical Provider, MD   alprazolam Mary Carmen Nest) 1 MG tablet   Take 1 mg by mouth 2 times daily Instructed to take am of procedure    Historical Provider, MD   furosemide (LASIX) 20 MG tablet Take 20 mg by mouth daily. Historical Provider, MD   aspirin 81 MG EC tablet Take 81 mg by mouth daily. Historical Provider, MD        Allergies   Synvisc [hylan g-f 20]    Social History     Social History       Tobacco History       Smoking Status  Some Days      Smokeless Tobacco Use  Never              Alcohol History       Alcohol Use Status  Yes Drinks/Week  0 Standard drinks or equivalent per week Amount  0.0 standard drinks of alcohol/wk Comment  occasional               Drug Use       Drug Use Status  No              Sexual Activity       Sexually Active  Not Asked                    Family History     Family History   Problem Relation Age of Onset    Diabetes Mother     Arthritis Father     Diabetes Father        Review of Systems   Review of Systems   Constitutional:  Positive for activity change. HENT:  Negative for congestion. Respiratory:  Positive for shortness of breath. Cardiovascular:  Positive for leg swelling. Negative for chest pain. Gastrointestinal:  Negative for abdominal pain. Genitourinary:  Negative for difficulty urinating. Neurological:  Negative for dizziness. Physical Exam   BP (!) 167/67   Pulse 71   Temp 97.3 °F (36.3 °C) (Temporal)   Resp 20   Ht 5' 5\" (1.651 m)   Wt 165 lb (74.8 kg)   SpO2 97%   BMI 27.46 kg/m²     Physical Exam  Vitals reviewed. HENT:      Head: Normocephalic.       Mouth/Throat:      Mouth: Mucous membranes are moist.   Eyes:      Pupils: Pupils are equal, round, and reactive to light. Cardiovascular:      Rate and Rhythm: Normal rate and regular rhythm. Pulses: Normal pulses. Heart sounds: Normal heart sounds. Pulmonary:      Effort: Pulmonary effort is normal. No respiratory distress. Breath sounds: Normal breath sounds. No wheezing. Abdominal:      General: Abdomen is flat. Bowel sounds are normal. There is no distension. Tenderness: There is no abdominal tenderness. Musculoskeletal:         General: Swelling present. Right lower leg: Edema present. Left lower leg: Edema present. Skin:     General: Skin is warm and dry. Capillary Refill: Capillary refill takes less than 2 seconds. Neurological:      General: No focal deficit present. Mental Status: She is alert and oriented to person, place, and time. Psychiatric:         Mood and Affect: Mood normal.       Labs      Recent Results (from the past 24 hour(s))   POCT Glucose    Collection Time: 01/05/23 11:07 AM   Result Value Ref Range    Meter Glucose 300 (H) 74 - 99 mg/dL   POCT Glucose    Collection Time: 01/05/23  4:34 PM   Result Value Ref Range    Meter Glucose 207 (H) 74 - 99 mg/dL   POCT Glucose    Collection Time: 01/05/23  8:35 PM   Result Value Ref Range    Meter Glucose 132 (H) 74 - 99 mg/dL   POCT Glucose    Collection Time: 01/06/23  6:24 AM   Result Value Ref Range    Meter Glucose 70 (L) 74 - 99 mg/dL        Imaging/Diagnostics Last 24 Hours   XR CHEST (2 VW)    Result Date: 1/4/2023  EXAMINATION: TWO XRAY VIEWS OF THE CHEST 1/4/2023 2:29 pm COMPARISON: None. HISTORY: ORDERING SYSTEM PROVIDED HISTORY: cough TECHNOLOGIST PROVIDED HISTORY: Reason for exam:->cough FINDINGS: The lungs are clear. The heart is not enlarged. There is no pneumothorax. There is blunting of the left costophrenic angle. No active airspace consolidation. Left pleural effusion.      CTA CHEST W CONTRAST    Result Date: 1/4/2023  EXAMINATION: CTA OF THE CHEST 1/4/2023 6:39 pm TECHNIQUE: CTA of the chest was performed after the administration of intravenous contrast.  Multiplanar reformatted images are provided for review. MIP images are provided for review. Automated exposure control, iterative reconstruction, and/or weight based adjustment of the mA/kV was utilized to reduce the radiation dose to as low as reasonably achievable. COMPARISON: None. HISTORY: ORDERING SYSTEM PROVIDED HISTORY: abnormal cxr TECHNOLOGIST PROVIDED HISTORY: Reason for exam:->abnormal cxr Decision Support Exception - unselect if not a suspected or confirmed emergency medical condition->Emergency Medical Condition (MA) FINDINGS: Pulmonary Arteries: Pulmonary arteries are adequately opacified for evaluation. No evidence of intraluminal filling defect to suggest pulmonary embolism. Main pulmonary artery is normal in caliber. Mediastinum: No evidence of mediastinal lymphadenopathy. The heart and pericardium demonstrate no acute abnormality. There is no acute abnormality of the thoracic aorta. Lungs/pleura: The lungs are satisfactorily expanded with bilateral moderate pleural effusions. There are bibasilar infiltrates present. There is a small 5 mm nodule in the right lower lobe. Upper Abdomen: Limited images of the upper abdomen are unremarkable. Soft Tissues/Bones: No acute bone or soft tissue abnormality. No evidence of pulmonary embolism. Bilateral moderate pleural effusions with bibasilar infiltrate/subsegmental atelectasis. Small 5 mm nodule in the right lower lobe. RECOMMENDATIONS: I recommend a follow-up CT chest in 1 year for comparison.      US DUP LOWER EXTREMITIES BILATERAL VENOUS    Result Date: 1/4/2023  EXAMINATION: DUPLEX VENOUS ULTRASOUND OF THE BILATERAL LOWER EXTREMITIES1/4/2023 2:21 pm TECHNIQUE: Duplex ultrasound using B-mode/gray scaled imaging, Doppler spectral analysis and color flow Doppler was obtained of the deep venous structures of the lower bilateral extremities. COMPARISON: 09/03/2021 HISTORY: ORDERING SYSTEM PROVIDED HISTORY: Discomfort TECHNOLOGIST PROVIDED HISTORY: Reason for exam:->Discomfort What reading provider will be dictating this exam?->CRC FINDINGS: Right lower extremity: At the right calf, occlusive thrombus within the peroneal veins. No additional DVT identified. The popliteal, superficial femoral vein, and common femoral vein on the right appear patent. Left lower extremity: The left lower extremity deep veins including left calf deep veins appear patent. No DVT. 1.  Peroneal right calf DVT. No above the knee DVT identified. 2.  No DVT of the left lower extremity. Assessment      Hospital Problems             Last Modified POA    * (Principal) Congestive heart failure of unknown etiology ([facility] [address] 1/5/2023 Yes    Acute congestive heart failure ([facility] [address] 1/5/2023 Yes   Acute on chronic diastolic CHF  RLE DVT  HTN Urgency  CKD II  Anemia- at baseline  DM    Plan   Cardiology following  IV diureses  ECHO  Anticoagulation  Anemia at has baseline  Continue home meds.   Monitor labs and exam.    Consultations Ordered:  IP CONSULT TO HOSPITALIST  IP CONSULT TO CARDIOLOGY  IP CONSULT TO HEART FAILURE NURSE/COORDINATOR    Electronically signed by Paul Brandt MD on 1/6/23 at 6:42 AM EST

## 2023-01-06 NOTE — PROGRESS NOTES
INPATIENT CARDIOLOGY FOLLOW-UP    Name: Lemuel Rizvi    Age: 79 y.o. Date of Admission: 1/4/2023 12:52 PM    Date of Service: 1/6/2023    Chief Complaint: Follow-up for CHF    Interim History:  No new overnight cardiac complaints. Currently with no complaints of CP, SOB, palpitations, dizziness, or lightheadedness. SR on telemetry. Review of Systems:   Cardiac: As per HPI  General: No fever, chills  Pulmonary: As per HPI  HEENT: No visual disturbances, difficult swallowing  GI: No nausea, vomiting  Endocrine: No thyroid disease or DM  Musculoskeletal: ELIAS x 4, no focal motor deficits  Skin: Intact, no rashes  Neuro/Psych: No headache or seizures    Problem List:  Patient Active Problem List   Diagnosis    Bruit of left carotid artery    Varicose veins of leg with pain, right    Bilateral carotid artery stenosis    Acute congestive heart failure (HCC)    Congestive heart failure of unknown etiology ([facility] [address]       Allergies:   Allergies   Allergen Reactions    Synvisc [Hylan G-F 20]      Local swelling       Current Medications:  Current Facility-Administered Medications   Medication Dose Route Frequency Provider Last Rate Last Admin    ALPRAZolam Edward Juan Carlos) tablet 1 mg  1 mg Oral BID Patricia Paul MD   1 mg at 01/06/23 0911    aspirin EC tablet 81 mg  81 mg Oral Daily Patricia Paul MD   81 mg at 01/06/23 0911    magnesium oxide (MAG-OX) tablet 400 mg  400 mg Oral Daily Patricia Paul MD   400 mg at 01/06/23 0912    methocarbamol (ROBAXIN) tablet 500 mg  500 mg Oral 4x Daily Patricia Paul MD        furosemide (LASIX) injection 40 mg  40 mg IntraVENous Daily Patricia Paul MD   40 mg at 01/06/23 0911    apixaban (ELIQUIS) tablet 10 mg  10 mg Oral BID Patricia Paul MD   10 mg at 01/06/23 9631    Followed by    Manny Yeager ON 1/12/2023] apixaban (ELIQUIS) tablet 5 mg  5 mg Oral BID Patricia Paul MD        insulin lispro (HUMALOG) injection vial 0-8 Units  0-8 Units SubCUTAneous TID LANDEN Burt MD   8 Units at 01/06/23 1440    insulin lispro (HUMALOG) injection vial 0-4 Units  0-4 Units SubCUTAneous Nightly Gary Burt MD        glucose chewable tablet 16 g  4 tablet Oral PRN Gary Burt MD        dextrose bolus 10% 125 mL  125 mL IntraVENous PRN Gary Butr MD        Or    dextrose bolus 10% 250 mL  250 mL IntraVENous PRN Gary Burt MD        glucagon (rDNA) injection 1 mg  1 mg SubCUTAneous PRN Gary Burt MD        dextrose 10 % infusion   IntraVENous Continuous PRN Gary Burt MD        insulin [facility]) injection vial 22 Units  22 Units SubCUTAneous Daily Gary Burt MD   22 Units at 01/06/23 0912    And    insulin lispro (HUMALOG) injection vial 4 Units  4 Units SubCUTAneous TID  Gary Burt MD   4 Units at 01/06/23 1614    verapamil (CALAN SR) extended release tablet 300 mg  300 mg Oral Daily Gary Burt MD   300 mg at 01/06/23 3161    perflutren lipid microspheres (DEFINITY) injection 1.5 mL  1.5 mL IntraVENous ONCE PRN Meena Cedillo MD        sodium zirconium cyclosilicate (LOKELMA) oral suspension 10 g  10 g Oral Daily Gary Burt MD   10 g at 01/05/23 2208      dextrose         Physical Exam:  BP (!) 174/77   Pulse 88   Temp 98 °F (36.7 °C)   Resp 18   Ht 5' 5\" (1.651 m)   Wt 165 lb (74.8 kg)   SpO2 93%   BMI 27.46 kg/m²   Wt Readings from Last 3 Encounters:   01/05/23 165 lb (74.8 kg)   08/08/22 155 lb (70.3 kg)   06/04/22 160 lb (72.6 kg)     Appearance: Awake, alert, no acute respiratory distress  Skin: Intact, no rash  Head: Normocephalic, atraumatic  Eyes: EOMI, no conjunctival erythema  ENMT: No pharyngeal erythema, MMM, no rhinorrhea  Neck: Supple, no elevated JVP, no carotid bruits  Lungs: Clear to auscultation bilaterally. No wheezes, rales, or rhonchi.   Cardiac: Regular rate and rhythm, +S1S2, no murmurs apparent  Abdomen: Soft, nontender, +bowel sounds  Extremities: Moves all extremities x 4, no lower extremity edema  Neurologic: No focal motor deficits apparent, normal mood and affect  Peripheral Pulses: Intact posterior tibial pulses bilaterally    Intake/Output:    Intake/Output Summary (Last 24 hours) at 1/6/2023 1700  Last data filed at 1/6/2023 1606  Gross per 24 hour   Intake 180 ml   Output 2150 ml   Net -1970 ml     I/O this shift:  In: 180 [P.O.:180]  Out: 1150 [Urine:1150]    Laboratory Tests:  Recent Labs     01/04/23  1405 01/06/23  0641    141   K 4.1 4.0    106   CO2 27 28   BUN 16 17   CREATININE 1.1* 1.2*   GLUCOSE 293* 67*   CALCIUM 9.2 9.2     Lab Results   Component Value Date/Time    MG 2.2 01/12/2022 04:30 PM     Recent Labs     01/04/23  1405   ALKPHOS 106*   ALT 60*   AST 30   PROT 6.3*   BILITOT 0.6   LABALBU 3.7     Recent Labs     01/04/23  1405 01/06/23  0641   WBC 8.3 7.8   RBC 3.43* 3.47*   HGB 10.0* 10.2*   HCT 31.3* 31.6*   MCV 91.3 91.1   MCH 29.2 29.4   MCHC 31.9* 32.3   RDW 13.5 13.2    320   MPV 9.7 9.5     Lab Results   Component Value Date    TROPONINI 0.01 04/09/2020    TROPONINI <0.01 02/09/2019     No results found for: INR, PROTIME  Lab Results   Component Value Date    TSH 0.993 06/08/2011     Lab Results   Component Value Date    LABA1C 9.2 (H) 06/08/2011     No results found for: EAG  No results found for: CHOL  No results found for: TRIG  No results found for: HDL  No results found for: LDLCALC, LDLCHOLESTEROL  No results found for: LABVLDL, VLDL  No results found for: CHOLHDLRATIO    Cardiac Tests:     Telemetry findings reviewed: SR at rate 80s, no new tachy/bradyarrhythmias overnight     EKG: NSR, normal EKG     Labs and vitals were reviewed,: As above blood pressure 120/83     Labs-BUNs/creatinine 16/1.1, proBNP 1152, troponin, high-sensitivity 29 and 25, AST/ALT 30/60, H&H 10.0/31.3     CTA chest-1/4/2023: No pulmonary embolism, bilateral moderate pleural effusions, with bibasilar infiltrate or subsegmental atelectasis small 5 mm nodule    TTE-1/6/2023:  Normal left ventricle size and systolic function. Ejection fraction is visually estimated at 60-65%. No regional wall motion abnormalities seen. Normal left ventricle wall thickness. Indeterminate diastolic function. Normal right ventricular size and function. TAPSE 25 mm. No hemodynamically significant aortic stenosis is present. Physiologic and/or trace tricuspid regurgitation. RVSP is 33 mmHg. Normal estimated PA systolic pressure. No evidence for hemodynamically significant pericardial effusion. Assessment:  Chronic heart failure most likely diastolic, euvolemic  Bilateral pleural effusions rule out CHF echo showed normal LV systolic function with indeterminate diastolic function with elevated estimated LV filling pressures. Hypertensive urgency, resolved  Poorly controlled hypertension, improving. 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 daily   Restrict daily salt intake to less than 2 g  Strict input output charting  Evaluation of anemia as per primary service  Continue verapamil  mg p.o. daily. We will add Coreg 6.25 mg p.o. daily for better blood pressure control and hydralazine 25 mg p.o. 3 times a day. Add hydralazine 10 mg IV every 4 hours if the systolic blood pressure remains more than 140 mmHg.   Management of diabetes as per primary service  Continue continue Eliquis 10 mg p.o. twice daily x7 days and then 5 mg twice daily  If she remains stable then she can be discharged home in a.m. From the cardiology standpoint. Follow-up with cardiology service in 1 month. More  than 35 minutes  was spent counseling the patient, reviewing the medications, results, assessment and the rationale for the above recommendations. John Chase MD., Flavia Miller.   [facility]) Cardiology

## 2023-01-06 NOTE — CARE COORDINATION
1/6/22  Spoke with patient regarding transition of care. Patient admitted for DVT, acute CHF with 3+pitting edema. Patient is on lasix with intake and output are being monitored. Patients anticoagulation therapy expected to be changed to oral at discharge. Patient is planned for an ECHO to assess LV function. Patient was independent prior to admit and plans to return home with no needs. Patient has a niece that helps her out and will provide transport home once medically stable. SW/JOSSELIN to follow.     Electronically signed by MO Sharma on 1/6/2023 at 10:51 AM

## 2023-01-06 NOTE — DISCHARGE INSTRUCTIONS
HEART FAILURE  / CONGESTIVE HEART FAILURE  DISCHARGE INSTRUCTIONS:  GUIDELINES TO FOLLOW AT HOME    Self- Managed Care:     MEDICATIONS:  Take your medication as directed. If you are experiencing any side effects, inform your doctor, Do not stop taking any of your medications without letting your doctor know. Check with your doctor before taking any over-the-counter medications / herbal / or dietary supplements. They may interfere with your other medications. Do not take ibuprofen (Advil or Motrin) and naproxen (Aleve) without talking to your doctor first. They could make your heart failure worse. WEIGHT MONITORING:   Weigh yourself everyday (with the same scale) around the same time of the day and write it down. (you can chart them on a calendar or keep track of them on paper. Notify your doctor of a weight gain of 3 pounds or more in 1 day   OR a total of 5 pounds or more in 1 week    Take your weight record to your doctor visits  Also, the same goes if you loose more than 3# in one day, let your heart doctor know. DIET:   Cardiac heart healthy diet- Low saturated / low trans fat, no added salt, caffeine restricted, Low sodium diet-   No more than 2,000mg (2 grams) of salt / sodium per day (which equals to a little less than  a teaspoon of salt)  If your doctor wants you on a fluid restriction. ..it is usually recommended a fluid limit of 2,000cc -  Fluid restriction- 2,000 ml (milliliters) = 64 ounces = you can have 8 glasses of fluid per day (each glass 8 ounces)    Follow a low salt diet - avoid using salt at the table, avoid / limit use of canned soups, processed / packaged foods, salted snacks, olives and pickles. Do not use a salt substitute without checking with your doctor, they may contain a high amount of potassioum. (Mrs. Lethaniel Frankel is safe to use).     Limit the use of alcohol       CALL YOUR DOCTOR THE FIRST DAY YOU NOTICE ANY OF THESE   SYMPTOMS:  You have a weight gain of 3 pounds or more in 1 day         OR 5 pounds or more in one week  More shortness of breath  More swelling of your stomach, legs, ankles or feet  Feeling more tired, No energy  Dry hacky cough  Dizziness  More chest pain / discomfort       (CALL 911 IF ANY OF THE FOLLOWING OCCURS  Chest pain (not relieved with nitroglycerine, if you have been prescribed this medication)  Severe shortness of breath  Faint / Pass out  Confusion / cannot think clearly  If symptoms get worse           SMOKING - TOBACCO USE:  * IF YOU SMOKE - STOP! Kick the habit. [address] Program is offered at [facility] [address] and [address] [facility]. Call [phone] extension [phone] for more information. ACTIVITY:   (Ask your doctor when you will be able to return to work and before starting any exercise program.  Do not drive unless unless your doctor has given you permission to do so). Start light exercise. Even if you can only do a small amount, exercise will help you get stronger, have more energy, help manage your weight and decrease  stress. Walking is an easy way to get exercise. Start out slowly and  increase the amount you walk as tolerated  If you become short of breath, dizzy or have chest pain; stop, sit down, and rest.  If you feel \"wiped out\" the day after you exercise, walk at a slower pace or for a shorter distance. You can gradually increase the pace or amount of time. (Do not exercise right after a meal or in extreme temperatures, such as above 85 degrees, if the air is really humid, or wind chill is less than 20 degrees)                                             ADDITIONAL INFORMATION:  Avoid getting sick from colds and the flu. Stay away from friends or family that you know may have a contagious illness  Get plenty of rest   Get a flu shot each year. Get a pneumococcal vaccine shot.  If you have had one before, ask your doctor whether you need another dose. My Goal for Self-management of Heart Failure Includes 5 steps :    1. Notice a change in symptoms ( weight gain, short of breath, leg swelling, decreased activity level, bloating. ...)    2. Evaluate the change: (use the Heart Failure Zones )     3. Decide to take action: decide what your options are, such as: (call your doctor for an extra visit, take a prescribed medication, such as your water pill if your doctor has given you directions to do so, [address])    4. Come up with a strategy:  (now you call the doctor for advice / appointment. This is where you take action!!! Do not wait, catch the symptom early and treat it before it worsens. 5. Evaluate the response: The next day, check your Heart Failure Zones: are you in the GREEN ZONE (safe zone)? Worsening symptoms of YELLOW ZONE? Or have you moved to the RED ZONE and need to call 911 or go to the Emergency Room for evaluation? Call your doctor's office to update them on your symptoms of heart failure.

## 2023-01-06 NOTE — PLAN OF CARE
Patient's chart updated to reflect:      . - HF care plan, HF education points and HF discharge instructions.  -Orders: 2 gram sodium diet, daily weights, I/O.  -PCP and cardiology follow up appointments to be scheduled within 7 days of hospital discharge. -CHF education session will be provided to the patient prior to hospital discharge.     Antonio Jimenez RN RN, BSN  Heart Failure Navigator

## 2023-01-06 NOTE — CONSULTS
CHF NURSE NAVIGATOR CONSULT NOTE:      Patient currently admitted with diagnosis of Diastolic heart failure. Patient was alert and oriented during the consultation. She was engaged and asked appropriate questions throughout the education session. She is agreeable to self monitoring, medication compliance, following a low sodium diet, and outpatient follow up. Scheduling with the CHF clinic, cardiology APRN and PCP ( Dr. Henok Gil 1/13 @ 10:15 )Yes. Future Appointments   Date Time Provider Florencio Brock   1/20/2023 10:30 AM [facility] CHF ROOM 1 [facility] Rivera   2/1/2023 12:00 PM NEWTON Duval - CNP [facility] Mclain Nick   5/18/2023  2:00 PM [facility] Gileskorin   5/18/2023  2:30 PM Betzaida Cardona MD [facility]       Barriers to Care:  Contributing risk factors for Heart Failure are identified as overwhelmed by complexity of regimen. The patient is ordered:  Diet: ADULT DIET; Regular; 4 carb choices (60 gm/meal); Low Sodium (2 gm)   Sodium controlled diet Yes  Fluid restriction daily ordered (fluid restriction recommended if patient is hyponatremic and/or diuretic is initiated or increased) No  FR:   Daily Weights: Patient Vitals for the past 96 hrs (Last 3 readings):   Weight   01/05/23 0845 165 lb (74.8 kg)   01/04/23 1256 165 lb (74.8 kg)     I/O:   Intake/Output Summary (Last 24 hours) at 1/6/2023 1305  Last data filed at 1/6/2023 6930  Gross per 24 hour   Intake 180 ml   Output 1000 ml   Net -820 ml              We reviewed the introduction to Heart Failure, the HF zones, signs and symptoms to report on day 1 of onset, medications, medication compliance, the importance of obtaining daily weights, following a low sodium diet, reading food labels for the sodium content, keeping physician appointments, and smoking cessation. We discussed writing / tracking daily weights on a calendar / log because a 5 pound gain in 1 week can sneak up if you are not tracking it. I advised patient they can reduce the risk for Heart Failure exacerbations by modifying / controlling the risk factors. We discussed self-managed care which includes the following:  to take medications as prescribed, report any intolerable side effects of medications to the cardiologist / doctor, do not just stop taking the medication; follow a cardiac heart healthy / low sodium diet; weigh yourself daily, exercise regularly- per doctor recommendation and not to smoke or use an excess amount of alcohol. We discussed calling the cardiologist / doctor with a weight gain of 3 pounds in one day or a total of 5 pounds or more in one week. Also, if you should have a significant weight loss of 3# or more in one day to call the doctor, they may need to decrease or hold the diuretic dose. On days you feels nauseated and not eating / drinking, having emesis or diarrhea,  informed to call the cardiologist  / doctor, they may need to decrease or hold diuretic to avoid dehydration. I stressed the importance of informing their cardiologist the first day of onset of any of the signs and symptoms in the \"Yellow Zone\" of the HF Zones. Patient verbalizes understanding. Greater than 30 minutes was spent educating patient. The Heart Failure Booklet given to the patient with additional patient education addressing:  What is Heart Failure? Things You Can Do to Live Well with HF  How to Take Your Medications  How to Eat Less Salt  [place] its Salt?   Exercising Well with Heart Failure  Signs and symptoms of HF to report  Weight Yourself Each Day  Home Self Management- activity, weight tracking, taking medications as prescribed, meals /diet planning (sodium and fluid restriction), how to read food labels, keeping physician follow ups, smoking cessation, follow the Heart Failure Zones  The Heart Failure zones  Every Dose Every Day      Instructed  to call 911 if you have any of the following symptoms: Struggling to breathe unrelieved with rest,     Having chest pain     Have confusion or cant think clearly          Ashley Dow RN BSN, RN  Heart Failure [address] [facility] (CHF) AHA GUIDELINES  (Must be completed for Primary Diagnosis CHF or History of CHF)    Discharge Plan:  I placed the Heart Failure Home Instructions in patient's discharge instructions. Per Heart Failure [facility], the patient should have a follow-up appointment made within 7 days of discharge.     New Diagnosis Yes    ECHO Results most recent:  No results found for: LVEF, LVEFMODE                                     Social History     Tobacco Use   Smoking Status Some Days   Smokeless Tobacco Never        Immunization History   Administered Date(s) Administered    Td (Adult), 2 Lf Tetanus Toxoid, Pf (Td, Absorbed) 06/04/2022          Angiotensin-Converting-Enzyme (ACE) inhibitor ordered:  [] Yes  [x] No (specify contraindication): hyperkalemia    [] Contraindicated  [] Hypotensive patient who was at immediate risk of cardiogenic shock  [] Hospitalized patient who experienced marked azotemia  [] Other Contraindications  [] Not Eligible  [] Not Tolerant  [] Patient Reason  [] System Reason  [] Other Reason    Angiotensin II receptor blockers (ARB) ordered:  [] Yes  [x] No (specify contraindication):    [] Contraindicated  [] Hypotensive patient who was at immediate risk of cardiogenic shock  [] Hospitalized patient who experienced marked azotemia  [] Other Contraindications    ARNI - Angiotensin Receptor Neprilysin Inhibitor ordered:  [] Yes  [x] No (specify contraindication):    [] ACE inhibitor use within the prior 36 hours  [] Allergy  [x] Hyperkalemia  [] Hypotension  [] Renal dysfunction defined as creatinine > 2.5 mg/dL in men or > 2.0 mg/dL in women  [] Other Contraindications  [] Not Eligible  [] Not Tolerant  [] Patient Reason  []System Reason  []Other Reason      Beta Blocker ordered:    [x] Yes Coreg  [] No (specify contraindication):    [] Contraindicated  [] Asthma  [] Fluid Overload  [] Low Blood Pressure  [] Patient recently treated with an intravenous positive inotropic agent  [] Other Contraindications  [] Not Eligible  [] Not Tolerant  [] Patient Reason  [] System Reason    SGLT2 Inhibitor ordered:  [] Yes  [x] No (specify contraindication):    [] Contraindicated  [] Patient currently on dialysis  [] Ketoacidosis  [] Known hypersensitivity to the medication  [] Type I diabetes (not approved for use in patients with Type I diabetes due to increased risk of ketoacidosis)  [] Other Contraindications  [] Not Eligible  [] Not Tolerant  [] Patient Reason  [] System Reason  [] Other Reason    Aldosterone Antagonist ordered:  [] Yes  [x] No (specify contraindication):    [] Contraindicated  [] Allergy due to aldosterone receptor antagonist  [] Hyperkalemia  [] Renal dysfunction defined as creatinine >2.5 mg/dL in men or >2.0 mg/dL in women.  [] Other contraindications  [] Not Eligible  [] Not Tolerant  [] Patient Reason  [] System Reason  [] Other Reason

## 2023-01-07 VITALS
OXYGEN SATURATION: 94 % | SYSTOLIC BLOOD PRESSURE: 138 MMHG | BODY MASS INDEX: 25.34 KG/M2 | DIASTOLIC BLOOD PRESSURE: 52 MMHG | HEART RATE: 76 BPM | TEMPERATURE: 98.2 F | WEIGHT: 152.13 LBS | RESPIRATION RATE: 18 BRPM | HEIGHT: 65 IN

## 2023-01-07 LAB
ANION GAP SERPL CALCULATED.3IONS-SCNC: 9 MMOL/L (ref 7–16)
BUN BLDV-MCNC: 23 MG/DL (ref 6–23)
CALCIUM SERPL-MCNC: 8.7 MG/DL (ref 8.6–10.2)
CHLORIDE BLD-SCNC: 101 MMOL/L (ref 98–107)
CO2: 29 MMOL/L (ref 22–29)
CREAT SERPL-MCNC: 1.4 MG/DL (ref 0.5–1)
GFR SERPL CREATININE-BSD FRML MDRD: 41 ML/MIN/1.73
GLUCOSE BLD-MCNC: 77 MG/DL (ref 74–99)
HCT VFR BLD CALC: 28.7 % (ref 34–48)
HEMOGLOBIN: 9.4 G/DL (ref 11.5–15.5)
MCH RBC QN AUTO: 29.5 PG (ref 26–35)
MCHC RBC AUTO-ENTMCNC: 32.8 % (ref 32–34.5)
MCV RBC AUTO: 90 FL (ref 80–99.9)
METER GLUCOSE: 350 MG/DL (ref 74–99)
PDW BLD-RTO: 13.2 FL (ref 11.5–15)
PLATELET # BLD: 298 E9/L (ref 130–450)
PMV BLD AUTO: 9.9 FL (ref 7–12)
POTASSIUM SERPL-SCNC: 3.6 MMOL/L (ref 3.5–5)
RBC # BLD: 3.19 E12/L (ref 3.5–5.5)
SODIUM BLD-SCNC: 139 MMOL/L (ref 132–146)
WBC # BLD: 8.8 E9/L (ref 4.5–11.5)

## 2023-01-07 PROCEDURE — 6370000000 HC RX 637 (ALT 250 FOR IP): Performed by: INTERNAL MEDICINE

## 2023-01-07 PROCEDURE — 36415 COLL VENOUS BLD VENIPUNCTURE: CPT

## 2023-01-07 PROCEDURE — S5553 INSULIN LONG ACTING 5 U: HCPCS | Performed by: FAMILY MEDICINE

## 2023-01-07 PROCEDURE — 85027 COMPLETE CBC AUTOMATED: CPT

## 2023-01-07 PROCEDURE — 80048 BASIC METABOLIC PNL TOTAL CA: CPT

## 2023-01-07 PROCEDURE — 82962 GLUCOSE BLOOD TEST: CPT

## 2023-01-07 PROCEDURE — 6370000000 HC RX 637 (ALT 250 FOR IP): Performed by: FAMILY MEDICINE

## 2023-01-07 RX ORDER — CARVEDILOL 6.25 MG/1
6.25 TABLET ORAL 2 TIMES DAILY WITH MEALS
Qty: 60 TABLET | Refills: 3 | Status: SHIPPED | OUTPATIENT
Start: 2023-01-07

## 2023-01-07 RX ORDER — HYDRALAZINE HYDROCHLORIDE 25 MG/1
25 TABLET, FILM COATED ORAL EVERY 8 HOURS SCHEDULED
Qty: 90 TABLET | Refills: 3 | Status: SHIPPED | OUTPATIENT
Start: 2023-01-07

## 2023-01-07 RX ADMIN — HYDRALAZINE HYDROCHLORIDE 25 MG: 25 TABLET, FILM COATED ORAL at 06:24

## 2023-01-07 RX ADMIN — ALPRAZOLAM 1 MG: 1 TABLET ORAL at 08:47

## 2023-01-07 RX ADMIN — VERAPAMIL HYDROCHLORIDE 300 MG: 180 TABLET, FILM COATED, EXTENDED RELEASE ORAL at 08:46

## 2023-01-07 RX ADMIN — INSULIN LISPRO 8 UNITS: 100 INJECTION, SOLUTION INTRAVENOUS; SUBCUTANEOUS at 11:53

## 2023-01-07 RX ADMIN — METHOCARBAMOL 500 MG: 500 TABLET ORAL at 08:47

## 2023-01-07 RX ADMIN — APIXABAN 10 MG: 5 TABLET, FILM COATED ORAL at 08:46

## 2023-01-07 RX ADMIN — FUROSEMIDE 20 MG: 40 TABLET ORAL at 08:48

## 2023-01-07 RX ADMIN — INSULIN GLARGINE-YFGN 22 UNITS: 100 INJECTION, SOLUTION SUBCUTANEOUS at 08:53

## 2023-01-07 RX ADMIN — Medication 400 MG: at 08:48

## 2023-01-07 RX ADMIN — ASPIRIN 81 MG: 81 TABLET, COATED ORAL at 08:48

## 2023-01-07 RX ADMIN — INSULIN LISPRO 4 UNITS: 100 INJECTION, SOLUTION INTRAVENOUS; SUBCUTANEOUS at 11:55

## 2023-01-07 RX ADMIN — CARVEDILOL 6.25 MG: 6.25 TABLET, FILM COATED ORAL at 08:48

## 2023-01-07 ASSESSMENT — PAIN SCALES - GENERAL: PAINLEVEL_OUTOF10: 0

## 2023-01-07 NOTE — PROGRESS NOTES
Monitor dcd cleaned and placed in slot in nurses station. Discharge instructions and meds reviewed with patient.

## 2023-01-07 NOTE — DISCHARGE SUMMARY
Discharge Summary    Date: 1/7/2023  Patient Name: Sarina Bender    YOB: 1955     Age: 79 y.o. Admit Date: 1/4/2023  Discharge Date: 1/7/2023  Discharge Condition: Stable    Admission Diagnosis  Acute deep vein thrombosis (DVT) of right peroneal vein ([facility] [I82.451]; Acute congestive heart failure, unspecified heart failure type (HCC) [I50.9];3+ pitting edema [R60.9]; Congestive heart failure of unknown etiology ([address] [I50.9]      Discharge Diagnosis  Principal Problem:    Congestive heart failure of unknown etiology ([facility]  Active Problems:    Acute congestive heart failure ([facility]  Resolved Problems:    * No resolved hospital problems. [facility] AND [facility] Stay  Narrative of Hospital Course:  Patient admitted for  * (Principal) Congestive heart failure of unknown etiology ([facility] 1/5/2023 Yes     Acute congestive heart failure ([facility] 1/5/2023 Yes  Acute on chronic diastolic CHF  RLE DVT  HTN Urgency  CKD II  Anemia- at baseline  DM        Improved with IV lasix  Cardiology did ECHO, grossly normal.  Adjusted anti hypertensives  Started Eliquis for right lower ext. DVT    Consultants:  IP CONSULT TO HOSPITALIST  IP CONSULT TO CARDIOLOGY  IP CONSULT TO HEART FAILURE NURSE/COORDINATOR    Surgeries/procedures Performed:      Treatments:            Discharge Plan/Disposition:  Home    Hospital/Incidental Findings Requiring Follow Up:    Patient Instructions:    Diet: Cardiac Diet and Diabetic Diet    Activity:Activity as Tolerated  For number of days (if applicable): Other Instructions:    Provider Follow-Up:   No follow-ups on file.      Significant Diagnostic Studies:    Recent Labs:  Admission on 01/04/2023  WBC                                           Date: 01/04/2023  Value: 8.3         Ref range: 4.5 - 11.5 E9/L    Status: Final  RBC                                           Date: 01/04/2023  Value: 3.43 (A)    Ref range: 3.50 - 5.50 E12/L  Status: Final  Hemoglobin Date: 01/04/2023  Value: 10.0 (A)    Ref range: 11.5 - 15.5 g/dL   Status: Final  Hematocrit                                    Date: 01/04/2023  Value: 31.3 (A)    Ref range: 34.0 - 48.0 %      Status: Final  MCV                                           Date: 01/04/2023  Value: 91.3        Ref range: 80.0 - 99.9 fL     Status: Final  MCH                                           Date: 01/04/2023  Value: 29.2        Ref range: 26.0 - 35.0 pg     Status: Final  MCHC                                          Date: 01/04/2023  Value: 31.9 (A)    Ref range: 32.0 - 34.5 %      Status: Final  RDW                                           Date: 01/04/2023  Value: 13.5        Ref range: 11.5 - 15.0 fL     Status: Final  Platelets                                     Date: 01/04/2023  Value: 304         Ref range: 130 - 450 E9/L     Status: Final  MPV                                           Date: 01/04/2023  Value: 9.7         Ref range: 7.0 - 12.0 fL      Status: Final  Neutrophils %                                 Date: 01/04/2023  Value: 60.8        Ref range: 43.0 - 80.0 %      Status: Final  Immature Granulocytes %                       Date: 01/04/2023  Value: 0.4         Ref range: 0.0 - 5.0 %        Status: Final  Lymphocytes %                                 Date: 01/04/2023  Value: 18.5 (A)    Ref range: 20.0 - 42.0 %      Status: Final  Monocytes %                                   Date: 01/04/2023  Value: 5.0         Ref range: 2.0 - 12.0 %       Status: Final  Eosinophils %                                 Date: 01/04/2023  Value: 14.3 (A)    Ref range: 0.0 - 6.0 %        Status: Final  Basophils %                                   Date: 01/04/2023  Value: 1.0         Ref range: 0.0 - 2.0 %        Status: Final  Neutrophils Absolute                          Date: 01/04/2023  Value: 5.02        Ref range: 1.80 - 7.30 E9/L   Status: Final  Immature Granulocytes #                       Date: 01/04/2023  Value: 0.03 Ref range: E9/L               Status: Final  Lymphocytes Absolute                          Date: 01/04/2023  Value: 1.53        Ref range: 1.50 - 4.00 E9/L   Status: Final  Monocytes Absolute                            Date: 01/04/2023  Value: 0.41        Ref range: 0.10 - 0.95 E9/L   Status: Final  Eosinophils Absolute                          Date: 01/04/2023  Value: 1.18 (A)    Ref range: 0.05 - 0.50 E9/L   Status: Final  Basophils Absolute                            Date: 01/04/2023  Value: 0.08        Ref range: 0.00 - 0.20 E9/L   Status: Final  Sodium                                        Date: 01/04/2023  Value: 138         Ref range: 132 - 146 mmol/L   Status: Final  Potassium                                     Date: 01/04/2023  Value: 4.1         Ref range: 3.5 - 5.0 mmol/L   Status: Final  Chloride                                      Date: 01/04/2023  Value: 102         Ref range: 98 - 107 mmol/L    Status: Final  CO2                                           Date: 01/04/2023  Value: 27          Ref range: 22 - 29 mmol/L     Status: Final  Anion Gap                                     Date: 01/04/2023  Value: 9           Ref range: 7 - 16 mmol/L      Status: Final  Glucose                                       Date: 01/04/2023  Value: 293 (A)     Ref range: 74 - 99 mg/dL      Status: Final  BUN                                           Date: 01/04/2023  Value: 16          Ref range: 6 - 23 mg/dL       Status: Final  Creatinine                                    Date: 01/04/2023  Value: 1.1 (A)     Ref range: 0.5 - 1.0 mg/dL    Status: Final  Est, Glom Filt Rate                           Date: 01/04/2023  Value: 55          Ref range: >=60 mL/min/1.73   Status: Final                Comment: Pediatric calculator link  [email or web address]. [email or web address]/gfr_[email or web address]  Effective Oct 3, 2022  These results are not intended for use in patients  <25years of age.   eGFR results are calculated without  a race factor using the 2021 CKD-EPI equation. Careful  clinical correlation is recommended, particularly when  comparing to results calculated using previous equations. The CKD-EPI equation is less accurate in patients with  extremes of muscle mass, extra-renal metabolism of  creatinine, excessive creatinine ingestion, or following  therapy that affects renal tubular secretion. Calcium                                       Date: 01/04/2023  Value: 9.2         Ref range: 8.6 - 10.2 mg/dL   Status: Final  Total Protein                                 Date: 01/04/2023  Value: 6.3 (A)     Ref range: 6.4 - 8.3 g/dL     Status: Final  Albumin                                       Date: 01/04/2023  Value: 3.7         Ref range: 3.5 - 5.2 g/dL     Status: Final  Total Bilirubin                               Date: 01/04/2023  Value: 0.6         Ref range: 0.0 - 1.2 mg/dL    Status: Final  Alkaline Phosphatase                          Date: 01/04/2023  Value: 106 (A)     Ref range: 35 - 104 U/L       Status: Final  ALT                                           Date: 01/04/2023  Value: 60 (A)      Ref range: 0 - 32 U/L         Status: Final  AST                                           Date: 01/04/2023  Value: 30          Ref range: 0 - 31 U/L         Status: Final  Troponin, High Sensitivity                    Date: 01/04/2023  Value: 29 (A)      Ref range: 0 - 9 ng/L         Status: Final                Comment: High Sensitivity Troponin values cannot be compared with  other Troponin methodologies. Patients with high levels of Biotin oral intake (i.e. >5 mg/day)  may have falsely decreased Troponin levels. Samples collected  within 8 hours of biotin intake may require additional information  for diagnosis.     Pro-BNP                                       Date: 01/04/2023  Value: 1,152 (A)   Ref range: 0 - 125 pg/mL      Status: Final  Ventricular Rate                              Date: 01/04/2023  Value: 81          Ref range: BPM                Status: Final  Atrial Rate                                   Date: 01/04/2023  Value: 81          Ref range: BPM                Status: Final  P-R Interval                                  Date: 01/04/2023  Value: 184         Ref range: ms                 Status: Final  QRS Duration                                  Date: 01/04/2023  Value: 78          Ref range: ms                 Status: Final  Q-T Interval                                  Date: 01/04/2023  Value: 388         Ref range: ms                 Status: Final  QTc Calculation (Bazett)                      Date: 01/04/2023  Value: 450         Ref range: ms                 Status: Final  P Axis                                        Date: 01/04/2023  Value: 67          Ref range: degrees            Status: Final  R Axis                                        Date: 01/04/2023  Value: 11          Ref range: degrees            Status: Final  T Axis                                        Date: 01/04/2023  Value: 57          Ref range: degrees            Status: Final  Color, UA                                     Date: 01/04/2023  Value: Yellow      Ref range: Straw/Yellow       Status: Final  Clarity, UA                                   Date: 01/04/2023  Value: Clear       Ref range: Clear              Status: Final  Glucose, Ur                                   Date: 01/04/2023  Value: >=1000 (A)  Ref range: Negative mg/dL     Status: Final  Bilirubin Urine                               Date: 01/04/2023  Value: Negative    Ref range: Negative           Status: Final  Ketones, Urine                                Date: 01/04/2023  Value: Negative    Ref range: Negative mg/dL     Status: Final  Specific Shipman, UA                          Date: 01/04/2023  Value: 1.015       Ref range: 1.005 - 1.030      Status: Final  Blood, Urine                                  Date: 01/04/2023  Value: SMALL (A)   Ref range: Negative           Status: Final  pH, UA                                        Date: 01/04/2023  Value: 6.0         Ref range: 5.0 - 9.0          Status: Final  Protein, UA                                   Date: 01/04/2023  Value: 30 (A)      Ref range: Negative mg/dL     Status: Final  Urobilinogen, Urine                           Date: 01/04/2023  Value: 0.2         Ref range: <2.0 E.U./dL       Status: Final  Nitrite, Urine                                Date: 01/04/2023  Value: Negative    Ref range: Negative           Status: Final  Leukocyte Esterase, Urine                     Date: 01/04/2023  Value: Negative    Ref range: Negative           Status: Final  WBC, UA                                       Date: 01/04/2023  Value: NONE        Ref range: 0 - 5 /HPF         Status: Final  RBC, UA                                       Date: 01/04/2023  Value: 0-1         Ref range: 0 - 2 /HPF         Status: Final  Bacteria, UA                                  Date: 01/04/2023  Value: RARE (A)    Ref range: None Seen /HPF     Status: Final  Troponin, High Sensitivity                    Date: 01/04/2023  Value: 24 (A)      Ref range: 0 - 9 ng/L         Status: Final                Comment: High Sensitivity Troponin values cannot be compared with  other Troponin methodologies. Patients with high levels of Biotin oral intake (i.e. >5 mg/day)  may have falsely decreased Troponin levels. Samples collected  within 8 hours of biotin intake may require additional information  for diagnosis. Troponin, High Sensitivity                    Date: 01/04/2023  Value: 25 (A)      Ref range: 0 - 9 ng/L         Status: Final                Comment: High Sensitivity Troponin values cannot be compared with  other Troponin methodologies. Patients with high levels of Biotin oral intake (i.e. >5 mg/day)  may have falsely decreased Troponin levels.  Samples collected  within 8 hours of biotin intake may require additional information  for diagnosis.     Meter Glucose                                 Date: 01/04/2023  Value: 73 (A)      Ref range: 74 - 99 mg/dL      Status: Final  Meter Glucose                                 Date: 01/04/2023  Value: 171 (A)     Ref range: 74 - 99 mg/dL      Status: Final  Meter Glucose                                 Date: 01/05/2023  Value: 300 (A)     Ref range: 74 - 99 mg/dL      Status: Final  Meter Glucose                                 Date: 01/05/2023  Value: 207 (A)     Ref range: 74 - 99 mg/dL      Status: Final  WBC                                           Date: 01/06/2023  Value: 7.8         Ref range: 4.5 - 11.5 E9/L    Status: Final  RBC                                           Date: 01/06/2023  Value: 3.47 (A)    Ref range: 3.50 - 5.50 E12/L  Status: Final  Hemoglobin                                    Date: 01/06/2023  Value: 10.2 (A)    Ref range: 11.5 - 15.5 g/dL   Status: Final  Hematocrit                                    Date: 01/06/2023  Value: 31.6 (A)    Ref range: 34.0 - 48.0 %      Status: Final  MCV                                           Date: 01/06/2023  Value: 91.1        Ref range: 80.0 - 99.9 fL     Status: Final  MCH                                           Date: 01/06/2023  Value: 29.4        Ref range: 26.0 - 35.0 pg     Status: Final  MCHC                                          Date: 01/06/2023  Value: 32.3        Ref range: 32.0 - 34.5 %      Status: Final  RDW                                           Date: 01/06/2023  Value: 13.2        Ref range: 11.5 - 15.0 fL     Status: Final  Platelets                                     Date: 01/06/2023  Value: 320         Ref range: 130 - 450 E9/L     Status: Final  MPV                                           Date: 01/06/2023  Value: 9.5         Ref range: 7.0 - 12.0 fL      Status: Final  Sodium                                        Date: 01/06/2023  Value: 141         Ref range: 132 - 146 mmol/L   Status: Final  Potassium                                     Date: 01/06/2023  Value: 4.0         Ref range: 3.5 - 5.0 mmol/L   Status: Final  Chloride                                      Date: 01/06/2023  Value: 106         Ref range: 98 - 107 mmol/L    Status: Final  CO2                                           Date: 01/06/2023  Value: 28          Ref range: 22 - 29 mmol/L     Status: Final  Anion Gap                                     Date: 01/06/2023  Value: 7           Ref range: 7 - 16 mmol/L      Status: Final  Glucose                                       Date: 01/06/2023  Value: 67 (A)      Ref range: 74 - 99 mg/dL      Status: Final  BUN                                           Date: 01/06/2023  Value: 17          Ref range: 6 - 23 mg/dL       Status: Final  Creatinine                                    Date: 01/06/2023  Value: 1.2 (A)     Ref range: 0.5 - 1.0 mg/dL    Status: Final  Est, Glom Filt Rate                           Date: 01/06/2023  Value: 49          Ref range: >=60 mL/min/1.73   Status: Final                Comment: Pediatric calculator link  https://www.kidney.org/professionals/kdoqi/gfr_calculatorped  Effective Oct 3, 2022  These results are not intended for use in patients  <18 years of age.  eGFR results are calculated without  a race factor using the 2021 CKD-EPI equation.  Careful  clinical correlation is recommended, particularly when  comparing to results calculated using previous equations.  The CKD-EPI equation is less accurate in patients with  extremes of muscle mass, extra-renal metabolism of  creatinine, excessive creatinine ingestion, or following  therapy that affects renal tubular secretion.    Calcium                                       Date: 01/06/2023  Value: 9.2         Ref range: 8.6 - 10.2 mg/dL   Status: Final  Pro-BNP                                       Date: 01/06/2023  Value: 954 (A)     Ref range: 0 - 125 pg/mL      Status: Final  Meter Glucose                     Date: 01/05/2023  Value: 132 (A)     Ref range: 74 - 99 mg/dL      Status: Final  Meter Glucose                                 Date: 01/06/2023  Value: 70 (A)      Ref range: 74 - 99 mg/dL      Status: Final  Meter Glucose                                 Date: 01/06/2023  Value: 234 (A)     Ref range: 74 - 99 mg/dL      Status: Final  Meter Glucose                                 Date: 01/06/2023  Value: 397 (A)     Ref range: 74 - 99 mg/dL      Status: Final  Meter Glucose                                 Date: 01/06/2023  Value: 175 (A)     Ref range: 74 - 99 mg/dL      Status: Final  WBC                                           Date: 01/07/2023  Value: 8.8         Ref range: 4.5 - 11.5 E9/L    Status: Final  RBC                                           Date: 01/07/2023  Value: 3.19 (A)    Ref range: 3.50 - 5.50 E12/L  Status: Final  Hemoglobin                                    Date: 01/07/2023  Value: 9.4 (A)     Ref range: 11.5 - 15.5 g/dL   Status: Final  Hematocrit                                    Date: 01/07/2023  Value: 28.7 (A)    Ref range: 34.0 - 48.0 %      Status: Final  MCV                                           Date: 01/07/2023  Value: 90.0        Ref range: 80.0 - 99.9 fL     Status: Final  MCH                                           Date: 01/07/2023  Value: 29.5        Ref range: 26.0 - 35.0 pg     Status: Final  MCHC                                          Date: 01/07/2023  Value: 32.8        Ref range: 32.0 - 34.5 %      Status: Final  RDW                                           Date: 01/07/2023  Value: 13.2        Ref range: 11.5 - 15.0 fL     Status: Final  Platelets                                     Date: 01/07/2023  Value: 298         Ref range: 130 - 450 E9/L     Status: Final  MPV                                           Date: 01/07/2023  Value: 9.9         Ref range: 7.0 - 12.0 fL      Status: Final  Sodium                                        Date: 01/07/2023  Value: 139         Ref range: 132 - 146 mmol/L   Status: Final  Potassium                                     Date: 01/07/2023  Value: 3.6         Ref range: 3.5 - 5.0 mmol/L   Status: Final  Chloride                                      Date: 01/07/2023  Value: 101         Ref range: 98 - 107 mmol/L    Status: Final  CO2                                           Date: 01/07/2023  Value: 29          Ref range: 22 - 29 mmol/L     Status: Final  Anion Gap                                     Date: 01/07/2023  Value: 9           Ref range: 7 - 16 mmol/L      Status: Final  Glucose                                       Date: 01/07/2023  Value: 77          Ref range: 74 - 99 mg/dL      Status: Final  BUN                                           Date: 01/07/2023  Value: 23          Ref range: 6 - 23 mg/dL       Status: Final  Creatinine                                    Date: 01/07/2023  Value: 1.4 (A)     Ref range: 0.5 - 1.0 mg/dL    Status: Final  Est, Glom Filt Rate                           Date: 01/07/2023  Value: 41          Ref range: >=60 mL/min/1.73   Status: Final                Comment: Pediatric calculator link  [email or web address]. org/professionals/kdoqi/gfr_calculatorped  Effective Oct 3, 2022  These results are not intended for use in patients  <25years of age. eGFR results are calculated without  a race factor using the 2021 CKD-EPI equation. Careful  clinical correlation is recommended, particularly when  comparing to results calculated using previous equations. The CKD-EPI equation is less accurate in patients with  extremes of muscle mass, extra-renal metabolism of  creatinine, excessive creatinine ingestion, or following  therapy that affects renal tubular secretion.     Calcium                                       Date: 01/07/2023  Value: 8.7         Ref range: 8.6 - 10.2 mg/dL   Status: Final  Meter Glucose                                 Date: 01/06/2023  Value: 111 (A)     Ref range: 74 - 99 mg/dL      Status: Final  ------------    Radiology last 7 days:  XR CHEST (2 VW)    Result Date: 1/4/2023  No active airspace consolidation. Left pleural effusion. CTA CHEST W CONTRAST    Result Date: 1/4/2023  No evidence of pulmonary embolism. Bilateral moderate pleural effusions with bibasilar infiltrate/subsegmental atelectasis. Small 5 mm nodule in the right lower lobe. RECOMMENDATIONS: I recommend a follow-up CT chest in 1 year for comparison. US DUP LOWER EXTREMITIES BILATERAL VENOUS    Result Date: 1/4/2023  1. Peroneal right calf DVT. No above the knee DVT identified. 2.  No DVT of the left lower extremity. [unfilled]    Discharge Medications    Current Discharge Medication List    START taking these medications    !! apixaban (ELIQUIS) 5 MG TABS tablet  Take 2 tablets by mouth 2 times daily for 7 days  Qty: 28 tablet Refills: 0    !! apixaban (ELIQUIS) 5 MG TABS tablet  Take 1 tablet by mouth 2 times daily  Qty: 60 tablet Refills: 1    hydrALAZINE (APRESOLINE) 25 MG tablet  Take 1 tablet by mouth every 8 hours  Qty: 90 tablet Refills: 3    carvedilol (COREG) 6.25 MG tablet  Take 1 tablet by mouth 2 times daily (with meals)  Qty: 60 tablet Refills: 3    verapamil (CALAN SR) 180 MG extended release tablet  Take 1.5 tablets by mouth daily  Qty: 30 tablet Refills: 3    !! - Potential duplicate medications found. Please discuss with provider. Current Discharge Medication List        Current Discharge Medication List    CONTINUE these medications which have NOT CHANGED    LOKELMA 10 g PACK oral suspension  Take 10 g by mouth daily    magnesium oxide (MAG-OX) 400 MG tablet  Take 400 mg by mouth daily    methocarbamol (ROBAXIN) 500 MG tablet  Take 500 mg by mouth 4 times daily    insulin 70-30 (HUMULIN;NOVOLIN) (70-30) 100 UNIT per ML injection vial  Inject into the skin 2 times daily    Pediatric Multivitamins-Fl (MULTI VIT/FL PO)  Take  by mouth.       alprazolam Edcarlos enrique Rangel) 1 MG tablet    Take 1 mg by mouth 2 times daily Instructed to take am of procedure    furosemide (LASIX) 20 MG tablet  Take 20 mg by mouth daily. aspirin 81 MG EC tablet  Take 81 mg by mouth daily. Current Discharge Medication List    STOP taking these medications    Verapamil 300 MG CP24  Comments:  Reason for Stopping:    gabapentin (NEURONTIN) 100 MG capsule  Comments:  Reason for Stopping:          Time Spent on Discharge:  15 minutes were spent in patient examination, evaluation, counseling as well as medication reconciliation, prescriptions for required medications, discharge plan, and follow up.     Electronically signed by Westley Christianson MD on 1/7/23 at 7:37 AM EST

## 2023-01-18 ENCOUNTER — TELEPHONE (OUTPATIENT)
Dept: CARDIOLOGY CLINIC | Age: 68
End: 2023-01-18

## 2023-01-18 ENCOUNTER — HOSPITAL ENCOUNTER (OUTPATIENT)
Dept: OTHER | Age: 68
Setting detail: THERAPIES SERIES
Discharge: HOME | End: 2023-01-18
Payer: MEDICARE

## 2023-01-18 VITALS
RESPIRATION RATE: 18 BRPM | BODY MASS INDEX: 25.63 KG/M2 | OXYGEN SATURATION: 99 % | DIASTOLIC BLOOD PRESSURE: 56 MMHG | WEIGHT: 154 LBS | SYSTOLIC BLOOD PRESSURE: 119 MMHG | HEART RATE: 77 BPM

## 2023-01-18 LAB
ANION GAP SERPL CALCULATED.3IONS-SCNC: 11 MMOL/L (ref 7–16)
BUN BLDV-MCNC: 29 MG/DL (ref 6–23)
CALCIUM SERPL-MCNC: 9.4 MG/DL (ref 8.6–10.2)
CHLORIDE BLD-SCNC: 96 MMOL/L (ref 98–107)
CO2: 26 MMOL/L (ref 22–29)
CREAT SERPL-MCNC: 1.4 MG/DL (ref 0.5–1)
GFR SERPL CREATININE-BSD FRML MDRD: 41 ML/MIN/1.73
GLUCOSE BLD-MCNC: 354 MG/DL (ref 74–99)
POTASSIUM SERPL-SCNC: 4.7 MMOL/L (ref 3.5–5)
PRO-BNP: 368 PG/ML (ref 0–125)
SODIUM BLD-SCNC: 133 MMOL/L (ref 132–146)

## 2023-01-18 PROCEDURE — 99204 OFFICE O/P NEW MOD 45 MIN: CPT

## 2023-01-18 PROCEDURE — 83880 ASSAY OF NATRIURETIC PEPTIDE: CPT

## 2023-01-18 PROCEDURE — 80048 BASIC METABOLIC PNL TOTAL CA: CPT

## 2023-01-18 PROCEDURE — 36415 COLL VENOUS BLD VENIPUNCTURE: CPT

## 2023-01-18 NOTE — TELEPHONE ENCOUNTER
----- Message from Delmi Congress, APRN - CNP sent at 1/18/2023 10:20 AM EST -----  Carissa Records - patient at CHF clinic today. Refuses to see CHICO, can you please get her scheduled with Dr. Sara Ballesteros.      Thank you

## 2023-01-18 NOTE — PROGRESS NOTES
Congestive Heart Failure Charbel Madden 12   1955          Referring Provider: DR. Debbie Guzman  Primary Care Physician: DR. JESUS DUFF  Cardiologist: DR. Debbie Guzman  Nephrologist: DR. Alla English        History of Present Illness:     Dylan Muñoz is a 79 y.o. female with a history of HFpEF, most recent EF 60-65% ON 1/6/2023. Patient Story:    She does not  have dyspnea with exertion, shortness of breath, or decline in overall functional capacity. She does not have orthopnea, PND, nocturnal cough or hemoptysis. She does not have abdominal distention or bloating, early satiety, anorexia/change in appetite. She does has a good urinary response to  oral diuretic. She does not have  lower extremity edema. She denies lightheadedness, dizziness. She denies palpitations, syncope or near syncope. She does not complain of chest pain, pressure, discomfort. Allergies   Allergen Reactions    Synvisc [Hylan G-F 20]      Local swelling         No outpatient medications have been marked as taking for the 1/18/23 encounter [facility] Encounter) with [facility] ROOM 1.      Current Outpatient Medications on File Prior to Encounter   Medication Sig Dispense Refill    apixaban (ELIQUIS) 5 MG TABS tablet Take 2 tablets by mouth 2 times daily for 7 days 28 tablet 0    apixaban (ELIQUIS) 5 MG TABS tablet Take 1 tablet by mouth 2 times daily 60 tablet 1    hydrALAZINE (APRESOLINE) 25 MG tablet Take 1 tablet by mouth every 8 hours 90 tablet 3    carvedilol (COREG) 6.25 MG tablet Take 1 tablet by mouth 2 times daily (with meals) 60 tablet 3    verapamil (CALAN SR) 180 MG extended release tablet Take 1.5 tablets by mouth daily 30 tablet 3    LOKELMA 10 g PACK oral suspension Take 10 g by mouth daily      magnesium oxide (MAG-OX) 400 MG tablet Take 400 mg by mouth 2 times daily      methocarbamol (ROBAXIN) 500 MG tablet Take 500 mg by mouth 4 times daily (Patient not taking: Reported on 1/18/2023)      insulin 70-30 (HUMULIN;NOVOLIN) (70-30) 100 UNIT per ML injection vial Inject into the skin 2 times daily      Pediatric Multivitamins-Fl (MULTI VIT/FL PO) Take  by mouth. (Patient not taking: Reported on 11/3/2022)      alprazolam (XANAX) 1 MG tablet   Take 1 mg by mouth 2 times daily Instructed to take am of procedure      furosemide (LASIX) 20 MG tablet Take 20 mg by mouth daily. aspirin 81 MG EC tablet Take 81 mg by mouth daily. No current facility-administered medications on file prior to encounter. Guideline directed medical:  ARNI/ACE I/ARB: No  Beta blocker:   Yes  Aldosterone antagonist:  No  SGLT2i:  No        Physical Examination:     BP (!) 119/56   Pulse 77   Resp 18   Wt 154 lb (69.9 kg)   SpO2 99%   BMI 25.63 kg/m²                    HEENT (Head, Ears, Eyes, Nose, & Throat)  HEENT (WDL): Within Defined Limits    Respiratory  Respiratory Depth: Normal  Respiratory Quality/Effort: Unlabored  Chest Assessment: Chest expansion symmetrical  L Breath Sounds: Clear  R Breath Sounds: Clear              Cardiac  Cardiac Regularity: Regular  Cardiac Rhythm: Sinus rhythm    Rhythm Interpretation  Heart Rate: 77         Gastrointestinal  Abdominal (WDL): Within Defined Limits               Peripheral Vascular  Peripheral Vascular (WDL): Within Defined Limits  Edema: None  RLE Edema: None  LLE Edema: None                   Genitourinary  Genitourinary (WDL): Within Defined Limits    Psychosocial  Psychosocial (WDL): Within Defined Limits                        Heart Rate: 77                     LAB DATA:    Last 3 BMP      Sodium (mmol/L)   Date Value   01/07/2023 139   01/06/2023 141   01/04/2023 138     Potassium (mmol/L)   Date Value   01/07/2023 3.6   01/06/2023 4.0   01/04/2023 4.1     Chloride (mmol/L)   Date Value   01/07/2023 101   01/06/2023 106   01/04/2023 102     CO2 (mmol/L)   Date Value   01/07/2023 29   01/06/2023 28   01/04/2023 27     BUN (mg/dL) Date Value   01/07/2023 23   01/06/2023 17   01/04/2023 16     Glucose (mg/dL)   Date Value   01/07/2023 77   01/06/2023 67 (L)   01/04/2023 293 (H)   06/08/2011 319 (H)   06/08/2011 101   06/01/2011 250 (H)     Calcium (mg/dL)   Date Value   01/07/2023 8.7   01/06/2023 9.2   01/04/2023 9.2       Last 3 BNP       Pro-BNP (pg/mL)   Date Value   01/06/2023 954 (H)   01/04/2023 1,152 (H)          CBC: No results for input(s): WBC, HGB, PLT in the last 72 hours. BMP:  No results for input(s): NA, K, CL, CO2, BUN, CREATININE, GLUCOSE in the last 72 hours. Hepatic: No results for input(s): AST, ALT, ALB, BILITOT, ALKPHOS in the last 72 hours. Troponin: No results for input(s): TROPONINI in the last 72 hours. BNP: No results for input(s): BNP in the last 72 hours. Lipids: No results for input(s): CHOL, HDL in the last 72 hours. Invalid input(s): LDLCALCU  INR: No results for input(s): INR in the last 72 hours.         WEIGHTS:    Wt Readings from Last 3 Encounters:   01/18/23 154 lb (69.9 kg)   01/07/23 152 lb 2 oz (69 kg)   08/08/22 155 lb (70.3 kg)         TELEMETRY:  Cardiac Regularity: Regular  Cardiac Rhythm/Interpretation: NSR        ASSESSMENT:  Conchita Shoulder Sorice is evolemic with stable weights     Interventions completed this visit:  IV diuretics given no  Lab work obtained yes, BNP/BMP   Reviewed currently prescribed medications with patient, educated on importance of compliance and answered any questions regarding their medication  Educated on signs and symptoms of HF  Educated on low sodium diet    PLAN:  Scheduled to follow up in CHF clinic on   Future Appointments   Date Time Provider Florencio Brock   1/31/2023 12:30 PM [facility] CHF ROOM 1 [facility]   2/13/2023  3:40 PM Ori Bangura MD [address] [place] [address]   5/18/2023  2:00 PM [facility] VAS [facility] Jaime   5/18/2023  2:30 PM Meredith Kelley MD [facility]     Given clinic phone number and aware of signs and symptoms to call with any HF change in symptoms. FIRST VISIT TODAY, REVIEWED CARING FOR YOUR HEART, ZONE PAMPHLET, DAILY WEIGHTS, LOW SODIUM DIET, MEDICATIONS. PT AND FRIEND VERBALIZED UNDERSTANDING. Pt denies any shortness of breath, dizziness or lightheadedness. Pt confirms drinks at least 64 ounces per day. Pt admits urine is light pale in color. Information given to pt from dietician regarding low sodium diet and meal plans.

## 2023-01-18 NOTE — PROGRESS NOTES
Labs and flow  sheet faxed to Dr. Mayberry Schooling office, confirmation received. Office notified of fax and elevated blood sugar and to advise pt of any new orders.

## 2023-01-18 NOTE — PLAN OF CARE
Problem: Chronic Conditions and Co-morbidities  Goal: Patient's chronic conditions and co-morbidity symptoms are monitored and maintained or improved  Flowsheets (Taken 1/18/2023 3809)  Care Plan - Patient's Chronic Conditions and Co-Morbidity Symptoms are Monitored and Maintained or Improved: Monitor and assess patient's chronic conditions and comorbid symptoms for stability, deterioration, or improvement

## 2023-01-20 ENCOUNTER — HOSPITAL ENCOUNTER (OUTPATIENT)
Dept: OTHER | Age: 68
Discharge: HOME | End: 2023-01-20

## 2023-01-22 ENCOUNTER — HOSPITAL ENCOUNTER (EMERGENCY)
Age: 68
Discharge: HOME | End: 2023-01-22
Attending: EMERGENCY MEDICINE
Payer: MEDICARE

## 2023-01-22 ENCOUNTER — APPOINTMENT (OUTPATIENT)
Dept: CT IMAGING | Age: 68
End: 2023-01-22
Payer: MEDICARE

## 2023-01-22 VITALS
SYSTOLIC BLOOD PRESSURE: 136 MMHG | DIASTOLIC BLOOD PRESSURE: 62 MMHG | OXYGEN SATURATION: 99 % | HEART RATE: 81 BPM | RESPIRATION RATE: 16 BRPM | TEMPERATURE: 98.4 F

## 2023-01-22 DIAGNOSIS — H11.32 SUBCONJUNCTIVAL HEMORRHAGE OF LEFT EYE: ICD-10-CM

## 2023-01-22 DIAGNOSIS — S05.12XA PERIORBITAL CONTUSION OF LEFT EYE, INITIAL ENCOUNTER: ICD-10-CM

## 2023-01-22 DIAGNOSIS — Z79.01 ANTICOAGULATED BY ANTICOAGULATION TREATMENT: ICD-10-CM

## 2023-01-22 DIAGNOSIS — S09.90XA CLOSED HEAD INJURY, INITIAL ENCOUNTER: Primary | ICD-10-CM

## 2023-01-22 PROCEDURE — 70450 CT HEAD/BRAIN W/O DYE: CPT

## 2023-01-22 PROCEDURE — 70486 CT MAXILLOFACIAL W/O DYE: CPT

## 2023-01-22 PROCEDURE — 72125 CT NECK SPINE W/O DYE: CPT

## 2023-01-22 PROCEDURE — 6370000000 HC RX 637 (ALT 250 FOR IP): Performed by: EMERGENCY MEDICINE

## 2023-01-22 PROCEDURE — 99284 EMERGENCY DEPT VISIT MOD MDM: CPT

## 2023-01-22 RX ORDER — TETRACAINE HYDROCHLORIDE 5 MG/ML
2 SOLUTION OPHTHALMIC ONCE
Status: COMPLETED | OUTPATIENT
Start: 2023-01-22 | End: 2023-01-22

## 2023-01-22 RX ADMIN — TETRACAINE HYDROCHLORIDE 2 DROP: 5 SOLUTION OPHTHALMIC at 16:03

## 2023-01-22 RX ADMIN — FLUORESCEIN SODIUM 1 MG: 1 STRIP OPHTHALMIC at 16:03

## 2023-01-22 ASSESSMENT — PAIN - FUNCTIONAL ASSESSMENT: PAIN_FUNCTIONAL_ASSESSMENT: 0-10

## 2023-01-22 ASSESSMENT — PAIN DESCRIPTION - DESCRIPTORS: DESCRIPTORS: DISCOMFORT;SORE

## 2023-01-22 ASSESSMENT — LIFESTYLE VARIABLES
HOW OFTEN DO YOU HAVE A DRINK CONTAINING ALCOHOL: NEVER
HOW MANY STANDARD DRINKS CONTAINING ALCOHOL DO YOU HAVE ON A TYPICAL DAY: PATIENT DOES NOT DRINK

## 2023-01-22 ASSESSMENT — VISUAL ACUITY
OS: 20/25
OD: 20/25
OU: 20/25

## 2023-01-22 ASSESSMENT — PAIN DESCRIPTION - ONSET: ONSET: ON-GOING

## 2023-01-22 ASSESSMENT — PAIN DESCRIPTION - LOCATION: LOCATION: FACE;EYE

## 2023-01-22 ASSESSMENT — PAIN DESCRIPTION - PAIN TYPE: TYPE: ACUTE PAIN

## 2023-01-22 ASSESSMENT — PAIN DESCRIPTION - FREQUENCY: FREQUENCY: CONTINUOUS

## 2023-01-22 ASSESSMENT — PAIN SCALES - GENERAL: PAINLEVEL_OUTOF10: 8

## 2023-01-22 ASSESSMENT — PAIN DESCRIPTION - ORIENTATION: ORIENTATION: LEFT

## 2023-01-22 NOTE — ED PROVIDER NOTES
[address] ENCOUNTER        Pt Name: Bran Coffman  MRN: [id number]  [place] 1955  Date of evaluation: 1/22/2023  Provider: Rodney Singh DO  PCP: Myra Oro MD  Note Started: 3:01 PM EST 1/22/23    CHIEF COMPLAINT       Chief Complaint   Patient presents with    Eye Injury     Friday her sugar dropped and fell to floor hit left side of face and Left eye, left eye pink and bruising noted around face, on thinners       HISTORY OF PRESENT ILLNESS: 1 or more Elements   History From: patient    Limitations to history : None    Bran Coffman is a 79 y.o. female who presents with left periorbital contusion and mild swelling  beginning last week after closed head injury. Patient states she got up to use the bathroom and slipped in dog urine and slid forward across the floor and hit the left temporal side of her face. Then, two nights ago she states her blood sugar dropped and she fell forward in the left side of her face and left eye and left eyeball took another impact. She states that the sugar issue was fixed because she drank a bunch of honey and ate a bunch of candy bars. She states yesterday morning she woke up with some redness on the medial portion of her eye which is starting to improve but still there. She presents today for evaluation. Patient has a diagnosis of insulin requiring diabetes, hypertension, anxiety, knee disease and recent diagnosis of congestive heart failure just discharged from the hospital last week. The complaint has been persistent, moderate in severity, and worsened by nothing. She advises she does wear glasses but was not wearing them at the time of either fall. She denies blood thinner but medical record lists Eliquis which she states she is taking.   Patient denies fever/chills, sore throat, cough, congestion, chest pain, shortness of breath, edema, headache, visual disturbances, focal paresthesias, focal weakness, abdominal pain, nausea, vomiting, diarrhea, constipation, dysuria, hematuria, neck or back pain, rash or other complaints. Nursing Notes were all reviewed and agreed with or any disagreements were addressed in the HPI. REVIEW OF SYSTEMS :           Positives and Pertinent negatives as per HPI. SURGICAL HISTORY     Past Surgical History:   Procedure Laterality Date    APPENDECTOMY      CHOLECYSTECTOMY      FRACTURE SURGERY Right     arm    KNEE ARTHROPLASTY Right 2011    Right TKA. Lito Menchaca MD       [address]       Discharge Medication List as of 1/22/2023  3:56 PM        CONTINUE these medications which have NOT CHANGED    Details   apixaban (ELIQUIS) 5 MG TABS tablet Take 1 tablet by mouth 2 times daily, Disp-60 tablet, R-1Normal      hydrALAZINE (APRESOLINE) 25 MG tablet Take 1 tablet by mouth every 8 hours, Disp-90 tablet, R-3Normal      carvedilol (COREG) 6.25 MG tablet Take 1 tablet by mouth 2 times daily (with meals), Disp-60 tablet, R-3Normal      verapamil (CALAN SR) 180 MG extended release tablet Take 1.5 tablets by mouth daily, Disp-30 tablet, R-3Normal      LOKELMA 10 g PACK oral suspension Take 10 g by mouth daily, DAWHistorical Med      magnesium oxide (MAG-OX) 400 MG tablet Take 400 mg by mouth 2 times dailyHistorical Med      methocarbamol (ROBAXIN) 500 MG tablet Take 500 mg by mouth 4 times dailyHistorical Med      insulin 70-30 (HUMULIN;NOVOLIN) (70-30) 100 UNIT per ML injection vial Inject into the skin 2 times dailyHistorical Med      Pediatric Multivitamins-Fl (MULTI VIT/FL PO) Take  by mouth. alprazolam (XANAX) 1 MG tablet   Take 1 mg by mouth 2 times daily Instructed to take am of procedure      furosemide (LASIX) 20 MG tablet Take 20 mg by mouth daily. aspirin 81 MG EC tablet Take 81 mg by mouth daily.                ALLERGIES     Synvisc [hylan g-f 20]    FAMILYHISTORY       Family History   Problem Relation Age of Onset    Diabetes Mother     Arthritis Father     Diabetes Father         SOCIAL HISTORY       Social History     Tobacco Use    Smoking status: Some Days    Smokeless tobacco: Never   Vaping Use    Vaping Use: Never used   Substance Use Topics    Alcohol use: Yes     Alcohol/week: 0.0 standard drinks     Comment: occasional     Drug use: No       SCREENINGS        [facility] Coma Scale  Eye Opening: Spontaneous  Best Verbal Response: Oriented  Best Motor Response: Obeys commands  [facility] Coma Scale Score: 15                CIWA Assessment  BP: 136/62  Heart Rate: 81           PHYSICAL EXAM  1 or more Elements     ED Triage Vitals [01/22/23 1345]   BP Temp Temp src Heart Rate Resp SpO2 Height Weight   136/62 98.4 °F (36.9 °C) -- 81 16 99 % -- --            ----------------------------------------PHYSICAL EXAM--------------------------------------  Constitutional:  Well developed, well nourished, no acute distress, non-toxic appearance   Eyes:  PERRL, right conjunctiva normal, left conjunctiva with small 2 separate areas of subconjunctival hemorrhage in the right medial upper and lower globe, EOMI without pain, no hyphema, no hypopyon, no proptosis. No foreign bodies noted. No fluorescein uptake on exam.  HENT: Patient has some mild swelling with periorbital contusion on the left lower eye on her cheek as well as the left temporal area and the left forehead above the eye. No temporal tenderness to palpation. Head is otherwise atraumatic, external ears normal, nose normal, oropharynx moist, no pharyngeal exudates. Patient intact. Neck- normal range of motion, no nuchal rigidity   Respiratory:  No respiratory distress, normal breath sounds, no rales, no wheezing   Cardiovascular:  Normal rate, normal rhythm, no murmurs, no gallops, no rubs. Radial and DP pulses 2+ bilaterally. Compartments are soft. She is warm and well perfused. Cap refill less than 3 seconds.   GI:  Soft, nondistended, normal bowel sounds, nontender, no organomegaly, no mass, no rebound, no guarding   :  No costovertebral angle tenderness   Musculoskeletal:  No edema, no tenderness, no deformities. Back-no midline or paraspinal cervical, thoracic or lumbar tenderness. No step-offs. Chest able. Pelvis stable. Moves all 4 extremities without difficulty or pain. Integument:  Well hydrated, no rash. Adequate perfusion. Lymphatic:  No cervical lymphadenopathy noted   Neurologic:  Alert & oriented x 3, CN 2-12 normal, no focal deficits noted. DTRs 2+ bilateral patellar. Normal gait. Psychiatric:  Speech and behavior appropriate             DIAGNOSTIC RESULTS   LABS:  No results found for this visit on 01/22/23. As interpreted by me, the above displayed labs are abnormal. All other labs obtained during this visit were within normal range or not returned as of this dictation. RADIOLOGY:   Non-plain film images such as CT, Ultrasound and MRI are read by the radiologist. Plain radiographic images are visualized and preliminarily interpreted by the ED Provider with the below findings:    Interpretation per the Radiologist below, if available at the time of this note:    CT HEAD WO CONTRAST   Final Result   No acute intracranial abnormality. Specifically, there is no acute   intracranial hemorrhage         CT FACIAL BONES WO CONTRAST   Final Result   No significant findings. No fracture or suspicious soft tissue injury. No   soft tissue hematoma visualized. Bilateral intact globes. CT CERVICAL SPINE WO CONTRAST   Final Result   1. There is no acute compression fracture or subluxation of the cervical   spine. 2. Multilevel degenerative disc and degenerative joint disease. No results found. No results found.     PROCEDURES   Unless otherwise noted below, none          CRITICAL CARE TIME (.cct)   none    PAST MEDICAL HISTORY/Chronic Conditions Affecting Care      has a past medical history of Anxiety, Bilateral carotid artery stenosis (10/7/2021), Bruit of left carotid artery (7/9/2020), Decreased dorsalis pedis pulse (7/9/2020), Humeral fracture, Hypertension, Kidney disease, Left carotid stenosis (7/9/2020), Leg pain, Osteoarthritis of right knee (5/4/2011), Superficial phlebitis of leg, right (8/12/2021), Type II or unspecified type diabetes mellitus without mention of complication, not stated as uncontrolled, and Varicose veins of leg with pain, right (8/12/2021). EMERGENCY DEPARTMENT COURSE    Vitals:    Vitals:    01/22/23 1345   BP: 136/62   Pulse: 81   Resp: 16   Temp: 98.4 °F (36.9 °C)   SpO2: 99%       Patient was given the following medications:  Medications   tetanus & diphtheria toxoids (adult) 5-2 LFU injection 0.5 mL (0.5 mLs IntraMUSCular Not Given 1/22/23 1456)   tetracaine (TETRAVISC) 0.5 % ophthalmic solution 2 drop (2 drops Left Eye Given 1/22/23 1603)   fluorescein ophthalmic strip 1 mg (1 mg Left Eye Given 1/22/23 1603)           Is this patient to be included in the SEP-1 Core Measure due to severe sepsis or septic shock? No Exclusion criteria - the patient is NOT to be included for SEP-1 Core Measure due to: Infection is not suspected        Medical Decision Making/Differential Diagnosis:    CC/HPI Summary, Social Determinants of health, Records Reviewed, DDx, testing done/not done, ED Course, Reassessment, disposition considerations/shared decision making with patient, consults, disposition:            MDM:   Presents with 2 separate head injuries this past week, 1 related to slipping and dog PE, the other related to a potential hypoglycemic episode. There are no acute traumatic injuries, bleeding, fractures noted on CAT scans today. She does have a subconjunctival hemorrhage of the left eye without hyphema.   She is on Eliquis, she was advised to continue taking Eliquis but to hold her insulin at night if she is concerned for hypoglycemic episodes during the night and will follow-up with her family doctor in 3 days as scheduled with her blood sugar diary. Visual acuity 20/25 bilateral, 20/25 right, 20/25 left. She understands and agrees with the plan. Shared decision making was used today. She appears well, nontoxic, neurovascularly intact and ambulatory upon discharge. Discharge papers were written prior to patient's discharge with insulin recommendations. Patient went over these verbally and states it did not have to be written in her discharge record. Patient advises she is takes insulin twice a day and monitors her blood sugar twice a day as well as tight control of her carb intake. Differential diagnosis includes: Head bleed, facial fracture, globe rupture, concussion, cervical fracture, dental trauma      Re-Evaluations:   Re-evaluation. Patients symptoms are improving  Repeat physical examination is not changed      CONSULTS: (Who and What was discussed)  None    Counseling: The emergency provider has spoken with the patient and discussed todays results, in addition to providing specific details for the plan of care and counseling regarding the diagnosis and prognosis. Questions are answered at this time and they are agreeable with the plan. I am the Primary Clinician of Record.     --------------------------------- IMPRESSION AND DISPOSITION ---------------------------------    IMPRESSION  1. Closed head injury, initial encounter    2. Anticoagulated by anticoagulation treatment    3. Subconjunctival hemorrhage of left eye    4.  Periorbital contusion of left eye, initial encounter        DISPOSITION  Disposition: Discharge to home  Patient condition is stable    PATIENT REFERRED TO:  Milan Shanks, [facility] [place] [address] [phone]    Call today      [address] [facility]  [place] [address] [address]  [phone]  Go to   If symptoms worsen    DISCHARGE MEDICATIONS:  Discharge Medication List as of 1/22/2023  3:56 PM          DISCONTINUED MEDICATIONS:  Discharge Medication List as of 1/22/2023  3:56 PM                 (Please note that portions of this note were completed with a voice recognition program.  Efforts were made to edit the dictations but occasionally words are mis-transcribed.)    Archana Manley DO (electronically signed)           Archana Manley DO  01/22/23 Allen 19,   01/22/23 162

## 2023-01-29 ENCOUNTER — HOSPITAL ENCOUNTER (EMERGENCY)
Age: 68
Discharge: HOME | End: 2023-01-30
Attending: EMERGENCY MEDICINE
Payer: MEDICARE

## 2023-01-29 ENCOUNTER — APPOINTMENT (OUTPATIENT)
Dept: GENERAL RADIOLOGY | Age: 68
End: 2023-01-29
Payer: MEDICARE

## 2023-01-29 DIAGNOSIS — D64.9 CHRONIC ANEMIA: ICD-10-CM

## 2023-01-29 DIAGNOSIS — E16.2 HYPOGLYCEMIA: ICD-10-CM

## 2023-01-29 DIAGNOSIS — M79.89 LEG SWELLING: Primary | ICD-10-CM

## 2023-01-29 PROCEDURE — 83880 ASSAY OF NATRIURETIC PEPTIDE: CPT

## 2023-01-29 PROCEDURE — 93005 ELECTROCARDIOGRAM TRACING: CPT | Performed by: EMERGENCY MEDICINE

## 2023-01-29 PROCEDURE — 96374 THER/PROPH/DIAG INJ IV PUSH: CPT

## 2023-01-29 PROCEDURE — 80053 COMPREHEN METABOLIC PANEL: CPT

## 2023-01-29 PROCEDURE — 99285 EMERGENCY DEPT VISIT HI MDM: CPT

## 2023-01-29 PROCEDURE — 84484 ASSAY OF TROPONIN QUANT: CPT

## 2023-01-29 PROCEDURE — 85025 COMPLETE CBC W/AUTO DIFF WBC: CPT

## 2023-01-29 PROCEDURE — 71045 X-RAY EXAM CHEST 1 VIEW: CPT

## 2023-01-29 ASSESSMENT — PAIN SCALES - GENERAL: PAINLEVEL_OUTOF10: 3

## 2023-01-29 ASSESSMENT — PAIN - FUNCTIONAL ASSESSMENT: PAIN_FUNCTIONAL_ASSESSMENT: 0-10

## 2023-01-30 ENCOUNTER — TELEPHONE (OUTPATIENT)
Dept: OTHER | Facility: CLINIC | Age: 68
End: 2023-01-30

## 2023-01-30 VITALS
WEIGHT: 151 LBS | HEART RATE: 65 BPM | DIASTOLIC BLOOD PRESSURE: 55 MMHG | OXYGEN SATURATION: 99 % | RESPIRATION RATE: 10 BRPM | BODY MASS INDEX: 25.16 KG/M2 | TEMPERATURE: 97.8 F | SYSTOLIC BLOOD PRESSURE: 132 MMHG | HEIGHT: 65 IN

## 2023-01-30 LAB
ALBUMIN SERPL-MCNC: 3.8 G/DL (ref 3.5–5.2)
ALP BLD-CCNC: 98 U/L (ref 35–104)
ALT SERPL-CCNC: 18 U/L (ref 0–32)
ANION GAP SERPL CALCULATED.3IONS-SCNC: 13 MMOL/L (ref 7–16)
AST SERPL-CCNC: 25 U/L (ref 0–31)
BASOPHILS ABSOLUTE: 0.06 E9/L (ref 0–0.2)
BASOPHILS RELATIVE PERCENT: 0.9 % (ref 0–2)
BILIRUB SERPL-MCNC: 0.4 MG/DL (ref 0–1.2)
BUN BLDV-MCNC: 33 MG/DL (ref 6–23)
CALCIUM SERPL-MCNC: 8.8 MG/DL (ref 8.6–10.2)
CHLORIDE BLD-SCNC: 97 MMOL/L (ref 98–107)
CO2: 23 MMOL/L (ref 22–29)
CREAT SERPL-MCNC: 1.3 MG/DL (ref 0.5–1)
EKG ATRIAL RATE: 68 BPM
EKG P AXIS: 68 DEGREES
EKG P-R INTERVAL: 208 MS
EKG Q-T INTERVAL: 418 MS
EKG QRS DURATION: 88 MS
EKG QTC CALCULATION (BAZETT): 444 MS
EKG R AXIS: 27 DEGREES
EKG T AXIS: 71 DEGREES
EKG VENTRICULAR RATE: 68 BPM
EOSINOPHILS ABSOLUTE: 0.39 E9/L (ref 0.05–0.5)
EOSINOPHILS RELATIVE PERCENT: 5.5 % (ref 0–6)
GFR SERPL CREATININE-BSD FRML MDRD: 45 ML/MIN/1.73
GLUCOSE BLD-MCNC: 49 MG/DL (ref 74–99)
GLUCOSE BLD-MCNC: 64 MG/DL (ref 74–99)
HCT VFR BLD CALC: 28.4 % (ref 34–48)
HEMOGLOBIN: 9.4 G/DL (ref 11.5–15.5)
IMMATURE GRANULOCYTES #: 0.02 E9/L
IMMATURE GRANULOCYTES %: 0.3 % (ref 0–5)
LYMPHOCYTES ABSOLUTE: 1.8 E9/L (ref 1.5–4)
LYMPHOCYTES RELATIVE PERCENT: 25.6 % (ref 20–42)
MCH RBC QN AUTO: 29.8 PG (ref 26–35)
MCHC RBC AUTO-ENTMCNC: 33.1 % (ref 32–34.5)
MCV RBC AUTO: 90.2 FL (ref 80–99.9)
METER GLUCOSE: 129 MG/DL (ref 74–99)
MONOCYTES ABSOLUTE: 0.62 E9/L (ref 0.1–0.95)
MONOCYTES RELATIVE PERCENT: 8.8 % (ref 2–12)
NEUTROPHILS ABSOLUTE: 4.15 E9/L (ref 1.8–7.3)
NEUTROPHILS RELATIVE PERCENT: 58.9 % (ref 43–80)
PDW BLD-RTO: 13.6 FL (ref 11.5–15)
PLATELET # BLD: 286 E9/L (ref 130–450)
PMV BLD AUTO: 10.3 FL (ref 7–12)
POTASSIUM SERPL-SCNC: 4.1 MMOL/L (ref 3.5–5)
PRO-BNP: 285 PG/ML (ref 0–125)
RBC # BLD: 3.15 E12/L (ref 3.5–5.5)
SODIUM BLD-SCNC: 133 MMOL/L (ref 132–146)
TOTAL PROTEIN: 6.4 G/DL (ref 6.4–8.3)
TROPONIN, HIGH SENSITIVITY: 30 NG/L (ref 0–9)
TROPONIN, HIGH SENSITIVITY: 32 NG/L (ref 0–9)
WBC # BLD: 7 E9/L (ref 4.5–11.5)

## 2023-01-30 PROCEDURE — 6360000002 HC RX W HCPCS: Performed by: STUDENT IN AN ORGANIZED HEALTH CARE EDUCATION/TRAINING PROGRAM

## 2023-01-30 PROCEDURE — 93010 ELECTROCARDIOGRAM REPORT: CPT | Performed by: INTERNAL MEDICINE

## 2023-01-30 PROCEDURE — 36415 COLL VENOUS BLD VENIPUNCTURE: CPT

## 2023-01-30 PROCEDURE — 84484 ASSAY OF TROPONIN QUANT: CPT

## 2023-01-30 PROCEDURE — 82962 GLUCOSE BLOOD TEST: CPT

## 2023-01-30 PROCEDURE — 96374 THER/PROPH/DIAG INJ IV PUSH: CPT

## 2023-01-30 PROCEDURE — 82947 ASSAY GLUCOSE BLOOD QUANT: CPT

## 2023-01-30 RX ORDER — FUROSEMIDE 10 MG/ML
20 INJECTION INTRAMUSCULAR; INTRAVENOUS ONCE
Status: COMPLETED | OUTPATIENT
Start: 2023-01-30 | End: 2023-01-30

## 2023-01-30 RX ADMIN — FUROSEMIDE 20 MG: 10 INJECTION, SOLUTION INTRAMUSCULAR; INTRAVENOUS at 01:56

## 2023-01-30 NOTE — ED NOTES
Pt was provided with apple juice and two puddings for BGL. Pt denying other food at this time.      Rose Pace RN  01/30/23 9019

## 2023-01-30 NOTE — ED PROVIDER NOTES
[facility] ENCOUNTER        Pt Name: Sandy Obrien  MRN: [id number]  [place] 1955  Date of evaluation: 1/29/2023  Provider: Ivan Puri DO  PCP: Kristine Montano MD  Note Started: 11:50 PM EST 1/29/23    CHIEF COMPLAINT       Chief Complaint   Patient presents with    Leg Swelling     Bilateral swelling to patients ankles, calves and knees. Patient states her symptoms started a few hours ago and she was recently diagnosed with CHF. Patient denies shortness of breath or chest pain        HISTORY OF PRESENT ILLNESS: 1 or more Elements   History From: Patient    Limitations to history : None    Sandy Obrien is a 79 y.o. female with past medical history of diabetes, CHF, hypertension, CKD, DVT on Eliquis and varicose veins who presents to the emergency department due to worsening leg swelling. Patient states that her symptoms started 3 hours prior to arrival to the ED. She denies any inciting factors for symptoms. Patient states that about 2 weeks ago, she presented to the ED with similar symptoms and was diagnosed with congestive heart failure and a DVT. Patient has been on Eliquis for the DVT and has no missed doses. She is also noting that she is on furosemide chronically as well. She was seen at the CHF clinic on Tuesday and states that everything was normal with her visit then. No changes in meds or additional intervention needed. Tonight, patient was stating that her legs got much more swollen so she came into the ED for assessment. She is feeling tightness in her legs because of the swelling and feels very uncomfortable because of this. Status is unchanged. Patient is denying any other symptoms including nausea, vomiting, fever, chills, chest pain, shortness of breath, diaphoresis, cough, congestion, sore throat, urinary symptoms, constipation, diarrhea, numbness, tingling or abdominal pain.   On initial assessment, patient is well-appearing in no acute distress. No signs of acute respiratory distress noted. No recent sick contacts or illnesses noted. Nursing Notes were all reviewed and agreed with or any disagreements were addressed in the HPI.    ROS:   Pertinent positives and negatives are stated within HPI, all other systems reviewed and are negative.    --------------------------------------------- PAST HISTORY ---------------------------------------------  Past Medical History:  has a past medical history of Anxiety, Bilateral carotid artery stenosis, Bruit of left carotid artery, Decreased dorsalis pedis pulse, Humeral fracture, Hypertension, Kidney disease, Left carotid stenosis, Leg pain, Osteoarthritis of right knee, Superficial phlebitis of leg, right, Type II or unspecified type diabetes mellitus without mention of complication, not stated as uncontrolled, and Varicose veins of leg with pain, right. Past Surgical History:  has a past surgical history that includes Cholecystectomy; Appendectomy; Knee Arthroplasty (Right, 2011); and fracture surgery (Right). Social History:  reports that she has been smoking. She has never used smokeless tobacco. She reports current alcohol use. She reports that she does not use drugs. Family History: family history includes Arthritis in her father; Diabetes in her father and mother. The patients home medications have been reviewed. Allergies: Synvisc [muriel g-f 20]    ---------------------------------------------------PHYSICAL EXAM--------------------------------------    Constitutional/General: Alert and oriented x3, well appearing, non toxic in NAD  Head: Normocephalic and atraumatic  Mouth: Oropharynx clear, handling secretions, no trismus  Neck: Supple, full ROM,  Pulmonary: Lungs clear to auscultation bilaterally, no wheezes, rales, or rhonchi. Not in respiratory distress  Cardiovascular:  Regular rate. Regular rhythm.  No murmurs  Chest: no chest wall tenderness  Abdomen: Soft. Non tender. Non distended. No rebound, guarding, or rigidity. No pulsatile masses appreciated. Musculoskeletal: Moves all extremities x 4. Warm and well perfused, no clubbing or cyanosis. Capillary refill <3 seconds. Bilateral lower extremity pitting edema, worse on the right compared to left. 2+ pitting edema on the left with 1+ pitting edema on the right. Compartments of the lower extremities are soft and compressible. Bilateral lower extremities neurovascular intact with good DP/TP pulses palpated. No sensory deficits noted. Skin: warm and dry. No rashes. No overlying skin changes suggesting cellulitis. Patient has no erythema, warmth or crepitus on palpation of the extremities. Neurologic: GCS 15, no gross focal neurologic deficits  Psych: Normal Affect    -------------------------------------------------- RESULTS -------------------------------------------------  I have personally reviewed all laboratory and imaging results for this patient. Results are listed below.      LABS:  Results for orders placed or performed during the hospital encounter of 01/29/23   CBC with Auto Differential   Result Value Ref Range    WBC 7.0 4.5 - 11.5 E9/L    RBC 3.15 (L) 3.50 - 5.50 E12/L    Hemoglobin 9.4 (L) 11.5 - 15.5 g/dL    Hematocrit 28.4 (L) 34.0 - 48.0 %    MCV 90.2 80.0 - 99.9 fL    MCH 29.8 26.0 - 35.0 pg    MCHC 33.1 32.0 - 34.5 %    RDW 13.6 11.5 - 15.0 fL    Platelets 854 810 - 189 E9/L    MPV 10.3 7.0 - 12.0 fL    Neutrophils % 58.9 43.0 - 80.0 %    Immature Granulocytes % 0.3 0.0 - 5.0 %    Lymphocytes % 25.6 20.0 - 42.0 %    Monocytes % 8.8 2.0 - 12.0 %    Eosinophils % 5.5 0.0 - 6.0 %    Basophils % 0.9 0.0 - 2.0 %    Neutrophils Absolute 4.15 1.80 - 7.30 E9/L    Immature Granulocytes # 0.02 E9/L    Lymphocytes Absolute 1.80 1.50 - 4.00 E9/L    Monocytes Absolute 0.62 0.10 - 0.95 E9/L    Eosinophils Absolute 0.39 0.05 - 0.50 E9/L    Basophils Absolute 0.06 0.00 - 0.20 E9/L   Comprehensive Metabolic Panel   Result Value Ref Range    Sodium 133 132 - 146 mmol/L    Potassium 4.1 3.5 - 5.0 mmol/L    Chloride 97 (L) 98 - 107 mmol/L    CO2 23 22 - 29 mmol/L    Anion Gap 13 7 - 16 mmol/L    Glucose 49 (L) 74 - 99 mg/dL    BUN 33 (H) 6 - 23 mg/dL    Creatinine 1.3 (H) 0.5 - 1.0 mg/dL    Est, Glom Filt Rate 45 >=60 mL/min/1.73    Calcium 8.8 8.6 - 10.2 mg/dL    Total Protein 6.4 6.4 - 8.3 g/dL    Albumin 3.8 3.5 - 5.2 g/dL    Total Bilirubin 0.4 0.0 - 1.2 mg/dL    Alkaline Phosphatase 98 35 - 104 U/L    ALT 18 0 - 32 U/L    AST 25 0 - 31 U/L   Troponin   Result Value Ref Range    Troponin, High Sensitivity 32 (H) 0 - 9 ng/L   Brain Natriuretic Peptide   Result Value Ref Range    Pro- (H) 0 - 125 pg/mL   Troponin   Result Value Ref Range    Troponin, High Sensitivity 30 (H) 0 - 9 ng/L   Glucose, Random   Result Value Ref Range    Glucose 64 (L) 74 - 99 mg/dL   EKG 12 Lead   Result Value Ref Range    Ventricular Rate 68 BPM    Atrial Rate 68 BPM    P-R Interval 208 ms    QRS Duration 88 ms    Q-T Interval 418 ms    QTc Calculation (Bazett) 444 ms    P Axis 68 degrees    R Axis 27 degrees    T Axis 71 degrees       RADIOLOGY:   Interpretation per the Radiologist below, if available at the time of this note:    XR CHEST PORTABLE   Final Result   No acute process. No results found. No results found. See preliminary interpretation by me below. EKG: This EKG is signed and interpreted by me. Normal sinus rhythm with ventricular rate of 68 bpm.  CA interval slightly prolonged at 208 MS. QTc not prolonged. No evidence of acute ST elevation MI. No significant changes compared to previous EKG on 1/6/2023.    ------------------------- NURSING NOTES AND VITALS REVIEWED ---------------------------   The nursing notes within the ED encounter and vital signs as below have been reviewed by myself.   BP (!) 132/55   Pulse 65   Temp 97.8 °F (36.6 °C) (Oral)   Resp 10   Ht 5' 5\" (1.651 m)   Wt 151 lb (68.5 kg)   SpO2 99%   BMI 25.13 kg/m²   Oxygen Saturation Interpretation: Normal    The patients available past medical records and past encounters were reviewed. ------------------------------ ED COURSE/MEDICAL DECISION MAKING----------------------  Medications   furosemide (LASIX) injection 20 mg (20 mg IntraVENous Given 1/30/23 0156)       Medical Decision Making/Differential Diagnosis:    CC/HPI Summary, Pertinent Physical Exam Findings, Social Determinants of health, Records Reviewed, DDx, testing done/not done, ED Course, Reassessment, disposition considerations/shared decision making with patient, consults, disposition:        Medical Decision Making:   I, Dr. Dereck Neri am the resident physician of record. History From: Patient    Limitations to history : None     Chantel Edwards is a 79 y.o. female who presents to the ED for bilateral leg swelling  Vital signs upon arrival BP (!) 132/55   Pulse 65   Temp 97.8 °F (36.6 °C) (Oral)   Resp 10   Ht 5' 5\" (1.651 m)   Wt 151 lb (68.5 kg)   SpO2 99%   BMI 25.13 kg/m²     On initial evaluation, patient is nontoxic-appearing, afebrile, hemodynamically stable and in no acute distress. No signs of acute respiratory distress noted. Working diagnoses include but not limited to CHF, cellulitis, DVT, dependent edema, lymphedema, venous stasis, compartment syndrome, dermatitis or traumatic injury. Physical exam remarkable for lower extremity swelling and edema, worse on the right compared to left. Patient had 2+ pitting edema of the left lower extremity 1+ pitting edema of the right lower extremity. Varicose veins noted. No tenderness to palpation of the lower extremities. Compartments in the lower extremities were soft and compressible. Bilateral lower extremities neurovascular intact. Lungs are clear to auscultation with no wheezes, rales, rhonchi or crackles. No other concerning physical exam findings. Work-up in the emergency department generally unremarkable. Lab work-up as interpreted by me include CBC with no leukocytosis or left shift. WBC 7.1. Hemoglobin low at 9.4 but stable and at patient's baseline between 9 and 10. Patient denying any active bleeding including hematochezia, melena, hemoptysis, epistaxis or hematuria. CMP remarkable for hyperglycemia with a glucose of 49. Otherwise stable renal function, creatinine 1.3/BUN 33. Baseline creatinine 1.4. No major electrolyte abnormalities including normal potassium of 4.1 and sodium of 133. LFTs within normal limits. Patient reevaluated and awake, alert and asymptomatic. She was provided with apple juice and pudding for BGL. Cardiac evaluation was unremarkable. Initial troponin 32 with a repeat of 30, delta 2. Previous troponins elevated in the mid 20s on review. Patient denying any active chest pain in the ED. Elevated troponin likely type II in the setting of CKD.  and appears improved compared to previous levels. Chest x-ray unremarkable with no evidence of acute cardiopulmonary process. EKG sinus and nonspecific with no acute ischemic changes. Patient did remain hemodynamically stable in the ED. She was given 1 dose of IV Lasix 20 mg in the ED. Legs were also elevated for comfort. Repeat POCT glucose after juice and pudding was 64. Patient offered additional food. Will be monitored until BGL improves. Repeat . Plan is for discharge with outpatient follow-up as needed. Work-up and results were discussed with the patient and after shared decision making, she was agreeable with this plan. Patient was given strict return precautions in case of new or worsening symptoms including but not limited to worsening leg swelling, shortness of breath, orthopnea or confusion. Patient discharged in stable condition.       Non-plain film images such as CT, Ultrasound and MRI are read by the radiologist. Plain radiographic images are visualized and preliminarily interpreted by the ED Provider with the below findings:    Chest x-ray with no obvious focal consolidation suggestive of pneumonia, large pleural effusions or pneumothorax. Normal cardiac silhouette. Discussion with Other Profesionals : None    Social Determinants : None    Records Reviewed : Other echocardiogram from 1/5/2023 was evaluated. Patient has a ejection fraction of 60 to 65%. No regional wall motion abnormalities seen. Determinant diastolic function. Chronic conditions: diabetes, CHF, hypertension, CKD, DVT on Eliquis and varicose veins     CONSULTS: None      Disposition:   Appropriate for outpatient management      Pt will be d/c and will follow up with her PCP . She is educated on signs and symptoms that require emergent evaluation. Pt is advised to return to the ED if her symptoms change or worsen. If her pain persists, pt may need further evaluation. Pt is agreeable to plan and all questions have been answered at this time. 1. Leg swelling    2. Hypoglycemia    3. Chronic anemia          Re-Evaluations/Consultations:             ED Course as of 01/30/23 0349   Sun Jan 29, 2023   2355 EKG: This EKG is signed and interpreted by me. Normal sinus rhythm with ventricular rate of 68 bpm.  ND interval slightly prolonged at 208 MS. QTc not prolonged. No evidence of acute ST elevation MI. No significant changes compared to previous EKG on 1/6/2023. [PP]   Mon Jan 30, 2023   0056 Glucose noted to be 49. Patient is awake alert and oriented with no acute complaints. Patient states that she will be able to eat a meal.  We will recheck POCT glucose afterwards. Patient is also agreeable to 1 dose of furosemide to help with her lower extremity swelling. [PP]   0319 Repeat POCT glucose 64 despite some pudding and juice in the ED. Patient will be given additional food and POCT glucose will be reevaluated.  [PP]   5320 Point-of-care glucose 129 after meal in the ED. Patient remained hemodynamically stable and asymptomatic. [PP]      ED Course User Index  [PP] John Ser, DO         This patient's ED course included: History, physical examination, reevaluation prior to disposition    This patient has remained hemodynamically stable during their ED course. Counseling: The emergency provider has spoken with the patient and discussed todays results, in addition to providing specific details for the plan of care and counseling regarding the diagnosis and prognosis. Questions are answered at this time and they are agreeable with the plan.       --------------------------------- IMPRESSION AND DISPOSITION ---------------------------------    IMPRESSION  1. Leg swelling    2. Hypoglycemia    3. Chronic anemia        DISPOSITION  Disposition: Discharge to home  Patient condition is stable        NOTE: This report was transcribed using voice recognition software. Every effort was made to ensure accuracy; however, inadvertent computerized transcription errors may be present         John Ser DO  Resident  01/30/23 3853  ATTENDING PROVIDER ATTESTATION:     I have personally performed and/or participated in the history, exam, medical decision making, and procedures and agree with all pertinent clinical information. I have also reviewed and agree with the past medical, family and social history unless otherwise noted. I have discussed this patient in detail with the resident, and provided the instruction and education regarding edema. My findings/Plan: I was the primary provider for patient. Patient with history of edema and history of CHF and DVT as well as history of diabetes and anemia presenting here because of pain and swelling to her lower extremities she reports a tightness sensation to her lower extremities. Patient reporting no shortness of breath no chest pain or abdominal pain. Patient reporting no fever chills or productive cough.   Patient reporting no rash. Patient arrival is awake alert heart lung exam normal abdomen soft her lower extremities there is some mild edema to her lower extremities. Patient pulses are intact compartments are soft. Patient neurologically intact. Patient differential includes CHF as well as recurrent DVT as well as noncardiogenic edema. Patient's old records were reviewed from early January. She had echo as well as EKG as well as cardiac evaluation at that time. There is some mild diastolic dysfunction on echo. Patient underwent labs specifically CBC and electrolytes and BNP which was within normal limits EKG sinus rhythm with no acute ST elevation I do agree with interpretation by the resident. Patient also had chest x-ray which showed no heart failure or edema. Patient blood sugar was low at 49. Patient though is awake alert oriented. Patient was given food here to eat. Recheck blood sugar was 64 patient was given drink as well as food to eat here again and blood sugar did go above 100. Patient having no symptoms of hypoglycemia. Patient does take hypoglycemic agent. Patient made aware of results and patient was reassessed. She was given Lasix 20 mg IV. Patient made aware of plan and vital signs here are stable. Patient reporting no chest pain or difficulty breathing.         Elma Adams MD  01/30/23 0640

## 2023-01-30 NOTE — TELEPHONE ENCOUNTER
Writer contacted ED provider, to inform of 30-day readmission risk via phone . No Decision on disposition at this time. Callback: If you need to callback to inform of disposition, please call [phone].

## 2023-01-30 NOTE — FLOWSHEET NOTE
Bilateral swelling to patients ankles, calves and knees. Patient states her symptoms started a few hours ago and she was recently diagnosed with CHF.  Patient denies shortness of breath or chest pain

## 2023-01-30 NOTE — ED NOTES
Pt was provided with available ED nourishments due to glucose of 64.      Brandan Pepe RN  01/30/23 5655

## 2023-01-31 ENCOUNTER — HOSPITAL ENCOUNTER (OUTPATIENT)
Dept: OTHER | Age: 68
Setting detail: THERAPIES SERIES
Discharge: HOME | End: 2023-01-31
Payer: MEDICARE

## 2023-01-31 VITALS
BODY MASS INDEX: 25.29 KG/M2 | HEART RATE: 77 BPM | DIASTOLIC BLOOD PRESSURE: 62 MMHG | OXYGEN SATURATION: 97 % | RESPIRATION RATE: 18 BRPM | WEIGHT: 152 LBS | SYSTOLIC BLOOD PRESSURE: 147 MMHG

## 2023-01-31 LAB
ANION GAP SERPL CALCULATED.3IONS-SCNC: 12 MMOL/L (ref 7–16)
BUN BLDV-MCNC: 32 MG/DL (ref 6–23)
CALCIUM SERPL-MCNC: 8.9 MG/DL (ref 8.6–10.2)
CHLORIDE BLD-SCNC: 96 MMOL/L (ref 98–107)
CO2: 26 MMOL/L (ref 22–29)
CREAT SERPL-MCNC: 1.2 MG/DL (ref 0.5–1)
GFR SERPL CREATININE-BSD FRML MDRD: 49 ML/MIN/1.73
GLUCOSE BLD-MCNC: 383 MG/DL (ref 74–99)
POTASSIUM SERPL-SCNC: 4.7 MMOL/L (ref 3.5–5)
PRO-BNP: 341 PG/ML (ref 0–125)
SODIUM BLD-SCNC: 134 MMOL/L (ref 132–146)

## 2023-01-31 PROCEDURE — 99214 OFFICE O/P EST MOD 30 MIN: CPT

## 2023-01-31 PROCEDURE — 80048 BASIC METABOLIC PNL TOTAL CA: CPT

## 2023-01-31 PROCEDURE — 36415 COLL VENOUS BLD VENIPUNCTURE: CPT

## 2023-01-31 PROCEDURE — 83880 ASSAY OF NATRIURETIC PEPTIDE: CPT

## 2023-01-31 RX ORDER — INSULIN DETEMIR 100 [IU]/ML
20 INJECTION, SOLUTION SUBCUTANEOUS 2 TIMES DAILY
COMMUNITY

## 2023-01-31 NOTE — PLAN OF CARE
Problem: Chronic Conditions and Co-morbidities  Goal: Patient's chronic conditions and co-morbidity symptoms are monitored and maintained or improved  Flowsheets (Taken 1/31/2023 3501)  Care Plan - Patient's Chronic Conditions and Co-Morbidity Symptoms are Monitored and Maintained or Improved: Monitor and assess patient's chronic conditions and comorbid symptoms for stability, deterioration, or improvement

## 2023-01-31 NOTE — PROGRESS NOTES
Congestive Heart Failure Charbel Madden 12   1955          Referring Provider: DR. Courtney Garcia  Primary Care Physician: DR. JESUS DUFF  Cardiologist: DR. Courtney Garcia  Nephrologist: DR. Gino Zhu        History of Present Illness:     Franklin Atkins is a 79 y.o. female with a history of HFpEF, most recent EF 60-65% ON 1/6/2023. Patient Story:    She does not  have dyspnea with exertion, shortness of breath, or decline in overall functional capacity. She does not have orthopnea, PND, nocturnal cough or hemoptysis. She does not have abdominal distention or bloating, early satiety, anorexia/change in appetite. She does has a good urinary response to  oral diuretic. She does not have  lower extremity edema. She denies lightheadedness, dizziness. She denies palpitations, syncope or near syncope. She does not complain of chest pain, pressure, discomfort.          Allergies   Allergen Reactions    Synvisc [Hylan G-F 20]      Local swelling         Outpatient Medications Marked as Taking for the 1/31/23 encounter [facility] HOSPITAL Encounter) with [facility] ROOM 1   Medication Sig Dispense Refill    insulin detemir (LEVEMIR) 100 UNIT/ML injection vial Inject 20 Units into the skin 2 times daily      Multiple Vitamins-Minerals (CENTRUM SILVER 50+WOMEN) TABS Take by mouth       Current Outpatient Medications on File Prior to Encounter   Medication Sig Dispense Refill    insulin detemir (LEVEMIR) 100 UNIT/ML injection vial Inject 20 Units into the skin 2 times daily      Multiple Vitamins-Minerals (CENTRUM SILVER 50+WOMEN) TABS Take by mouth      apixaban (ELIQUIS) 5 MG TABS tablet Take 1 tablet by mouth 2 times daily 60 tablet 1    hydrALAZINE (APRESOLINE) 25 MG tablet Take 1 tablet by mouth every 8 hours (Patient taking differently: Take 25 mg by mouth every 8 hours Usually taken 2 x day) 90 tablet 3    carvedilol (COREG) 6.25 MG tablet Take 1 tablet by mouth 2 times daily (with meals) 60 tablet 3    verapamil (CALAN SR) 180 MG extended release tablet Take 1.5 tablets by mouth daily 30 tablet 3    LOKELMA 10 g PACK oral suspension Take 10 g by mouth daily      magnesium oxide (MAG-OX) 400 MG tablet Take 400 mg by mouth 2 times daily      methocarbamol (ROBAXIN) 500 MG tablet Take 500 mg by mouth at bedtime      insulin 70-30 (HUMULIN;NOVOLIN) (70-30) 100 UNIT per ML injection vial Inject into the skin 2 times daily (Patient not taking: Reported on 1/31/2023)      alprazolam (XANAX) 1 MG tablet   Take 1 mg by mouth 2 times daily Instructed to take am of procedure      furosemide (LASIX) 20 MG tablet Take 20 mg by mouth daily. aspirin 81 MG EC tablet Take 81 mg by mouth daily. No current facility-administered medications on file prior to encounter. Guideline directed medical:  ARNI/ACE I/ARB: No  Beta blocker:   Yes  Aldosterone antagonist:  No  SGLT2i:  No        Physical Examination:     BP (!) 147/62   Pulse 77   Resp 18   Wt 152 lb (68.9 kg)   SpO2 97%   BMI 25.29 kg/m²     Assessment  Charting Type: Shift assessment              HEENT (Head, Ears, Eyes, Nose, & Throat)  HEENT (WDL): Within Defined Limits    Respiratory  Respiratory Depth: Normal  Respiratory Quality/Effort: Unlabored  Chest Assessment: Chest expansion symmetrical  L Breath Sounds: Clear  R Breath Sounds: Clear              Cardiac  Cardiac Regularity: Regular  Cardiac Rhythm: Sinus rhythm    Rhythm Interpretation  Heart Rate: 77         Gastrointestinal  Abdominal (WDL): Within Defined Limits               Peripheral Vascular  Peripheral Vascular (WDL): Within Defined Limits  Edema: None  RLE Edema: None  LLE Edema: None                   Genitourinary  Genitourinary (WDL): Within Defined Limits    Psychosocial  Psychosocial (WDL): Exceptions to WDL  Patient Behaviors: Anxious                        Heart Rate: 77                     LAB DATA:    Last 3 BMP      Sodium (mmol/L)   Date Value   01/29/2023 133   01/18/2023 133   01/07/2023 139     Potassium (mmol/L)   Date Value   01/29/2023 4.1   01/18/2023 4.7   01/07/2023 3.6     Chloride (mmol/L)   Date Value   01/29/2023 97 (L)   01/18/2023 96 (L)   01/07/2023 101     CO2 (mmol/L)   Date Value   01/29/2023 23   01/18/2023 26   01/07/2023 29     BUN (mg/dL)   Date Value   01/29/2023 33 (H)   01/18/2023 29 (H)   01/07/2023 23     Glucose (mg/dL)   Date Value   01/30/2023 64 (L)   01/29/2023 49 (L)   01/18/2023 354 (H)   06/08/2011 319 (H)   06/08/2011 101   06/01/2011 250 (H)     Calcium (mg/dL)   Date Value   01/29/2023 8.8   01/18/2023 9.4   01/07/2023 8.7       Last 3 BNP       Pro-BNP (pg/mL)   Date Value   01/29/2023 285 (H)   01/18/2023 368 (H)   01/06/2023 954 (H)          CBC:   Recent Labs     01/29/23 2349   WBC 7.0   HGB 9.4*        BMP:    Recent Labs     01/29/23  2349 01/30/23  0142     --    K 4.1  --    CL 97*  --    CO2 23  --    BUN 33*  --    CREATININE 1.3*  --    GLUCOSE 49* 64*     Hepatic:   Recent Labs     01/29/23 2349   AST 25   ALT 18   BILITOT 0.4   ALKPHOS 98     Troponin: No results for input(s): TROPONINI in the last 72 hours.  BNP: No results for input(s): BNP in the last 72 hours.  Lipids: No results for input(s): CHOL, HDL in the last 72 hours.    Invalid input(s): LDLCALCU  INR: No results for input(s): INR in the last 72 hours.        WEIGHTS:    Wt Readings from Last 3 Encounters:   01/31/23 152 lb (68.9 kg)   01/29/23 151 lb (68.5 kg)   01/18/23 154 lb (69.9 kg)         TELEMETRY:  Cardiac Regularity: Regular  Cardiac Rhythm/Interpretation: NSR        ASSESSMENT:  Sandhya Adame is evolemic with stable weights     Interventions completed this visit:  IV diuretics given no  Lab work obtained yes, BMP/BNP  Reviewed currently prescribed medications with patient, educated on importance of compliance and answered any questions regarding their medication  Educated on signs and symptoms of HF  Educated on low  sodium diet    PLAN:  Scheduled to follow up in CHF clinic on   Future Appointments   Date Time Provider Florencio Brock   2/13/2023  3:40 PM Holly Cueto MD [address] [facility]   2/28/2023 12:30 PM [facility] CHF ROOM 1 [facility]   5/18/2023  2:00 PM [facility]   5/18/2023  2:30 PM Nely Teixeira MD [facility]     Given clinic phone number and aware of signs and symptoms to call with any HF change in symptoms.

## 2023-02-06 ENCOUNTER — APPOINTMENT (OUTPATIENT)
Dept: CT IMAGING | Age: 68
End: 2023-02-06
Payer: MEDICARE

## 2023-02-06 ENCOUNTER — APPOINTMENT (OUTPATIENT)
Dept: GENERAL RADIOLOGY | Age: 68
End: 2023-02-06
Payer: MEDICARE

## 2023-02-06 ENCOUNTER — HOSPITAL ENCOUNTER (EMERGENCY)
Age: 68
Discharge: HOME | End: 2023-02-06
Attending: EMERGENCY MEDICINE
Payer: MEDICARE

## 2023-02-06 VITALS
TEMPERATURE: 97.8 F | SYSTOLIC BLOOD PRESSURE: 133 MMHG | RESPIRATION RATE: 16 BRPM | HEART RATE: 86 BPM | DIASTOLIC BLOOD PRESSURE: 87 MMHG | OXYGEN SATURATION: 98 %

## 2023-02-06 DIAGNOSIS — R73.9 HYPERGLYCEMIA: Primary | ICD-10-CM

## 2023-02-06 DIAGNOSIS — E87.5 HYPERKALEMIA: ICD-10-CM

## 2023-02-06 LAB
ALBUMIN SERPL-MCNC: 3.9 G/DL (ref 3.5–5.2)
ALP BLD-CCNC: 103 U/L (ref 35–104)
ALT SERPL-CCNC: 20 U/L (ref 0–32)
ANION GAP SERPL CALCULATED.3IONS-SCNC: 12 MMOL/L (ref 7–16)
ANION GAP SERPL CALCULATED.3IONS-SCNC: 7 MMOL/L (ref 7–16)
AST SERPL-CCNC: 24 U/L (ref 0–31)
BACTERIA: ABNORMAL /HPF
BASOPHILS ABSOLUTE: 0.05 E9/L (ref 0–0.2)
BASOPHILS RELATIVE PERCENT: 0.9 % (ref 0–2)
BETA-HYDROXYBUTYRATE: 0.73 MMOL/L (ref 0.02–0.27)
BILIRUB SERPL-MCNC: 0.5 MG/DL (ref 0–1.2)
BILIRUBIN URINE: NEGATIVE
BLOOD, URINE: ABNORMAL
BUN BLDV-MCNC: 30 MG/DL (ref 6–23)
BUN BLDV-MCNC: 38 MG/DL (ref 6–23)
CALCIUM SERPL-MCNC: 8.7 MG/DL (ref 8.6–10.2)
CALCIUM SERPL-MCNC: 9.3 MG/DL (ref 8.6–10.2)
CHLORIDE BLD-SCNC: 108 MMOL/L (ref 98–107)
CHLORIDE BLD-SCNC: 96 MMOL/L (ref 98–107)
CHP ED QC CHECK: NORMAL
CLARITY: CLEAR
CO2: 24 MMOL/L (ref 22–29)
CO2: 25 MMOL/L (ref 22–29)
COLOR: YELLOW
CREAT SERPL-MCNC: 1.1 MG/DL (ref 0.5–1)
CREAT SERPL-MCNC: 1.2 MG/DL (ref 0.5–1)
EOSINOPHILS ABSOLUTE: 0.13 E9/L (ref 0.05–0.5)
EOSINOPHILS RELATIVE PERCENT: 2.3 % (ref 0–6)
GFR SERPL CREATININE-BSD FRML MDRD: 49 ML/MIN/1.73
GFR SERPL CREATININE-BSD FRML MDRD: 55 ML/MIN/1.73
GLUCOSE BLD-MCNC: 150 MG/DL (ref 74–99)
GLUCOSE BLD-MCNC: 379 MG/DL
GLUCOSE BLD-MCNC: 436 MG/DL (ref 74–99)
GLUCOSE URINE: >=1000 MG/DL
HCT VFR BLD CALC: 32.2 % (ref 34–48)
HEMOGLOBIN: 10.6 G/DL (ref 11.5–15.5)
IMMATURE GRANULOCYTES #: 0.01 E9/L
IMMATURE GRANULOCYTES %: 0.2 % (ref 0–5)
KETONES, URINE: NEGATIVE MG/DL
LEUKOCYTE ESTERASE, URINE: NEGATIVE
LYMPHOCYTES ABSOLUTE: 0.96 E9/L (ref 1.5–4)
LYMPHOCYTES RELATIVE PERCENT: 17 % (ref 20–42)
MCH RBC QN AUTO: 28.7 PG (ref 26–35)
MCHC RBC AUTO-ENTMCNC: 32.9 % (ref 32–34.5)
MCV RBC AUTO: 87.3 FL (ref 80–99.9)
METER GLUCOSE: 179 MG/DL (ref 74–99)
METER GLUCOSE: 379 MG/DL (ref 74–99)
MONOCYTES ABSOLUTE: 0.38 E9/L (ref 0.1–0.95)
MONOCYTES RELATIVE PERCENT: 6.7 % (ref 2–12)
NEUTROPHILS ABSOLUTE: 4.12 E9/L (ref 1.8–7.3)
NEUTROPHILS RELATIVE PERCENT: 72.9 % (ref 43–80)
NITRITE, URINE: NEGATIVE
PDW BLD-RTO: 13.8 FL (ref 11.5–15)
PH UA: 5.5 (ref 5–9)
PLATELET # BLD: 326 E9/L (ref 130–450)
PMV BLD AUTO: 10.2 FL (ref 7–12)
POTASSIUM SERPL-SCNC: 4.8 MMOL/L (ref 3.5–5)
POTASSIUM SERPL-SCNC: 5.5 MMOL/L (ref 3.5–5)
PROTEIN UA: NEGATIVE MG/DL
RBC # BLD: 3.69 E12/L (ref 3.5–5.5)
RBC UA: ABNORMAL /HPF (ref 0–2)
SODIUM BLD-SCNC: 132 MMOL/L (ref 132–146)
SODIUM BLD-SCNC: 140 MMOL/L (ref 132–146)
SPECIFIC GRAVITY UA: <=1.005 (ref 1–1.03)
T3 FREE: 2.5 PG/ML (ref 2–4.4)
T4 FREE: 1.44 NG/DL (ref 0.93–1.7)
TOTAL PROTEIN: 6.6 G/DL (ref 6.4–8.3)
TROPONIN, HIGH SENSITIVITY: 27 NG/L (ref 0–9)
TROPONIN, HIGH SENSITIVITY: 28 NG/L (ref 0–9)
TSH SERPL DL<=0.05 MIU/L-ACNC: 1.88 UIU/ML (ref 0.27–4.2)
UROBILINOGEN, URINE: 0.2 E.U./DL
WBC # BLD: 5.7 E9/L (ref 4.5–11.5)
WBC UA: ABNORMAL /HPF (ref 0–5)

## 2023-02-06 PROCEDURE — 71045 X-RAY EXAM CHEST 1 VIEW: CPT

## 2023-02-06 PROCEDURE — 82962 GLUCOSE BLOOD TEST: CPT

## 2023-02-06 PROCEDURE — 96361 HYDRATE IV INFUSION ADD-ON: CPT

## 2023-02-06 PROCEDURE — 6370000000 HC RX 637 (ALT 250 FOR IP)

## 2023-02-06 PROCEDURE — 85025 COMPLETE CBC W/AUTO DIFF WBC: CPT

## 2023-02-06 PROCEDURE — 80053 COMPREHEN METABOLIC PANEL: CPT

## 2023-02-06 PROCEDURE — 84439 ASSAY OF FREE THYROXINE: CPT

## 2023-02-06 PROCEDURE — 82010 KETONE BODYS QUAN: CPT

## 2023-02-06 PROCEDURE — 84481 FREE ASSAY (FT-3): CPT

## 2023-02-06 PROCEDURE — 80048 BASIC METABOLIC PNL TOTAL CA: CPT

## 2023-02-06 PROCEDURE — 96376 TX/PRO/DX INJ SAME DRUG ADON: CPT

## 2023-02-06 PROCEDURE — 70450 CT HEAD/BRAIN W/O DYE: CPT

## 2023-02-06 PROCEDURE — 36415 COLL VENOUS BLD VENIPUNCTURE: CPT

## 2023-02-06 PROCEDURE — 81001 URINALYSIS AUTO W/SCOPE: CPT

## 2023-02-06 PROCEDURE — 84484 ASSAY OF TROPONIN QUANT: CPT

## 2023-02-06 PROCEDURE — 2580000003 HC RX 258

## 2023-02-06 PROCEDURE — 6360000002 HC RX W HCPCS

## 2023-02-06 PROCEDURE — 99285 EMERGENCY DEPT VISIT HI MDM: CPT

## 2023-02-06 PROCEDURE — 84443 ASSAY THYROID STIM HORMONE: CPT

## 2023-02-06 PROCEDURE — 96374 THER/PROPH/DIAG INJ IV PUSH: CPT

## 2023-02-06 PROCEDURE — 93005 ELECTROCARDIOGRAM TRACING: CPT

## 2023-02-06 RX ORDER — LORAZEPAM 2 MG/ML
0.5 INJECTION INTRAMUSCULAR ONCE
Status: DISCONTINUED | OUTPATIENT
Start: 2023-02-06 | End: 2023-02-06

## 2023-02-06 RX ORDER — LORAZEPAM 2 MG/ML
1 INJECTION INTRAMUSCULAR ONCE
Status: DISCONTINUED | OUTPATIENT
Start: 2023-02-06 | End: 2023-02-06 | Stop reason: HOSPADM

## 2023-02-06 RX ORDER — 0.9 % SODIUM CHLORIDE 0.9 %
1500 INTRAVENOUS SOLUTION INTRAVENOUS ONCE
Status: COMPLETED | OUTPATIENT
Start: 2023-02-06 | End: 2023-02-06

## 2023-02-06 RX ORDER — 0.9 % SODIUM CHLORIDE 0.9 %
500 INTRAVENOUS SOLUTION INTRAVENOUS ONCE
Status: COMPLETED | OUTPATIENT
Start: 2023-02-06 | End: 2023-02-06

## 2023-02-06 RX ADMIN — SODIUM CHLORIDE 1500 ML: 9 INJECTION, SOLUTION INTRAVENOUS at 14:05

## 2023-02-06 RX ADMIN — SODIUM CHLORIDE 500 ML: 9 INJECTION, SOLUTION INTRAVENOUS at 12:29

## 2023-02-06 RX ADMIN — INSULIN HUMAN 10 UNITS: 100 INJECTION, SOLUTION PARENTERAL at 12:24

## 2023-02-06 RX ADMIN — LORAZEPAM 0.5 MG: 2 INJECTION INTRAMUSCULAR; INTRAVENOUS at 11:15

## 2023-02-06 ASSESSMENT — ENCOUNTER SYMPTOMS
SHORTNESS OF BREATH: 0
COLOR CHANGE: 0
DIARRHEA: 0
NAUSEA: 0
SORE THROAT: 0
BACK PAIN: 0
CONSTIPATION: 0
RHINORRHEA: 0
ABDOMINAL PAIN: 0
VOMITING: 0
COUGH: 0

## 2023-02-06 ASSESSMENT — PAIN - FUNCTIONAL ASSESSMENT: PAIN_FUNCTIONAL_ASSESSMENT: NONE - DENIES PAIN

## 2023-02-06 NOTE — ED PROVIDER NOTES
ATTENDING PROVIDER ATTESTATION:     Alexandra Wells presented to the emergency department for evaluation of Hyperglycemia (Pt comes in with c/o general wekness and hyperglycemia of 402 mg/dl. Pt sttes that she has been not feeling well since her medication changes in early January.)   and was initially evaluated by the Medical Resident. See Original ED Note for H&P and ED course above. I have reviewed and discussed the case, including pertinent history (medical, surgical, family and social) and exam findings with the Medical Resident assigned to Alexandra Wells. I have personally performed and/or participated in the history, exam, medical decision making, and procedures and agree with all pertinent clinical information and any additional changes or corrections are noted below in my assessment and plan. I have discussed this patient in detail with the resident, and provided the instruction and education,       I have reviewed my findings and recommendations with the assigned Medical Resident, Alexandra Wells and members of family present at the time of disposition. I have performed a history and physical examination of this patient and directly supervised the resident caring for this patient              [address]        Pt Name: Alexandra Wells  MRN: [id number]  [place] 1955  Date of evaluation: 2/6/2023  Provider: Jan Louis MD  PCP: Gisselle Gilmore MD  Note Started: 10:50 AM EST 2/6/23    CHIEF COMPLAINT       Chief Complaint   Patient presents with    Hyperglycemia     Pt comes in with c/o general wekness and hyperglycemia of 402 mg/dl. Pt sttes that she has been not feeling well since her medication changes in early January. HISTORY OF PRESENT ILLNESS: 1 or more Elements     Limitations to history : None    Alexandra Wells is a 79 y.o. female who presents for hyperglycemia.   Patient reports she has had difficulty controlling her blood sugar lately. She reports spikes with blood sugar up and down. She says she felt generally weak and fatigued when her sugar is. She has felt dehydrated. No fevers or chills. No chest pain or shortness of breath. She reports some nausea when her blood sugar is high. She reports it has been 400 at times at home. She denies infectious symptoms. She reports that she is trying to find the right dose of insulin. Nursing Notes were all reviewed and agreed with or any disagreements were addressed in the HPI. REVIEW OF EXTERNAL NOTE :       Recent discharge summary from Dr. Denia Sewell on January 8, 2023    Controlled Substance Monitoring:    Acute and Chronic Pain Monitoring:   No flowsheet data found. REVIEW OF SYSTEMS :      Positives and Pertinent negatives as per HPI. SURGICAL HISTORY     Past Surgical History:   Procedure Laterality Date    APPENDECTOMY      CHOLECYSTECTOMY      FRACTURE SURGERY Right     arm    KNEE ARTHROPLASTY Right 2011    Right TKA. Sheeba Olmos MD       [address]       Discharge Medication List as of 2/6/2023  6:56 PM        CONTINUE these medications which have NOT CHANGED    Details   insulin detemir (LEVEMIR) 100 UNIT/ML injection vial Inject 20 Units into the skin 2 times dailyHistorical Med      Multiple Vitamins-Minerals (CENTRUM SILVER 50+WOMEN) TABS Take by mouthHistorical Med      apixaban (ELIQUIS) 5 MG TABS tablet Take 1 tablet by mouth 2 times daily, Disp-60 tablet, R-1Normal      hydrALAZINE (APRESOLINE) 25 MG tablet Take 1 tablet by mouth every 8 hours, Disp-90 tablet, R-3Normal      carvedilol (COREG) 6.25 MG tablet Take 1 tablet by mouth 2 times daily (with meals), Disp-60 tablet, R-3Normal      verapamil (CALAN SR) 180 MG extended release tablet Take 1.5 tablets by mouth daily, Disp-30 tablet, R-3Normal      LOKELMA 10 g PACK oral suspension Take 10 g by mouth daily, DAWHistorical Med      magnesium oxide (MAG-OX) 400 MG tablet Take 400 mg by mouth 2 times dailyHistorical Med      methocarbamol (ROBAXIN) 500 MG tablet Take 500 mg by mouth at bedtimeHistorical Med      insulin 70-30 (HUMULIN;NOVOLIN) (70-30) 100 UNIT per ML injection vial Inject into the skin 2 times dailyHistorical Med      alprazolam (XANAX) 1 MG tablet   Take 1 mg by mouth 2 times daily Instructed to take am of procedure      furosemide (LASIX) 20 MG tablet Take 20 mg by mouth daily. aspirin 81 MG EC tablet Take 81 mg by mouth daily. ALLERGIES     Synvisc [hylan g-f 20]    FAMILYHISTORY       Family History   Problem Relation Age of Onset    Diabetes Mother     Arthritis Father     Diabetes Father         SOCIAL HISTORY       Social History     Tobacco Use    Smoking status: Some Days    Smokeless tobacco: Never   Vaping Use    Vaping Use: Never used   Substance Use Topics    Alcohol use: Yes     Alcohol/week: 0.0 standard drinks     Comment: occasional     Drug use: No       SCREENINGS        [place] Coma Scale  Eye Opening: Spontaneous  Best Verbal Response: Oriented  Best Motor Response: Obeys commands  [place] Coma Scale Score: 15                CIWA Assessment  BP: 133/87  Heart Rate: 86           PHYSICAL EXAM  1 or more Elements     ED Triage Vitals [02/06/23 1009]   BP Temp Temp src Heart Rate Resp SpO2 Height Weight   (!) 191/77 97.8 °F (36.6 °C) -- 94 17 98 % -- --                Constitutional/General: Alert and oriented x3  Head: Normocephalic and atraumatic  Eyes: PERRL, EOMI, conjunctiva normal, sclera non icteric  ENT:  Oropharynx clear, handling secretions, no trismus, no asymmetry of the posterior oropharynx or uvular edema  Neck: Supple, full ROM, no stridor, no meningeal signs  Respiratory: Lungs clear to auscultation bilaterally, no wheezes, rales, or rhonchi. Not in respiratory distress  Cardiovascular:  Regular rate. Regular rhythm. No murmurs, no gallops, no rubs. 2+ distal pulses.  Equal extremity pulses. Chest: No chest wall tenderness  GI:  Abdomen Soft, Non tender, Non distended. No rebound, guarding, or rigidity. No pulsatile masses. Musculoskeletal: Moves all extremities x 4. Warm and well perfused, no clubbing, no cyanosis, no edema. Capillary refill <3 seconds  Integument: skin warm and dry. No rashes.    Neurologic: GCS 15, no focal deficits, symmetric strength 5/5 in the upper and lower extremities bilaterally  Psychiatric: Normal Affect            DIAGNOSTIC RESULTS   LABS: Interpreted by Naina Bryant MD    Labs Reviewed   CBC WITH AUTO DIFFERENTIAL - Abnormal; Notable for the following components:       Result Value    Hemoglobin 10.6 (*)     Hematocrit 32.2 (*)     Lymphocytes % 17.0 (*)     Lymphocytes Absolute 0.96 (*)     All other components within normal limits   COMPREHENSIVE METABOLIC PANEL - Abnormal; Notable for the following components:    Potassium 5.5 (*)     Chloride 96 (*)     Glucose 436 (*)     BUN 38 (*)     Creatinine 1.2 (*)     All other components within normal limits   TROPONIN - Abnormal; Notable for the following components:    Troponin, High Sensitivity 28 (*)     All other components within normal limits   URINALYSIS WITH MICROSCOPIC - Abnormal; Notable for the following components:    Glucose, Ur >=1000 (*)     Bacteria, UA RARE (*)     All other components within normal limits   BETA-HYDROXYBUTYRATE - Abnormal; Notable for the following components:    Beta-Hydroxybutyrate 0.73 (*)     All other components within normal limits   TROPONIN - Abnormal; Notable for the following components:    Troponin, High Sensitivity 27 (*)     All other components within normal limits   BASIC METABOLIC PANEL - Abnormal; Notable for the following components:    Chloride 108 (*)     Glucose 150 (*)     BUN 30 (*)     Creatinine 1.1 (*)     All other components within normal limits   POCT GLUCOSE - Abnormal; Notable for the following components:    Meter Glucose 379 (*)     All other components within normal limits   POCT GLUCOSE - Abnormal; Notable for the following components:    Meter Glucose 179 (*)     All other components within normal limits   POCT GLUCOSE - Normal   TSH   T4, FREE   T3, FREE       As interpreted by me, the above displayed labs are abnormal. All other labs obtained during this visit were within normal range or not returned as of this dictation. RADIOLOGY:   Unless a wet read is documented in MDM or ED course,  non-plain film images such as CT, Ultrasound and MRI are read by the radiologist. Plain radiographic images are visualized and preliminarily interpreted by the ED Provider with the findings documented in the ED course:    Interpretation per the Radiologist below, if available at the time of this note:    CT Head W/O Contrast   Final Result   No acute intracranial abnormality. XR CHEST PORTABLE   Final Result   No acute process. No results found. No results found. PROCEDURES   Unless otherwise noted below, none       PAST MEDICAL HISTORY/Chronic Conditions Affecting Care      has a past medical history of Anxiety, Bilateral carotid artery stenosis (10/7/2021), Bruit of left carotid artery (7/9/2020), Decreased dorsalis pedis pulse (7/9/2020), Humeral fracture, Hypertension, Kidney disease, Left carotid stenosis (7/9/2020), Leg pain, Osteoarthritis of right knee (5/4/2011), Superficial phlebitis of leg, right (8/12/2021), Type II or unspecified type diabetes mellitus without mention of complication, not stated as uncontrolled, and Varicose veins of leg with pain, right (8/12/2021).      EMERGENCY DEPARTMENT COURSE    Vitals:    Vitals:    02/06/23 1029 02/06/23 1055 02/06/23 1227 02/06/23 1905   BP: (!) 205/84 (!) 184/74 (!) 162/67 133/87   Pulse: 94  93 86   Resp: 16  18 16   Temp:       SpO2: 100%  97% 98%       Patient was given the following medications:  Medications   insulin regular (HUMULIN R;NOVOLIN R) injection 10 Units (10 Units IntraVENous Given 2/6/23 1224)   0.9 % sodium chloride bolus (0 mLs IntraVENous Stopped 2/6/23 1522)   0.9 % sodium chloride bolus (0 mLs IntraVENous Stopped 2/6/23 1610)             Medical Decision Making/Differential Diagnosis:    CC/HPI Summary, Social Determinants of health, Records Reviewed, DDx, testing done/not done, ED Course, Reassessment, disposition considerations/shared decision making with patient, consults, disposition:              Medical Decision Making  Hyperglycemia, differential includes but not limited to diabetic ketoacidosis, hyperosmolar nonketotic state, electrolyte abnormalities, infection. My independent interpretation of the labs show initial BMP with glucose 436 and potassium 5.5, after intravenous fluids and intravenous insulin, repeat chemistry panel had blood glucose down to 150 and potassium down to 4.5. Anion gap normal.  Troponin with nonsignificant delta. CBC with stable anemia hemoglobin 10.6. He is nontoxic-appearing. Not in distress. Considered hospitalization but she is not in diabetic ketoacidosis and she improves and no indication to keep her in the hospital.  Beta hydroxybutyrate 0.73 per my interpretation as well. We did obtain imaging studies including a CT scan of the head that was read by radiology as normal, as well as a portable chest x-ray and based on my interpretation there is no gross pneumothorax or gross lobar infiltrate. He is appropriate for discharge home. Advise she follow back with her family doctor for additional adjustments in her medication. Problems Addressed:  Hyperglycemia: acute illness or injury  Hyperkalemia: acute illness or injury    Amount and/or Complexity of Data Reviewed  External Data Reviewed: notes. Labs: ordered. Decision-making details documented in ED Course. Radiology: ordered and independent interpretation performed. Decision-making details documented in ED Course.   ECG/medicine tests: ordered and independent interpretation performed. Decision-making details documented in ED Course. Risk  OTC drugs. Prescription drug management. Drug therapy requiring intensive monitoring for toxicity. CONSULTS:   IP CONSULT TO ENDOCRINOLOGY              CRITICAL CARE TIME (.cct)           I am the Primary Clinician of Record. FINAL IMPRESSION      1. Hyperglycemia    2.  Hyperkalemia          DISPOSITION/PLAN     DISPOSITION Decision To Discharge 02/06/2023 06:11:51 PM      PATIENT REFERRED TO:  Oscar Wilcox, [address] [facility] [place] [phone]      As needed    Franny Samson, [address] [place] [id number]  [phone]    Go to   You have an appointment scheduled for 9:00 AM tomorrow, 02/07/2023    DISCHARGE MEDICATIONS:  Discharge Medication List as of 2/6/2023  6:56 PM          DISCONTINUED MEDICATIONS:  Discharge Medication List as of 2/6/2023  6:56 PM                 (Please note that portions of this note were completed with a voice recognition program.  Efforts were made to edit the dictations but occasionally words are mis-transcribed.)    Rianna Tatum MD (electronically signed)            Cecil Nieto MD  02/06/23 0516

## 2023-02-06 NOTE — ED PROVIDER NOTES
[facility] ENCOUNTER        Pt Name: Amber Rush  MRN: [id number]  [place] 1955  Date of evaluation: 2/6/2023  Provider: Srinivas Montes DO  PCP: Serjio Barrientos MD  Note Started: 11:04 AM EST 2/6/23    CHIEF COMPLAINT       Chief Complaint   Patient presents with    Hyperglycemia     Pt comes in with c/o general wekness and hyperglycemia of 402 mg/dl. Pt sttes that she has been not feeling well since her medication changes in early January. HISTORY OF PRESENT ILLNESS   History obtained from: patient    Amber Rush is a 79 y.o. female with PMH of CHF (EF 60-65%), HTN, CKD, and type II IDDM (A1c 10) who presents with syncope and hyperglycemia. Patient reports she has been diabetic for about 40 years. She was recently admitted about 1 month ago for CHF and was incidentally found to have a RLE DVT. She was discharged with new medications: Eliquis, hydralazine, carvedilol, and verapamil. Since being discharged, she has had wildly fluctuating blood glucose. Reports being as high as 500s, and as low as 30s. Currently prescribed Levemir 20 units twice daily, but states she never takes more than 18 units due to being very sensitive to insulin. Patient had an episode of syncope this morning which she does not recall; she remembers waking up on the floor. Her family called EMS, she was found to have blood sugar 402. Currently denies headache, fever/chills, congestion, cough, shortness of breath, chest pain, palpitations, leg swelling, abdominal pain, nausea/vomiting, changes in bowel movements, urinary symptoms. 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 Nursing Notes were all reviewed and agreed with or any disagreements were addressed in the HPI.     REVIEW OF SYSTEMS     Review of Systems   Constitutional:  Negative for chills and fever. HENT:  Negative for congestion, rhinorrhea and sore throat. Eyes:  Negative for visual disturbance. Respiratory:  Negative for cough and shortness of breath. Cardiovascular:  Negative for chest pain, palpitations and leg swelling. Gastrointestinal:  Negative for abdominal pain, constipation, diarrhea, nausea and vomiting. Endocrine:        Positive for hyperglycemia and hypoglycemia. Genitourinary:  Negative for difficulty urinating, dysuria, flank pain and frequency. Musculoskeletal:  Negative for back pain and neck pain. Skin:  Negative for color change, rash and wound. Neurological:  Positive for syncope. Negative for dizziness, weakness, light-headedness, numbness and headaches. Psychiatric/Behavioral:  The patient is nervous/anxious. PAST MEDICAL HISTORY     Past Medical History:   Diagnosis Date    Anxiety     Bilateral carotid artery stenosis 10/7/2021    Bruit of left carotid artery 7/9/2020    Decreased dorsalis pedis pulse 7/9/2020    Humeral fracture     right    Hypertension     Kidney disease     Left carotid stenosis 7/9/2020    Leg pain     Osteoarthritis of right knee 5/4/2011    Superficial phlebitis of leg, right 8/12/2021    Type II or unspecified type diabetes mellitus without mention of complication, not stated as uncontrolled     Varicose veins of leg with pain, right 8/12/2021       SURGICAL HISTORY     Past Surgical History:   Procedure Laterality Date    APPENDECTOMY      CHOLECYSTECTOMY      FRACTURE SURGERY Right     arm    KNEE ARTHROPLASTY Right 2011    Right TKA. Mich Fernandez MD       CURRENTMEDICATIONS       Previous Medications    ALPRAZOLAM (XANAX) 1 MG TABLET      Take 1 mg by mouth 2 times daily Instructed to take am of procedure    APIXABAN (ELIQUIS) 5 MG TABS TABLET    Take 1 tablet by mouth 2 times daily    ASPIRIN 81 MG EC TABLET    Take 81 mg by mouth daily.       CARVEDILOL (COREG) 6.25 MG TABLET    Take 1 tablet by mouth 2 times daily (with meals)    FUROSEMIDE (LASIX) 20 MG TABLET    Take 20 mg by mouth daily. HYDRALAZINE (APRESOLINE) 25 MG TABLET    Take 1 tablet by mouth every 8 hours    INSULIN 70-30 (HUMULIN;NOVOLIN) (70-30) 100 UNIT PER ML INJECTION VIAL    Inject into the skin 2 times daily    INSULIN DETEMIR (LEVEMIR) 100 UNIT/ML INJECTION VIAL    Inject 20 Units into the skin 2 times daily    LOKELMA 10 G PACK ORAL SUSPENSION    Take 10 g by mouth daily    MAGNESIUM OXIDE (MAG-OX) 400 MG TABLET    Take 400 mg by mouth 2 times daily    METHOCARBAMOL (ROBAXIN) 500 MG TABLET    Take 500 mg by mouth at bedtime    MULTIPLE VITAMINS-MINERALS (CENTRUM SILVER 50+WOMEN) TABS    Take by mouth    VERAPAMIL (CALAN SR) 180 MG EXTENDED RELEASE TABLET    Take 1.5 tablets by mouth daily       ALLERGIES     Synvisc [hylan g-f 20]    FAMILYHISTORY       Family History   Problem Relation Age of Onset    Diabetes Mother     Arthritis Father     Diabetes Father         SOCIAL HISTORY       Social History     Tobacco Use    Smoking status: Some Days    Smokeless tobacco: Never   Vaping Use    Vaping Use: Never used   Substance Use Topics    Alcohol use: Yes     Alcohol/week: 0.0 standard drinks     Comment: occasional     Drug use: No       SCREENINGS        Les Coma Scale  Eye Opening: Spontaneous  Best Verbal Response: Oriented  Best Motor Response: Obeys commands  Les Coma Scale Score: 15                CIWA Assessment  BP: (!) 162/67  Heart Rate: 93           PHYSICAL EXAM     ED Triage Vitals [02/06/23 1009]   BP Temp Temp src Heart Rate Resp SpO2 Height Weight   (!) 191/77 97.8 °F (36.6 °C) -- 94 17 98 % -- --       Physical Exam  Vitals reviewed. Constitutional:       General: She is not in acute distress. Appearance: Normal appearance. She is not ill-appearing. HENT:      Head: Normocephalic and atraumatic.       Mouth/Throat:      Mouth: Mucous membranes are moist.      Pharynx: Oropharynx is clear. Cardiovascular:      Rate and Rhythm: Normal rate and regular rhythm. Heart sounds: No murmur heard. No friction rub. No gallop. Pulmonary:      Effort: No respiratory distress. Breath sounds: Normal breath sounds. Abdominal:      General: There is no distension. Palpations: Abdomen is soft. Tenderness: There is no abdominal tenderness. Musculoskeletal:         General: No swelling or tenderness. Cervical back: Neck supple. Skin:     General: Skin is warm and dry. Neurological:      General: No focal deficit present. Mental Status: She is alert. Mental status is at baseline. Psychiatric:         Mood and Affect: Mood is anxious. Affect is tearful.        DIAGNOSTIC RESULTS   LABS:    Labs Reviewed   CBC WITH AUTO DIFFERENTIAL - Abnormal; Notable for the following components:       Result Value    Hemoglobin 10.6 (*)     Hematocrit 32.2 (*)     Lymphocytes % 17.0 (*)     Lymphocytes Absolute 0.96 (*)     All other components within normal limits   COMPREHENSIVE METABOLIC PANEL - Abnormal; Notable for the following components:    Potassium 5.5 (*)     Chloride 96 (*)     Glucose 436 (*)     BUN 38 (*)     Creatinine 1.2 (*)     All other components within normal limits   TROPONIN - Abnormal; Notable for the following components:    Troponin, High Sensitivity 28 (*)     All other components within normal limits   URINALYSIS WITH MICROSCOPIC - Abnormal; Notable for the following components:    Glucose, Ur >=1000 (*)     Bacteria, UA RARE (*)     All other components within normal limits   BETA-HYDROXYBUTYRATE - Abnormal; Notable for the following components:    Beta-Hydroxybutyrate 0.73 (*)     All other components within normal limits   TROPONIN - Abnormal; Notable for the following components:    Troponin, High Sensitivity 27 (*)     All other components within normal limits   BASIC METABOLIC PANEL - Abnormal; Notable for the following components: Chloride 108 (*)     Glucose 150 (*)     BUN 30 (*)     Creatinine 1.1 (*)     All other components within normal limits   POCT GLUCOSE - Abnormal; Notable for the following components:    Meter Glucose 379 (*)     All other components within normal limits   POCT GLUCOSE - Abnormal; Notable for the following components:    Meter Glucose 179 (*)     All other components within normal limits   POCT GLUCOSE - Normal   TSH   T4, FREE   T3, FREE       As interpreted by me, the above displayed labs are abnormal. All other labs obtained during this visit were within normal range or not returned as of this dictation. EKG Interpretation  Interpreted by emergency department physician, DO Kip Robertson sinus rhythm, no acute ischemic changes, stable compared to prior EKG 1/29/2023      RADIOLOGY:   Non-plain film images such as CT, Ultrasound and MRI are read by the radiologist. Plain radiographic images are visualized and preliminarily interpreted by the ED Provider with the below findings:    CXR with no acute findings, stable compared to prior study 1/29/2023      Interpretation per the Radiologist below, if available at the time of this note:    CT Head W/O Contrast   Final Result   No acute intracranial abnormality. XR CHEST PORTABLE   Final Result   No acute process.              PROCEDURES   None    CRITICAL CARE TIME   None    EMERGENCY DEPARTMENT COURSE    Vitals:    Vitals:    02/06/23 1009 02/06/23 1029 02/06/23 1055 02/06/23 1227   BP: (!) 191/77 (!) 205/84 (!) 184/74 (!) 162/67   Pulse: 94 94  93   Resp: 17 16  18   Temp: 97.8 °F (36.6 °C)      SpO2: 98% 100%  97%       Patient was given the following medications:  Medications   LORazepam (ATIVAN) injection 1 mg (1 mg IntraVENous Not Given 2/6/23 1129)   insulin regular (HUMULIN R;NOVOLIN R) injection 10 Units (10 Units IntraVENous Given 2/6/23 1224)   0.9 % sodium chloride bolus (0 mLs IntraVENous Stopped 2/6/23 1522)   0.9 % sodium chloride bolus (0 mLs IntraVENous Stopped 2/6/23 1610)       Medical Decision Making/Differential Diagnosis:    CC/HPI Summary, Social Determinants of health, Records Reviewed, DDx, testing done/not done, ED Course, Reassessment, disposition considerations/shared decision making with patient, consults, disposition:            Medical Decision Making  Patient is a 15-year-old female with PMH of CHF, HTN, CKD, NIDDM who presents with syncope and hypoglycemia. Initial presentation, she is hypertensive (191/77) with otherwise normal vital signs. Patient reports anxious and tearful physical exam which is otherwise benign. BP improved after Ativan. EKG shows normal sinus rhythm with no acute changes. Delta troponin negative. CXR and head CT showed no acute abnormality. CMP noted to have glucose 436 and K 5.5. She was given 10 units IV insulin and 2 L NS. Repeat BMP showed glucose 150 and K 4.8.  UA positive for glucose. Beta hydroxybutyrate 0.73. CBC and thyroid studies unremarkable. Endocrinology consulted; Dr. Darlin Favre can see her in outpatient follow-up tomorrow morning. Diagnostic results discussed with the patient. She remains very concerned about her repeated hypoglycemic episodes overnight. Patient was advised to hold her evening dose of insulin tonight. Her niece will be staying with her overnight and is able to take her to her endocrinology appointment tomorrow morning. Patient is agreeable to the plan and is stable for discharge. Amount and/or Complexity of Data Reviewed  Labs: ordered. Decision-making details documented in ED Course. Radiology: ordered and independent interpretation performed. Decision-making details documented in ED Course. ECG/medicine tests: ordered and independent interpretation performed. Decision-making details documented in ED Course. Risk  OTC drugs. Prescription drug management.       CONSULTS: (Who and What was discussed)  Endocrinology, Dr. Darlin Favre - Will see patient in outpatient clinic tomorrow morning.      I am the Primary Clinician of Record.    FINAL IMPRESSION      1. Hyperglycemia    2. Hyperkalemia          DISPOSITION/PLAN     DISPOSITION Decision To Discharge 02/06/2023 06:11:51 PM      PATIENT REFERRED TO:  Edgardo Colon MD  [address] [facility] [address]  [phone]      As needed    Lev Walsh MD  [address] [facility] [address]  [phone]    Go to   You have an appointment scheduled for 9:00 AM tomorrow, 02/07/2023    DISCHARGE MEDICATIONS:  New Prescriptions    No medications on file       DISCONTINUED MEDICATIONS:  Discontinued Medications    No medications on file           (Please note that portions of this note were completed with a voice recognition program.  Efforts were made to edit the dictations but occasionally words are mis-transcribed.)          Luis Woodall, DO  Resident  02/06/23 9802

## 2023-02-06 NOTE — ED NOTES
The following labs were labeled with appropriate pt sticker and tubed to lab:     [] Blue     [x] Lavender   [] on ice  [x] Green/yellow  [] Green/black [] on ice  [] Dorice Brothers  [] on ice  [] Yellow  [] Red  [] Type/ Screen  [] ABG  [] VBG    [] COVID-19 swab    [] Rapid  [] PCR  [] Flu swab  [] Peds Viral Panel     [x] Urine Sample  [] Fecal Sample  [] Pelvic Cultures  [] Blood Cultures  [] X 2  [] STREP Cultures         [place], RN  02/06/23 1058

## 2023-02-07 ENCOUNTER — OFFICE VISIT (OUTPATIENT)
Dept: ENDOCRINOLOGY | Age: 68
End: 2023-02-07
Payer: MEDICARE

## 2023-02-07 ENCOUNTER — FOLLOWUP TELEPHONE ENCOUNTER (OUTPATIENT)
Dept: ENDOCRINOLOGY | Age: 68
End: 2023-02-07

## 2023-02-07 ENCOUNTER — TELEPHONE (OUTPATIENT)
Dept: ENDOCRINOLOGY | Age: 68
End: 2023-02-07

## 2023-02-07 VITALS
HEIGHT: 65 IN | WEIGHT: 153 LBS | DIASTOLIC BLOOD PRESSURE: 60 MMHG | SYSTOLIC BLOOD PRESSURE: 108 MMHG | HEART RATE: 87 BPM | BODY MASS INDEX: 25.49 KG/M2

## 2023-02-07 DIAGNOSIS — E11.65 TYPE 2 DIABETES MELLITUS WITH HYPERGLYCEMIA, WITH LONG-TERM CURRENT USE OF INSULIN (HCC): Primary | ICD-10-CM

## 2023-02-07 DIAGNOSIS — E11.65 TYPE 2 DIABETES MELLITUS WITH HYPERGLYCEMIA, WITH LONG-TERM CURRENT USE OF INSULIN (HCC): ICD-10-CM

## 2023-02-07 DIAGNOSIS — Z79.4 TYPE 2 DIABETES MELLITUS WITH HYPERGLYCEMIA, WITH LONG-TERM CURRENT USE OF INSULIN (HCC): ICD-10-CM

## 2023-02-07 DIAGNOSIS — Z79.4 TYPE 2 DIABETES MELLITUS WITH HYPERGLYCEMIA, WITH LONG-TERM CURRENT USE OF INSULIN (HCC): Primary | ICD-10-CM

## 2023-02-07 PROBLEM — N18.31 STAGE 3A CHRONIC KIDNEY DISEASE (HCC): Status: ACTIVE | Noted: 2021-02-22

## 2023-02-07 PROBLEM — E11.22 TYPE 2 DIABETES MELLITUS WITH CHRONIC KIDNEY DISEASE (HCC): Status: ACTIVE | Noted: 2021-02-22

## 2023-02-07 PROBLEM — R80.9 PROTEINURIA DUE TO TYPE 2 DIABETES MELLITUS (HCC): Status: ACTIVE | Noted: 2021-03-24

## 2023-02-07 PROBLEM — E11.40 DIABETIC NEUROPATHY (HCC): Status: ACTIVE | Noted: 2023-02-07

## 2023-02-07 PROBLEM — E55.9 VITAMIN D DEFICIENCY: Status: ACTIVE | Noted: 2023-02-07

## 2023-02-07 PROBLEM — E11.29 PROTEINURIA DUE TO TYPE 2 DIABETES MELLITUS (HCC): Status: ACTIVE | Noted: 2021-03-24

## 2023-02-07 LAB
ANION GAP SERPL CALCULATED.3IONS-SCNC: 16 MMOL/L (ref 7–16)
BUN BLDV-MCNC: 32 MG/DL (ref 6–23)
CALCIUM SERPL-MCNC: 9.5 MG/DL (ref 8.6–10.2)
CHLORIDE BLD-SCNC: 95 MMOL/L (ref 98–107)
CO2: 21 MMOL/L (ref 22–29)
CREAT SERPL-MCNC: 1.2 MG/DL (ref 0.5–1)
EKG ATRIAL RATE: 96 BPM
EKG P AXIS: 77 DEGREES
EKG P-R INTERVAL: 204 MS
EKG Q-T INTERVAL: 354 MS
EKG QRS DURATION: 88 MS
EKG QTC CALCULATION (BAZETT): 447 MS
EKG R AXIS: 14 DEGREES
EKG T AXIS: 68 DEGREES
EKG VENTRICULAR RATE: 96 BPM
GFR SERPL CREATININE-BSD FRML MDRD: 49 ML/MIN/1.73
GLUCOSE BLD-MCNC: 497 MG/DL (ref 74–99)
HBA1C MFR BLD: 8.4 %
POTASSIUM SERPL-SCNC: 6 MMOL/L (ref 3.5–5)
SODIUM BLD-SCNC: 132 MMOL/L (ref 132–146)

## 2023-02-07 PROCEDURE — 93010 ELECTROCARDIOGRAM REPORT: CPT | Performed by: INTERNAL MEDICINE

## 2023-02-07 PROCEDURE — 1123F ACP DISCUSS/DSCN MKR DOCD: CPT | Performed by: NURSE PRACTITIONER

## 2023-02-07 PROCEDURE — 3052F HG A1C>EQUAL 8.0%<EQUAL 9.0%: CPT | Performed by: NURSE PRACTITIONER

## 2023-02-07 PROCEDURE — 83036 HEMOGLOBIN GLYCOSYLATED A1C: CPT | Performed by: NURSE PRACTITIONER

## 2023-02-07 PROCEDURE — 99204 OFFICE O/P NEW MOD 45 MIN: CPT | Performed by: NURSE PRACTITIONER

## 2023-02-07 RX ORDER — INSULIN LISPRO 100 [IU]/ML
INJECTION, SOLUTION INTRAVENOUS; SUBCUTANEOUS
Qty: 3 ADJUSTABLE DOSE PRE-FILLED PEN SYRINGE | Refills: 3 | Status: SHIPPED | OUTPATIENT
Start: 2023-02-07

## 2023-02-07 RX ORDER — PEN NEEDLE, DIABETIC 31 G X1/4"
NEEDLE, DISPOSABLE MISCELLANEOUS
Qty: 150 EACH | Refills: 3 | Status: SHIPPED | OUTPATIENT
Start: 2023-02-07

## 2023-02-07 NOTE — TELEPHONE ENCOUNTER
Patient presents with T2D, CHF and CKD stage 3 with an order for low potassium diet. Met in endo office for nutrition education. Patient appears frustrated and anxious. She is confused by conflicting diet advice. She is upset she was advised to avoid animal products by her nephrologist and encouraged to eat mainly fruits & vegetables. Explained diet guidelines for management of diabetes, congestive heart failure and renal disease. Explained that her diet is very unique to her needs and conflicting diet guidelines are normal. Per diet recall, patient eats eggs with swiss cheese and one slice toast for breakfast. Lunch is an apple or apple sauce with greek yogurt and a croissant. Dinner is usually chicken, broccoli and salad. We reviewed food lists for both low & high potassium foods and compared them to the food lists for both CHF and CKD management as well as her own diet. Encouraged patient to familiarize herself with the list of low potassium foods and base her grocery list off of it. Reviewed reading nutrition facts labels, specifically checking for sodium and total carbohydrates. Patient is familiar with foods high in sodium and tries to avoid processed foods and packaged meals. Recommended 2-3 servings (30-45 grams) CHO at each meal and 1 serving (15 grams) with snacks. She is scheduled to meet with RDN for additional education & meal planning 2/15/23.      Jennifer David RDN LD

## 2023-02-07 NOTE — TELEPHONE ENCOUNTER
Patient called in,  the pharmacy was out of the Humalog she will call around to see if they have it at an other pharmacy.

## 2023-02-07 NOTE — PROGRESS NOTES
[address] [place] [address] Department of Endocrinology Diabetes and Metabolism   [address] [facility] [address]   Phone: [phone]  Fax: [phone]    Date of Service: 2/7/2023    Primary Care Physician: Lev Harper MD  Referring physician: No ref. provider found  Provider: Sharyle Primrose, APRN - NP     Reason for the visit:    New pt referral, DM Type     History of Present Illness: The history is provided by the patient. No  was used. Accuracy of the patient data is excellent. .  She was recently admitted about 1 month ago for CHF and was incidentally found to have a RLE DVT. Seferino Lemus is a very pleasant 79 y.o. female seen today for diabetes management     Seferino Lemus was diagnosed with diabetes at age 20-22 .  She has been on insulin since dx and currently on Levemir 20 units BID which she started 2 weeks ago     Prior to that she was on Humulin Rellion  70/30 18-20 units BID     A1c 8.4%  The patient has been checking blood sugar  6-7x due to hypoglycemia   BS this     Most recent A1c results summarized below  Lab Results   Component Value Date/Time    LABA1C 9.2 06/08/2011 09:26 AM     Patient reported hypoglycemic episodes since the last month   The patient has been mindful of what has been eating and following diabetic diet as encouraged  She eats 3 meals a day (low K+ and NA+ diet)  For breakfast - eggs with cheese, wheat toast (SF jello)  For lunch low sugar yogurt or applesauce or a croissant ->or Low Na+  Tuna  For dinner 4 oz chicken, broccoli, salads,   I reviewed current medications and the patient has no issues with diabetes medications  Seferino Lemus is up to date with eye exam and mild hx of diabetic retinopathy   Appt was last late 2022  The patient performs  own feet care and sees podiatry   Microvascular complications:  + Retinopathy, +Nephropathy,  + Neuropathy   Macrovascular complications: no CAD, PVD, or Stroke  The patient receives Flushot every year and up to date with the Pneumonia vaccine     PAST MEDICAL HISTORY   Past Medical History:   Diagnosis Date    Anxiety     Bilateral carotid artery stenosis 10/7/2021    Bruit of left carotid artery 7/9/2020    Decreased dorsalis pedis pulse 7/9/2020    Humeral fracture     right    Hypertension     Kidney disease     Left carotid stenosis 7/9/2020    Leg pain     Osteoarthritis of right knee 5/4/2011    Superficial phlebitis of leg, right 8/12/2021    Type II or unspecified type diabetes mellitus without mention of complication, not stated as uncontrolled     Varicose veins of leg with pain, right 8/12/2021       PAST SURGICAL HISTORY   Past Surgical History:   Procedure Laterality Date    APPENDECTOMY      CHOLECYSTECTOMY      FRACTURE SURGERY Right     arm    KNEE ARTHROPLASTY Right 2011    Right TKA. Ivy Olmos MD       SOCIAL HISTORY   Tobacco:   reports that she has never smoked. She has never used smokeless tobacco.  Alcohol:   reports no history of alcohol use. Drugs:   reports no history of drug use.     FAMILY HISTORY   Family History   Problem Relation Age of Onset    Diabetes Mother     Arthritis Father     Diabetes Father        ALLERGIES AND DRUG REACTIONS   Allergies   Allergen Reactions    Synvisc [Hylan G-F 20]      Local swelling       CURRENT MEDICATIONS   Current Outpatient Medications   Medication Sig Dispense Refill    insulin lispro, 1 Unit Dial, (HUMALOG KWIKPEN) 100 UNIT/ML SOPN Inject 2 units plus low SS before meals BS  150-200 = 1 units, 201-250 = 2 units, 251-300 = 3 units, 301-350 = 4 units, 351-400 = 5 units, 401 = 6 units 3 Adjustable Dose Pre-filled Pen Syringe 3    Insulin Pen Needle ([employer] PEN NEEDLES) 31G X 6 MM MISC Pt uses insulin injections 4 x a day 150 each 3    insulin detemir (LEVEMIR) 100 UNIT/ML injection vial Inject 20 Units into the skin 2 times daily      Multiple Vitamins-Minerals (CENTRUM SILVER 50+WOMEN) TABS Take by mouth      apixaban (ELIQUIS) 5 MG TABS tablet Take 1 tablet by mouth 2 times daily 60 tablet 1    hydrALAZINE (APRESOLINE) 25 MG tablet Take 1 tablet by mouth every 8 hours (Patient taking differently: Take 25 mg by mouth every 8 hours Usually taken 2 x day) 90 tablet 3    carvedilol (COREG) 6.25 MG tablet Take 1 tablet by mouth 2 times daily (with meals) 60 tablet 3    verapamil (CALAN SR) 180 MG extended release tablet Take 1.5 tablets by mouth daily 30 tablet 3    magnesium oxide (MAG-OX) 400 MG tablet Take 400 mg by mouth 2 times daily      methocarbamol (ROBAXIN) 500 MG tablet Take 500 mg by mouth at bedtime      alprazolam (XANAX) 1 MG tablet   Take 1 mg by mouth 2 times daily Instructed to take am of procedure      furosemide (LASIX) 20 MG tablet Take 20 mg by mouth daily. aspirin 81 MG EC tablet Take 81 mg by mouth daily. LOKELMA 10 g PACK oral suspension Take 10 g by mouth daily (Patient not taking: Reported on 2/7/2023)       No current facility-administered medications for this visit. Review of Systems  Constitutional: No fever, no chills, no diaphoresis, no generalized weakness. HEENT: No blurred vision, No sore throat, no ear pain, no hair loss  Neck: denied any neck swelling, difficulty swallowing,   Cardio-pulmonary: No CP, SOB or palpitation, No orthopnea or PND. No cough or wheezing. GI: No N/V/D, no constipation, No abdominal pain, no melena or hematochezia   : Denied any dysuria, hematuria, flank pain, discharge, or incontinence. Skin: denied any rash, ulcer, Hirsute, or hyperpigmentation. MSK: denied any joint deformity, joint pain/swelling, muscle pain, or back pain.   Neuro: no numbness, no tingling, no weakness    OBJECTIVE    /60   Pulse 87   Ht 5' 5\" (1.651 m)   Wt 153 lb (69.4 kg)   BMI 25.46 kg/m²   BP Readings from Last 4 Encounters:   02/07/23 108/60   02/06/23 133/87   01/31/23 (!) 147/62   01/30/23 (!) 132/55     Wt Readings from Last 6 Encounters: 02/07/23 153 lb (69.4 kg)   01/31/23 152 lb (68.9 kg)   01/29/23 151 lb (68.5 kg)   01/18/23 154 lb (69.9 kg)   01/07/23 152 lb 2 oz (69 kg)   08/08/22 155 lb (70.3 kg)       Physical examination:  General: awake alert, oriented x3, no abnormal position or movements. HEENT: normocephalic non-traumatic, no exophthalmos   Neck: supple, no LN enlargement, no thyromegaly, no thyroid tenderness, no JVD. Pulm: Clear equal air entry no added sounds, no wheezing or rhonchi    CVS: S1 + S2, no murmur, no heave. Dorsalis pedis pulse palpable   Abd: soft lax, no tenderness, no organomegaly, audible bowel sounds. Skin: warm, no lesions, no rash.  No callus, no Ulcers, No acanthosis nigricans  Musculoskeletal: No back tenderness, no kyphosis/scoliosis    Neuro: CN intact, sensation decreased bilateral , muscle power normal  Psych: normal mood, and affect      Review of Laboratory Data:  I personally reviewed the following lab:  Lab Results   Component Value Date/Time    WBC 5.7 02/06/2023 10:53 AM    RBC 3.69 02/06/2023 10:53 AM    HGB 10.6 (L) 02/06/2023 10:53 AM    HCT 32.2 (L) 02/06/2023 10:53 AM    MCV 87.3 02/06/2023 10:53 AM    MCH 28.7 02/06/2023 10:53 AM    MCHC 32.9 02/06/2023 10:53 AM    RDW 13.8 02/06/2023 10:53 AM     02/06/2023 10:53 AM    MPV 10.2 02/06/2023 10:53 AM      Lab Results   Component Value Date/Time     02/06/2023 04:04 PM    K 4.8 02/06/2023 04:04 PM    CO2 25 02/06/2023 04:04 PM    BUN 30 (H) 02/06/2023 04:04 PM    CREATININE 1.1 (H) 02/06/2023 04:04 PM    CALCIUM 8.7 02/06/2023 04:04 PM    LABGLOM 55 02/06/2023 04:04 PM    GFRAA 50 01/12/2022 04:30 PM      Lab Results   Component Value Date/Time    TSH 1.880 02/06/2023 10:53 AM    T4FREE 1.44 02/06/2023 10:53 AM    FT3 2.5 02/06/2023 10:53 AM     Lab Results   Component Value Date/Time    LABA1C 9.2 06/08/2011 09:26 AM    GLUCOSE 150 02/06/2023 04:04 PM    GLUCOSE 319 06/08/2011 04:55 PM    LABCREA 44 09/17/2021 12:30 PM     Lab Results   Component Value Date/Time    LABA1C 9.2 06/08/2011 09:26 AM     No results found for: TRIG, HDL, LDLCALC, CHOL  No results found for: VITD25    ASSESSMENT & RECOMMENDATIONS   Noah Adame, a 79 y.o.-old female seen in for the following issues       Assessment:      Diagnosis Orders   1. Type 2 diabetes mellitus with hyperglycemia, with long-term current use of insulin ([facility])  C-Peptide    Basic Metabolic Panel    GLUTAMIC ACID DECARBOXYLASE    insulin lispro, 1 Unit Dial, (HUMALOG KWIKPEN) 100 UNIT/ML SOPN    Insulin Pen Needle ([employer] PEN NEEDLES) 31G X 6 MM MISC          Plan:     1. Type 2 diabetes mellitus with hyperglycemia, with long-term current use of insulin ([facility])        Diabetes Mellitus Type     Patient's diabetes is uncontrolled  8.4%  Will check Colton antibody and C-Peptide   Will change DM regimen to:  Levemir to 15 units at AM   Humalog 2 units Plus low SS with meals  Will apply Mario Alberto for hypoglycemia and order as she is using a sliding scale to adjust her insulin dose  The patient was advised to check blood sugars 4 times a day before meals and at bedtime and send BS readings to our office in a week. Discussed with patient A1c and blood sugar goals   Optimal blood sugars: 100-140 pre-prandial, < 180 peak post-prandial  The patient counseled about the complications of uncontrolled diabetes   Patient was counselled about the importance of self-blood glucose monitoring and eating consistent carb diet to avoid blood sugar fluctuations   Patient will need routine diabetes maintenance and prevention  The patient was referred to diabetic educator for further teaching   Discussed lifestyle changes including diet and exercise with patient  Diabetes labs before next visit     Dietary noncompliance  Discussed with patient the importance of eating consistent carbohydrate meals, avoiding high glycemic index food.  Also, discussed with patient the risk and negative consequences of dietary noncompliance on blood glucose control, blood pressure and weight  Will meet with RD today     Polyneuropathy  Was on gabapentin  Now off gabapentin due to recent CHF dx    I personally reviewed external notes from PCP and other patient's care team providers, and personally interpreted labs associated with the above diagnosis. I also ordered labs to further assess and manage the above addressed medical conditions. Return in about 4 weeks (around 3/7/2023) for Type 2 DM . The above issues were reviewed with the patient who understood and agreed with the plan. Thank you for allowing us to participate in the care of this patient. Please do not hesitate to contact us with any additional questions. NEWTON Marvin NP [facility] Diabetes Care and Endocrinology   [address] [facility] [address]   Phone: [phone]  Fax: [phone]  --------------------------------------------  An electronic signature was used to authenticate this note.  NEWTON Marvin NP on 2/7/2023 at 10:05 AM

## 2023-02-07 NOTE — PROGRESS NOTES
I called Dr Kathy Lopez office [phone] and spoke with one of the nurses and gave her the critical potassium value of 6.0.  she stated the will get in contact with the patient.

## 2023-02-08 ENCOUNTER — TELEPHONE (OUTPATIENT)
Dept: ENDOCRINOLOGY | Age: 68
End: 2023-02-08

## 2023-02-08 NOTE — TELEPHONE ENCOUNTER
----- Message from NEWTON Richardson NP sent at 2/7/2023  3:21 PM EST -----  Please call Dr Tse's office to inform him of K+ of  6.0

## 2023-02-10 LAB — C-PEPTIDE: 0.4 NG/ML (ref 0.5–3.3)

## 2023-02-13 ENCOUNTER — OFFICE VISIT (OUTPATIENT)
Dept: CARDIOLOGY CLINIC | Age: 68
End: 2023-02-13
Payer: MEDICARE

## 2023-02-13 VITALS
BODY MASS INDEX: 25.26 KG/M2 | WEIGHT: 151.6 LBS | HEIGHT: 65 IN | RESPIRATION RATE: 18 BRPM | SYSTOLIC BLOOD PRESSURE: 128 MMHG | DIASTOLIC BLOOD PRESSURE: 70 MMHG | HEART RATE: 70 BPM

## 2023-02-13 DIAGNOSIS — I50.9 CONGESTIVE HEART FAILURE OF UNKNOWN ETIOLOGY (HCC): Primary | ICD-10-CM

## 2023-02-13 LAB — GLUTAMIC ACID DECARB AB: <5 IU/ML (ref 0–5)

## 2023-02-13 PROCEDURE — 93000 ELECTROCARDIOGRAM COMPLETE: CPT | Performed by: INTERNAL MEDICINE

## 2023-02-13 PROCEDURE — 99214 OFFICE O/P EST MOD 30 MIN: CPT | Performed by: INTERNAL MEDICINE

## 2023-02-13 PROCEDURE — 1123F ACP DISCUSS/DSCN MKR DOCD: CPT | Performed by: INTERNAL MEDICINE

## 2023-02-13 RX ORDER — ATORVASTATIN CALCIUM 20 MG/1
20 TABLET, FILM COATED ORAL DAILY
Qty: 30 TABLET | Refills: 5 | Status: SHIPPED | OUTPATIENT
Start: 2023-02-13

## 2023-02-13 RX ORDER — BLOOD-GLUCOSE METER
EACH MISCELLANEOUS
COMMUNITY
Start: 2022-11-14

## 2023-02-13 NOTE — PROGRESS NOTES
OUTPATIENT CARDIOLOGY FOLLOW-UP    Name: Antwan Sierra    Age: 79 y.o. Primary Care Physician: Kerwin Carr MD    Date of Service: 2/13/2023    Chief Complaint:   Chief Complaint   Patient presents with    Follow-Up from Hospital       Interim History:   Ms. Marianna Smiley is a 66-year-old female with history of tobacco use still smoking, 1/2 pack per day, anxiety, carotid artery disease, hypertension, CKD, osteoarthritis history of superficial thrombophlebitis, type 2 diabetes on insuline neuropathy and nephropathy. No ETOH use. Patient was admitted to the hospital on 1/4/2023 with leg edema, 20 pound weight gain without any orthopnea, PND or exertional dyspnea, abdominal bloating, early satiety or loss of appetite. She was following with renal service for CKD and for hyperkalemia. She was taking Lasix 20 mg 3 times a day. Was following with Dr. Nicole Neri from renal service for hyperkalemia and CKD. She was seen as a new consult on 1/5/2023 for congestive heart failure. She was noted to have a creatinine of 1.1-4.1. Chest CTA showed bilateral pleural effusions, no pulmonary edema. Venous Dopplers were positive for right calf superficial vein thrombosis which was treated with Lovenox and subsequently changed to Eliquis. Patient was diagnosed with a chronic HFpEF hypertensive urgency, bilateral pleural effusions poorly controlled hypertension, anxiety disorder and CKD stage III, type 2 diabetes with peripheral neuropathy. She was intolerant to ACE inhibitor therapy due to hyperkalemia    She is compliant with medications, as well as salt and fluid intake. She does not take any over-the-counter arthritis medications. She had four ER visits since she was discharged without admissions secondary to multiple episodes of hypoglycemia. Had multiples episodes of hypoglycemic episodes with falls.   Now, she is following with endocrinology service with plans to start on insulin pump and also has continuous glucose monitoring. No cardiac complaints. No smoking. No new cardiac complaints since last cardiology evaluation. He/She denies recent chest pain, SOB, palpitations, lightheadedness, dizziness, syncope, PND, or orthopnea. SR on EKG. Review of Systems:   Cardiac: As per HPI  General: No fever, chills  Pulmonary: As per HPI  HEENT: No visual disturbances, difficult swallowing  GI: No nausea, vomiting  Endocrine: No thyroid disease or DM  Musculoskeletal: ELIAS x 4, no focal motor deficits  Skin: Intact, no rashes  Neuro/Psych: No headache or seizures    Past Medical History:  Past Medical History:   Diagnosis Date    Anxiety     Bilateral carotid artery stenosis 10/7/2021    Bruit of left carotid artery 7/9/2020    Decreased dorsalis pedis pulse 7/9/2020    Humeral fracture     right    Hypertension     Kidney disease     Left carotid stenosis 7/9/2020    Leg pain     Osteoarthritis of right knee 5/4/2011    Superficial phlebitis of leg, right 8/12/2021    Type II or unspecified type diabetes mellitus without mention of complication, not stated as uncontrolled     Varicose veins of leg with pain, right 8/12/2021       Past Surgical History:  Past Surgical History:   Procedure Laterality Date    APPENDECTOMY      CHOLECYSTECTOMY      FRACTURE SURGERY Right     arm    KNEE ARTHROPLASTY Right 2011    Right TKA. Genny Monzon MD       Family History:  Family History   Problem Relation Age of Onset    Diabetes Mother     Arthritis Father     Diabetes Father        Social History:  Social History     Socioeconomic History    Marital status:      Spouse name: Not on file    Number of children: Not on file    Years of education: Not on file    Highest education level: Not on file   Occupational History    Not on file   Tobacco Use    Smoking status: Never    Smokeless tobacco: Never   Vaping Use    Vaping Use: Never used   Substance and Sexual Activity    Alcohol use: Never    Drug use: No    Sexual activity: Not on file   Other Topics Concern    Not on file   Social History Narrative    Not on file     Social Determinants of Health     Financial Resource Strain: Not on file   Food Insecurity: Not on file   Transportation Needs: Not on file   Physical Activity: Not on file   Stress: Not on file   Social Connections: Not on file   Intimate Partner Violence: Not on file   Housing Stability: Not on file       Allergies:   Allergies   Allergen Reactions    Synvisc [Hylan G-F 20]      Local swelling       Current Medications:  Current Outpatient Medications   Medication Sig Dispense Refill    Blood Glucose Monitoring Suppl ([employer] SYSTEM) w/Device KIT TEST THREE TIMES DAILY      insulin lispro, 1 Unit Dial, (HUMALOG KWIKPEN) 100 UNIT/ML SOPN Inject 2 units plus low SS before meals BS  150-200 = 1 units, 201-250 = 2 units, 251-300 = 3 units, 301-350 = 4 units, 351-400 = 5 units, 401 = 6 units 3 Adjustable Dose Pre-filled Pen Syringe 3    Insulin Pen Needle ([employer] PEN NEEDLES) 31G X 6 MM MISC Pt uses insulin injections 4 x a day 150 each 3    insulin detemir (LEVEMIR) 100 UNIT/ML injection vial Inject 15 Units into the skin nightly      Multiple Vitamins-Minerals (CENTRUM SILVER 50+WOMEN) TABS Take by mouth      apixaban (ELIQUIS) 5 MG TABS tablet Take 1 tablet by mouth 2 times daily 60 tablet 1    hydrALAZINE (APRESOLINE) 25 MG tablet Take 1 tablet by mouth every 8 hours (Patient taking differently: Take 25 mg by mouth every 8 hours Usually taken 2 x day) 90 tablet 3    carvedilol (COREG) 6.25 MG tablet Take 1 tablet by mouth 2 times daily (with meals) 60 tablet 3    verapamil (CALAN SR) 180 MG extended release tablet Take 1.5 tablets by mouth daily 30 tablet 3    magnesium oxide (MAG-OX) 400 MG tablet Take 400 mg by mouth 2 times daily      methocarbamol (ROBAXIN) 500 MG tablet Take 500 mg by mouth at bedtime      alprazolam (XANAX) 1 MG tablet   Take 1 mg by mouth 2 times daily Instructed to take am of procedure      furosemide (LASIX) 20 MG tablet Take 20 mg by mouth daily. aspirin 81 MG EC tablet Take 81 mg by mouth daily. LOKELMA 10 g PACK oral suspension Take 10 g by mouth daily (Patient not taking: No sig reported)       No current facility-administered medications for this visit. Physical Exam:  /70   Pulse 70   Resp 18   Ht 5' 5\" (1.651 m)   Wt 151 lb 9.6 oz (68.8 kg)   BMI 25.23 kg/m²   Wt Readings from Last 3 Encounters:   02/13/23 151 lb 9.6 oz (68.8 kg)   02/07/23 153 lb (69.4 kg)   01/31/23 152 lb (68.9 kg)     Appearance: Awake, alert and oriented x 3, no acute respiratory distress  Skin: Intact, no rash  Head: Normocephalic, atraumatic  Eyes: EOMI, no conjunctival erythema  ENMT: No pharyngeal erythema, MMM, no rhinorrhea  Neck: Supple, no elevated JVP, no carotid bruits  Lungs: Clear to auscultation bilaterally. No wheezes, rales, or rhonchi.   Cardiac: Regular rate and rhythm, +S1S2, no murmurs apparent  Abdomen: Soft, nontender, +bowel sounds  Extremities: Moves all extremities x 4, no lower extremity edema  Neurologic: No focal motor deficits apparent, normal mood and affect, alert and oriented x 3  Peripheral Pulses: Intact posterior tibial pulses bilaterally    Laboratory Tests:  Lab Results   Component Value Date    CREATININE 1.2 (H) 02/07/2023    BUN 32 (H) 02/07/2023     02/07/2023    K 6.0 (H) 02/07/2023    CL 95 (L) 02/07/2023    CO2 21 (L) 02/07/2023     Lab Results   Component Value Date/Time    MG 2.2 01/12/2022 04:30 PM     Lab Results   Component Value Date    WBC 5.7 02/06/2023    HGB 10.6 (L) 02/06/2023    HCT 32.2 (L) 02/06/2023    MCV 87.3 02/06/2023     02/06/2023     Lab Results   Component Value Date    ALT 20 02/06/2023    AST 24 02/06/2023    ALKPHOS 103 02/06/2023    BILITOT 0.5 02/06/2023     Lab Results   Component Value Date    TROPONINI 0.01 04/09/2020    TROPONINI <0.01 02/09/2019     No results found for: INR, PROTIME  Lab Results   Component Value Date    TSH 1.880 02/06/2023     Lab Results   Component Value Date    LABA1C 8.4 02/07/2023     No results found for: EAG  No results found for: CHOL  No results found for: TRIG  No results found for: HDL  No results found for: LDLCALC, LDLCHOLESTEROL  No results found for: LABVLDL, VLDL  No results found for: CHOLHDLRATIO    Cardiac Tests:  ECG: Normal sinus rhythm, anteroseptal infarct age undetermined, abnormal EKG. CTA chest-1/4/2023: No pulmonary embolism, bilateral moderate pleural effusions, with bibasilar infiltrate or subsegmental atelectasis small 5 mm nodule     TTE-1/6/2023:  Normal left ventricle size and systolic function. Ejection fraction is visually estimated at 60-65%. No regional wall motion abnormalities seen. Normal left ventricle wall thickness. Indeterminate diastolic function. Normal right ventricular size and function. TAPSE 25 mm. No hemodynamically significant aortic stenosis is present. Physiologic and/or trace tricuspid regurgitation. RVSP is 33 mmHg. Normal estimated PA systolic pressure. No evidence for hemodynamically significant pericardial effusion. Stress test:        Cardiac catheterization:     The ASCVD Risk score (Naty DK, et al., 2019) failed to calculate for the following reasons:    Cannot find a previous HDL lab    Cannot find a previous total cholesterol lab        ASSESSMENT:  Chronic HFpEF, appears euvolemic, echo showed normal LV systolic function with indeterminate diastolic function and elevated LV filling pressures. History of bilateral pleural effusions  Hypertensive urgency, resolved now blood pressure is well controlled  Anxiety disorder  CKD stage III  History of right lower extremity superficial vein thrombosis  History of hyperkalemia  Type 2 diabetes with peripheral neuropathy and nephropathy, poorly controlled.    Mild anemia  History of intolerance to ACE inhibitor therapy due to hyperkalemia    Plan: Continue Eliquis 5 mg p.o. twice daily and was advised to follow-up with Dr. Scarlet Rouse for further management. Continue Coreg 6.25 mg p.o. twice daily along with hydralazine 25 mg p.o. twice daily and verapamil  mg 1.5 tablets p.o. daily for hypertension  Will add atorvastatin 20 mg p.o. daily due to underlying diabetes. Patient was advised to get a lipid panel from her primary care provider  Management of diabetes as per endocrinology service  Continue furosemide 20 mg p.o. daily for leg edema and HFpEF  Follow-up with me in 3 months      The patient's current medication list, allergies, problem list and results of all previously ordered testing were reviewed at today's visit.   Vernon Flannery MD  [facility]) Cardiology

## 2023-02-13 NOTE — PATIENT INSTRUCTIONS
Continue Eliquis 5 mg p.o. twice daily and was advised to follow-up with Dr. Vania Martin for further management. Continue Coreg 6.25 mg p.o. twice daily along with hydralazine 25 mg p.o. twice daily and verapamil  mg 1/2 tablets p.o. daily for hypertension  Will add atorvastatin 20 mg p.o. daily due to underlying diabetes.   Patient was advised to get a lipid panel from her primary care provider  Management of diabetes as per endocrinology service  Continue furosemide 20 mg p.o. daily for leg edema and HFpEF  Follow-up with me in 3 months

## 2023-02-15 ENCOUNTER — HOSPITAL ENCOUNTER (OUTPATIENT)
Dept: DIABETES SERVICES | Age: 68
Setting detail: THERAPIES SERIES
Discharge: HOME | End: 2023-02-15
Payer: MEDICARE

## 2023-02-15 PROCEDURE — G0108 DIAB MANAGE TRN  PER INDIV: HCPCS

## 2023-02-15 SDOH — ECONOMIC STABILITY: FOOD INSECURITY: ADDITIONAL INFORMATION: NO

## 2023-02-15 ASSESSMENT — PROBLEM AREAS IN DIABETES QUESTIONNAIRE (PAID)
PAID-5 TOTAL SCORE: 12
COPING WITH COMPLICATIONS OF DIABETES: 3
WORRYING ABOUT THE FUTURE AND THE POSSIBILITY OF SERIOUS COMPLICATIONS: 2
FEELING THAT DIABETES IS TAKING UP TOO MUCH OF YOUR MENTAL AND PHYSICAL ENERGY EVERY DAY: 2
FEELING DEPRESSED WHEN YOU THINK ABOUT LIVING WITH DIABETES: 1
FEELING SCARED WHEN YOU THINK ABOUT LIVING WITH DIABETES: 4

## 2023-02-15 NOTE — LETTER
[facility] Diabetes Education Department Diabetes Education Letter    2/15/2023       Re:     Sandhya Adame         :  1955  Dear KARLA García                     Thank you for referring your patient, Sandhya Adame, to [facility] diabetes education.   Sandhya Adame has completed their personalized comprehensive education plan.  The education plan included the following topics:          Diabetes disease process & treatment   Healthy Eating  Being Active  Taking Medication  Monitoring Glucose  Acute and Chronic Complications  Lifestyle and Healthy coping  Diabetes Distress and Support       In addition, the PAID -5 (Problem Areas in Diabetes) Survey was completed.  The results From 2/15/2023 were 12  A total score of 8 or greater indicates possible diabetes related emotional distress which warrants further assessment.  [x] Walk-in Behavioral Health Services and Crisis Resource information provided.    Sandhya Adame selected behavioral goal is:    [x]Healthy Eating  []Monitoring  []Problem Solving  []Being Active  []Taking Medication  []Other:     We will send a follow-up letter in 3 months to notify you of their progress.    Thank you for referring this patient to our program.  Please do not hesitate to call if you have any questions at [phone] (SE or [facility]) or [phone] ([facility]).        Sincerely,    [facility] Diabetes Education Department  American Diabetes Association Recognized DSMES Program rev. 10/19/22

## 2023-02-15 NOTE — PROGRESS NOTES
Diabetes Self-Management Education Record    Participant Name: Sandhya Adame  Referring Provider: Edgardo Colon MD  Assessment/Evaluation Ratings:  1=Needs Instruction   4=Demonstrates Understanding/Competency  2=Needs Review   NC=Not Covered    3=Comprehends Key Points  N/A=Not Applicable  Topics/Learning Objectives Pre-session Assess Date:  Instructor initials/date  2/15/23 CS Instr. Date    Instructor initials/date Follow-up Post- session Eval Comments   Diabetes disease process & Treatment process:   -Define type of diabetes in simple terms.  - Describe the ABCs of  diabetes management  -Identify own type of diabetes  -Identify lifestyle changes/treatment options  -other:  1 [x] All     []  []  []  []  []  3 2/15/23 CS  Pt states Type 1 ( not Type 2 per patient)  since 20-21 years old     Developing strategies for Healthy coping/psychosocial issues:    -Describe feelings about living with diabetes  -Identify coping strategies and sources of stress  -Identify support needed & support network available  -Complete PAID-5 Diabetes questionnaire 1 [x] All     []  []  []    []  3 Date: 2/15/23 CS  PAID-5 Score: 12- Pt very tearful and frustrated with meal plan  Lives alone- states niece and friend as support              Prevention, detection & treatment of Chronic complications:    -Identify the prevention, detection and treatment for complications including immunizations, preventive eye, foot, dental and renal exams as indicated per the participant's duration of diabetes and health status.  -Define the natural course of diabetes and the relationship of blood glucose levels to long term complications of diabetes.  1 [x] All     []            []  3    Prevention, detection & treatment of acute complications:    -State the causes,signs & symptoms of hyper & hypoglycemia, and prevention & treatment strategies.   -Describe sick day guidelines  DKA /indications for ketone testing &  when to call physician  1 [x]  All     []      []    3 2/15/23 CS  Pt states frequent episodes of hypo and hyperglycemia- just started CGM  Recent change in insulin dose         -Identify severe weather/situation crisis  & diabetes supplies management  []      Using medications safely:   -State effects of diabetes medicines on blood glucose levels;  -List diabetes medication taken, action & side effects 2 [x] All     []  []  3    Insulin/Injectables/glucagon  -Name appropriate injection sites; proper storage; supplies needed;  2   [x]  3 2/15/23 CS  Levemir 15 units in am 9-930  Humalog 2 units with meals plus low SS    Demonstrate proper technique  []      Monitoring blood glucose, interpreting and using results:   -Identify the purpose of testing   -Identify recommended & personal blood glucose targets & HgbA1C target levels  -State the Importance of logging blood glucose levels for pattern recognition;   -State benefits of reading/using pt generated health data  -Verbalize safe lancet disposal  [] All     []  []    []  []  []   2/15/23 CS  A1C 8.4% 2/7/23  CGM   -Demonstrate proper testing technique  []      Incorporating physical activity into lifestyle:   -State effect of exercise on blood glucose levels;   -State benefits of regular exercise;   -Define safety considerations/food choices if needed.  -Describe contraindications/maintenance of activity.   [] All     []  []    []  []   2/15/23 CS  Pt states active - walking, household duties   Incorporating nutritional management into lifestyle:   -Describe effect of type, amount & timing of food on blood glucose  -Describe methods for preparing and planning   healthy meals  -Correctly read food labels for nutritional values  -Name 3 foods high in Carbohydrate  -Plan a sample 4 carbohydrate-controlled meal using Diabetes Plate Method  -Verbalized ability to measure and count carbohydrate gram servings using food labels and carbohydrate food list.    -Plan a carbohydrate-controlled meal based on individual needs/preferences from a Registered Dietitian.   1 [x] All       []    []    []  []      []        []  3 2/15/23 CS  Pt expresses great frustration with meal plan: tearful   Trying to follow, 1000 mg K, 1500 mg Na  Stage 3 CKD, CHF, DM  Pt keeps food records in notebook  Instructed patient outline for 1400 calorie carbohydrate controlled meal plan:  8 oz lean protein, 5 servings fat, 8-9 carbohydrate choices/day  Outline of 300-350 mg K at breakfast, lunch and dinner- Patient is tracking Na, K and Carb choices in food record. Patient able to read food labels for this information. Breakfast 2 carb choices, lunch 3 carb choices, dinner 2 -3 carb choices, HS snack 1-2 choices depending on dinner. Patient able to plan menu items using meal planning food lists. Developing strategies for problem solving to promote health/change behavior. -Identify 7 self-care behaviors; Personal health risk factors; Benefits, challenges & strategies for behavioral change and set an individualized goal selection. 1       [x]  3 2/15/23 CS   [x]Nutrition  []Monitoring  []Exercise  []Medication  []Other     Identified Barriers to learning/adherence to self management plan:    None  []  other    Instruction Method:  Lecture/Discussion, Handouts, and Return demonstration     Supporting Education Materials/Equipment Provided: Meal Plan and Nutritional Packet   []English materials       [] services     []Other:      Encounter Type Date Attended Start Time End Time Comments No Show Dates   Assessment          Session 1         Session 2        1:1 DSMES  2/15/23 CS 1:45 3:45  In person    In person Follow-up         Gestational Diabetes         DSMES #3        Meter Instrx        Insulin Instrx           Additional Comments: [x] Pt seen individually due to Covid-19 Safety precautions and no group session available.         Date:   Follow-up goal attainment based on patients initial DSMES goal     Notified by [] EMR []Fax        []Post class Hgb A1C  []Medication compliance   []Plate method/meal plan compliance   []Able to state the number of Carbohydrate servings eaten at B,L,D   []Testing blood glucose as prescribed by PCP   []Exercise Routine   []Other:   []Other:     []Patient lost to follow-up  Dr Notified by []EMR []Fax

## 2023-02-27 ENCOUNTER — TELEPHONE (OUTPATIENT)
Dept: ENDOCRINOLOGY | Age: 68
End: 2023-02-27

## 2023-02-28 ENCOUNTER — HOSPITAL ENCOUNTER (OUTPATIENT)
Dept: OTHER | Age: 68
Setting detail: THERAPIES SERIES
Discharge: HOME | End: 2023-02-28
Payer: MEDICARE

## 2023-02-28 VITALS
WEIGHT: 147 LBS | SYSTOLIC BLOOD PRESSURE: 135 MMHG | DIASTOLIC BLOOD PRESSURE: 63 MMHG | RESPIRATION RATE: 18 BRPM | OXYGEN SATURATION: 100 % | HEART RATE: 70 BPM | BODY MASS INDEX: 24.46 KG/M2

## 2023-02-28 LAB
ANION GAP SERPL CALCULATED.3IONS-SCNC: 9 MMOL/L (ref 7–16)
BUN BLDV-MCNC: 27 MG/DL (ref 6–23)
CALCIUM SERPL-MCNC: 9.3 MG/DL (ref 8.6–10.2)
CHLORIDE BLD-SCNC: 101 MMOL/L (ref 98–107)
CO2: 28 MMOL/L (ref 22–29)
CREAT SERPL-MCNC: 1.2 MG/DL (ref 0.5–1)
GFR SERPL CREATININE-BSD FRML MDRD: 49 ML/MIN/1.73
GLUCOSE BLD-MCNC: 226 MG/DL (ref 74–99)
POTASSIUM SERPL-SCNC: 4.5 MMOL/L (ref 3.5–5)
PRO-BNP: 305 PG/ML (ref 0–125)
SODIUM BLD-SCNC: 138 MMOL/L (ref 132–146)

## 2023-02-28 PROCEDURE — 36415 COLL VENOUS BLD VENIPUNCTURE: CPT

## 2023-02-28 PROCEDURE — 83880 ASSAY OF NATRIURETIC PEPTIDE: CPT

## 2023-02-28 PROCEDURE — 80048 BASIC METABOLIC PNL TOTAL CA: CPT

## 2023-02-28 PROCEDURE — 99214 OFFICE O/P EST MOD 30 MIN: CPT

## 2023-02-28 RX ORDER — FLASH GLUCOSE SENSOR
KIT MISCELLANEOUS
Qty: 2 EACH | Refills: 4 | Status: SHIPPED | OUTPATIENT
Start: 2023-02-28

## 2023-02-28 NOTE — PLAN OF CARE
Problem: Chronic Conditions and Co-morbidities  Goal: Patient's chronic conditions and co-morbidity symptoms are monitored and maintained or improved  Flowsheets (Taken 2/28/2023 8585)  Care Plan - Patient's Chronic Conditions and Co-Morbidity Symptoms are Monitored and Maintained or Improved: Monitor and assess patient's chronic conditions and comorbid symptoms for stability, deterioration, or improvement

## 2023-02-28 NOTE — PROGRESS NOTES
Congestive Heart Failure Charbel Madden 12   1955          Referring Provider: DR. Sebastian Barajas  Primary Care Physician: DR. JESUS DUFF  Cardiologist: DR. Sebastian Barajas  Nephrologist: DR. Liliana Mcdonough        History of Present Illness:     Alex Fish is a 79 y.o. female with a history of HFpEF, most recent EF 60-65% ON 1/6/2023. Patient Story:    She does not  have dyspnea with exertion, shortness of breath, or decline in overall functional capacity. She does not have orthopnea, PND, nocturnal cough or hemoptysis. She does not have abdominal distention or bloating, early satiety, anorexia/change in appetite. She does has a good urinary response to  oral diuretic. She does not have  lower extremity edema. She denies lightheadedness, dizziness. She denies palpitations, syncope or near syncope. She does not complain of chest pain, pressure, discomfort. Allergies   Allergen Reactions    Synvisc [Hylan G-F 20]      Local swelling         No outpatient medications have been marked as taking for the 2/28/23 encounter [facility] Encounter) with [facility] ROOM 1.      Current Outpatient Medications on File Prior to Encounter   Medication Sig Dispense Refill    Blood Glucose Monitoring Suppl ([address]) w/Device KIT TEST THREE TIMES DAILY      atorvastatin (LIPITOR) 20 MG tablet Take 1 tablet by mouth daily (Patient not taking: Reported on 2/28/2023) 30 tablet 5    insulin lispro, 1 Unit Dial, (HUMALOG KWIKPEN) 100 UNIT/ML SOPN Inject 2 units plus low SS before meals BS  150-200 = 1 units, 201-250 = 2 units, 251-300 = 3 units, 301-350 = 4 units, 351-400 = 5 units, 401 = 6 units 3 Adjustable Dose Pre-filled Pen Syringe 3    Insulin Pen Needle ([employer] PEN NEEDLES) 31G X 6 MM MISC Pt uses insulin injections 4 x a day 150 each 3    insulin detemir (LEVEMIR) 100 UNIT/ML injection vial Inject 15 Units into the skin every morning (before breakfast)      Multiple Vitamins-Minerals (CENTRUM SILVER 50+WOMEN) TABS Take by mouth      apixaban (ELIQUIS) 5 MG TABS tablet Take 1 tablet by mouth 2 times daily 60 tablet 1    hydrALAZINE (APRESOLINE) 25 MG tablet Take 1 tablet by mouth every 8 hours (Patient taking differently: Take 25 mg by mouth every 8 hours Usually taken 2 x day) 90 tablet 3    carvedilol (COREG) 6.25 MG tablet Take 1 tablet by mouth 2 times daily (with meals) 60 tablet 3    verapamil (CALAN SR) 180 MG extended release tablet Take 1.5 tablets by mouth daily 30 tablet 3    magnesium oxide (MAG-OX) 400 MG tablet Take 400 mg by mouth 2 times daily      methocarbamol (ROBAXIN) 500 MG tablet Take 500 mg by mouth in the morning and at bedtime      alprazolam (XANAX) 1 MG tablet   Take 1 mg by mouth 2 times daily Instructed to take am of procedure      furosemide (LASIX) 20 MG tablet Take 20 mg by mouth daily. aspirin 81 MG EC tablet Take 81 mg by mouth daily. No current facility-administered medications on file prior to encounter. Guideline directed medical:  ARNI/ACE I/ARB: No  Beta blocker:   Yes  Aldosterone antagonist:  No  SGLT2i:  No        Physical Examination:     /63   Pulse 70   Resp 18   Wt 147 lb (66.7 kg)   SpO2 100%   BMI 24.46 kg/m²     Assessment  Charting Type: Shift assessment              HEENT (Head, Ears, Eyes, Nose, & Throat)  HEENT (WDL): Within Defined Limits    Respiratory  Respiratory Depth: Normal  Respiratory Quality/Effort: Unlabored  Chest Assessment: Chest expansion symmetrical  L Breath Sounds: Clear  R Breath Sounds: Clear              Cardiac  Cardiac Regularity: Regular  Cardiac Rhythm: Sinus rhythm    Rhythm Interpretation  Heart Rate: 70         Gastrointestinal  Abdominal (WDL): Within Defined Limits               Peripheral Vascular  Peripheral Vascular (WDL): Within Defined Limits  Edema: None  RLE Edema: None  LLE Edema: None                   Genitourinary  Genitourinary (WDL): Within Defined Limits    Psychosocial  Psychosocial (WDL): Exceptions to WDL  Patient Behaviors: Anxious                        Heart Rate: 70                     LAB DATA:    Last 3 BMP      Sodium (mmol/L)   Date Value   02/07/2023 132   02/06/2023 140   02/06/2023 132     Potassium (mmol/L)   Date Value   02/07/2023 6.0 (H)   02/06/2023 4.8   02/06/2023 5.5 (H)     Chloride (mmol/L)   Date Value   02/07/2023 95 (L)   02/06/2023 108 (H)   02/06/2023 96 (L)     CO2 (mmol/L)   Date Value   02/07/2023 21 (L)   02/06/2023 25   02/06/2023 24     BUN (mg/dL)   Date Value   02/07/2023 32 (H)   02/06/2023 30 (H)   02/06/2023 38 (H)     Glucose (mg/dL)   Date Value   02/07/2023 497 (H)   02/06/2023 150 (H)   02/06/2023 436 (H)   06/08/2011 319 (H)   06/08/2011 101   06/01/2011 250 (H)     Calcium (mg/dL)   Date Value   02/07/2023 9.5   02/06/2023 8.7   02/06/2023 9.3       Last 3 BNP       Pro-BNP (pg/mL)   Date Value   01/31/2023 341 (H)   01/29/2023 285 (H)   01/18/2023 368 (H)          CBC:   No results for input(s): WBC, HGB, PLT in the last 72 hours. BMP:    No results for input(s): NA, K, CL, CO2, BUN, CREATININE, GLUCOSE in the last 72 hours. Hepatic:   No results for input(s): AST, ALT, ALB, BILITOT, ALKPHOS in the last 72 hours. Troponin: No results for input(s): TROPONINI in the last 72 hours. BNP: No results for input(s): BNP in the last 72 hours. Lipids: No results for input(s): CHOL, HDL in the last 72 hours. Invalid input(s): LDLCALCU  INR: No results for input(s): INR in the last 72 hours. WEIGHTS:    Wt Readings from Last 3 Encounters:   02/28/23 147 lb (66.7 kg)   02/13/23 151 lb 9.6 oz (68.8 kg)   02/07/23 153 lb (69.4 kg)         TELEMETRY:  Cardiac Regularity: Regular  Cardiac Rhythm/Interpretation: NSR        ASSESSMENT:  Nikki Adame is evolemic with 5 LB WEIGHT LOSS SINCE 1/31/2023. WILL CALL FOR EARLIER APPT. IF NEEDED.   Interventions completed this visit:  IV diuretics given no  Lab work obtained yes, BMP/BNP  Reviewed currently prescribed medications with patient, educated on importance of compliance and answered any questions regarding their medication  Educated on signs and symptoms of HF  Educated on low sodium diet    PLAN:  Scheduled to follow up in CHF clinic on   Future Appointments   Date Time Provider Florencio Brock   3/10/2023  2:00  [facility] ED CLASSROOM [address] [place]   3/21/2023  3:00  [address], APRN - NP [facility] ENDO [facility]   4/11/2023 12:30 [facility]   5/8/2023  1:20 PM Tammie Neal MD [address] [facility]   5/18/2023  2:00 PM [facility] VAS [facility]   5/18/2023  2:30 PM Teodoro Padilla MD [facility]     Given clinic phone number and aware of signs and symptoms to call with any HF change in symptoms.

## 2023-03-02 ENCOUNTER — TELEPHONE (OUTPATIENT)
Dept: ENDOCRINOLOGY | Age: 68
End: 2023-03-02

## 2023-03-02 DIAGNOSIS — E11.65 TYPE 2 DIABETES MELLITUS WITH HYPERGLYCEMIA, WITH LONG-TERM CURRENT USE OF INSULIN (HCC): Primary | ICD-10-CM

## 2023-03-02 DIAGNOSIS — Z79.4 TYPE 2 DIABETES MELLITUS WITH HYPERGLYCEMIA, WITH LONG-TERM CURRENT USE OF INSULIN (HCC): Primary | ICD-10-CM

## 2023-03-02 RX ORDER — LANCETS
1 EACH MISCELLANEOUS
Qty: 200 EACH | Refills: 11 | Status: SHIPPED | OUTPATIENT
Start: 2023-03-02

## 2023-03-02 RX ORDER — BLOOD SUGAR DIAGNOSTIC
1 STRIP MISCELLANEOUS
Qty: 100 EACH | Refills: 3 | Status: SHIPPED | OUTPATIENT
Start: 2023-03-02

## 2023-03-02 NOTE — TELEPHONE ENCOUNTER
Patient called she still hasnt got the [place] I have explained to her it takes time was only able to leave a message

## 2023-03-30 DIAGNOSIS — Z79.4 TYPE 2 DIABETES MELLITUS WITH HYPERGLYCEMIA, WITH LONG-TERM CURRENT USE OF INSULIN (HCC): Primary | ICD-10-CM

## 2023-03-30 DIAGNOSIS — E11.65 TYPE 2 DIABETES MELLITUS WITH HYPERGLYCEMIA, WITH LONG-TERM CURRENT USE OF INSULIN (HCC): Primary | ICD-10-CM

## 2023-03-30 RX ORDER — BLOOD-GLUCOSE TRANSMITTER
EACH MISCELLANEOUS
Qty: 1 EACH | Refills: 0 | Status: SHIPPED | OUTPATIENT
Start: 2023-03-30

## 2023-03-30 RX ORDER — BLOOD-GLUCOSE SENSOR
EACH MISCELLANEOUS
Qty: 12 EACH | Refills: 3 | Status: SHIPPED | OUTPATIENT
Start: 2023-03-30

## 2023-04-17 ENCOUNTER — HOSPITAL ENCOUNTER (OUTPATIENT)
Dept: OTHER | Age: 68
Setting detail: THERAPIES SERIES
Discharge: HOME | End: 2023-04-17
Payer: MEDICARE

## 2023-04-17 VITALS
DIASTOLIC BLOOD PRESSURE: 68 MMHG | WEIGHT: 146 LBS | OXYGEN SATURATION: 100 % | HEART RATE: 68 BPM | SYSTOLIC BLOOD PRESSURE: 122 MMHG | BODY MASS INDEX: 24.3 KG/M2

## 2023-04-17 LAB
ANION GAP SERPL CALCULATED.3IONS-SCNC: 9 MMOL/L (ref 7–16)
BNP BLD-MCNC: 246 PG/ML (ref 0–125)
BUN SERPL-MCNC: 31 MG/DL (ref 6–23)
CALCIUM SERPL-MCNC: 9.9 MG/DL (ref 8.6–10.2)
CHLORIDE SERPL-SCNC: 105 MMOL/L (ref 98–107)
CO2 SERPL-SCNC: 27 MMOL/L (ref 22–29)
CREAT SERPL-MCNC: 1.2 MG/DL (ref 0.5–1)
GLUCOSE SERPL-MCNC: 97 MG/DL (ref 74–99)
POTASSIUM SERPL-SCNC: 4.8 MMOL/L (ref 3.5–5)
SODIUM SERPL-SCNC: 141 MMOL/L (ref 132–146)

## 2023-04-17 PROCEDURE — 80048 BASIC METABOLIC PNL TOTAL CA: CPT

## 2023-04-17 PROCEDURE — 99214 OFFICE O/P EST MOD 30 MIN: CPT

## 2023-04-17 PROCEDURE — 83880 ASSAY OF NATRIURETIC PEPTIDE: CPT

## 2023-04-17 NOTE — PROGRESS NOTES
Congestive Heart Failure Charbel Madden 12   1955    Referring Provider: DR. Elliot Sánchez  Primary Care Physician: DR. JESUS DUFF  Cardiologist: DR. Elliot Sánchez  Nephrologist: DR. Kya Fry      History of Present Illness:     Rhoda Sam is a 79 y.o. female with a history of HFpEF, most recent EF 60-65% ON 1/6/2023. Patient Story:    She not have dyspnea with exertion, shortness of breath, or decline in overall functional capacity. She does not have orthopnea, PND, nocturnal cough or hemoptysis. She does not have abdominal distention or bloating, early satiety, anorexia/change in appetite. She does have a good urinary response to  oral diuretic. She does not have lower extremity edema. She denies lightheadedness, dizziness. She denies palpitations, syncope or near syncope. She does not complain of chest pain, pressure, discomfort. Allergies   Allergen Reactions    Synvisc [Hylan G-F 20]      Local swelling         No outpatient medications have been marked as taking for the 4/17/23 encounter [facility] Encounter) with [facility] CHF ROOM 1.      Current Outpatient Medications on File Prior to Encounter   Medication Sig Dispense Refill    Continuous Blood Gluc Transmit (GUARDIAN LINK 3 TRANSMITTER) MISC To change once yearly 1 each 0    Continuous Blood Gluc Sensor (GUARDIAN SENSOR 3) MISC To change every 7 days 12 each 3    insulin lispro (HUMALOG) 100 UNIT/ML injection vial 50 u daily via pump 20 mL 5    blood glucose test strips (ACCU-CHEK GUIDE) strip 1 each by In Vitro route 4 times daily (after meals and at bedtime) As needed for pump 100 each 3    Accu-Chek FastClix Lancets MISC 1 each by Does not apply route 4 times daily (after meals and at bedtime) 200 each 11    Continuous Blood Gluc Sensor (FREESTYLE QUOC 2 SENSOR) MISC Change sensor every 14 days 2 each 4    Blood Glucose Monitoring Suppl (ONETOUCH VERIO FLEX SYSTEM) w/Device KIT TEST THREE TIMES DAILY

## 2023-05-03 ENCOUNTER — CLINICAL DOCUMENTATION (OUTPATIENT)
Dept: NUTRITION | Age: 68
End: 2023-05-03

## 2023-05-03 NOTE — PROGRESS NOTES
Initial Assessment      Date: 5/3/23   Time:    Patient Name: Malorie Darby   Referring Clinician: Shayna Moncada MD   Fax: [phone]   Reason for Visit: CHF, CKD, DM    Goals:   Will add a night time snack of  1 carb serving paired with 1 serving of protein or fat    Current Eating Pattern:  24 hr recall:  Breakfast: non-fat yogurt greek yogurt, 1/4 cup coffee w/ sweetener (1 g CHO)  Skipped lunch (usually has swiss cheese grilled sandwich, 2 slices wheat bread)  Snack: PB crackers   Dinner-  33% less sodium tuna packed in water (rinsed), celery, 1 T armenta, 1 biscuit, water  Diet 7-up    Glucose went high after dinner (~286)- took 3 units insulin pen  Shortly after glucose dropped to 69 (took glucose tablet)      Blood glucose dropped low last night - drank regular soda, grape juice, cake squares, chocolates -(kept dropping lower) ate sugar for over an hour before it started going back up      Sex: female  Age: 79  Ht: 5'5\"  CBW: 146# 4/17/23  BMI: 24.30    Medical Hx:  Past Medical History:   Diagnosis Date    Anxiety     Bilateral carotid artery stenosis 10/7/2021    Bruit of left carotid artery 7/9/2020    Decreased dorsalis pedis pulse 7/9/2020    Humeral fracture     right    Hypertension     Kidney disease     Left carotid stenosis 7/9/2020    Leg pain     Osteoarthritis of right knee 5/4/2011    Superficial phlebitis of leg, right 8/12/2021    Type II or unspecified type diabetes mellitus without mention of complication, not stated as uncontrolled     Varicose veins of leg with pain, right 8/12/2021          Family Hx:  Family History   Problem Relation Age of Onset    Diabetes Mother     Arthritis Father     Diabetes Father         Medications:  Current Outpatient Medications on File Prior to Visit   Medication Sig Dispense Refill    Continuous Blood Gluc Transmit (GUARDIAN LINK 3 TRANSMITTER) MISC To change once yearly 1 each 0    Continuous Blood Gluc Sensor (GUARDIAN SENSOR 3) MISC To change [de-identified] : h/o fever, nasal congestion [FreeTextEntry6] : \par 4 year old male with ASD, ADHD, s/p cleft palpate repair presenting for 4 year old vaccines, however, wants to be evaluated for nasal congestion and improving cough. As per mother patient had fever 4 days prior, tmax 101F, resolved for > 72 hrs. Mother had pt COVID swabbed and negative as per mothers report. Patient still has nasal congestion. PO at baseline. Eliminating well. Sore on the mouth. No further concerns.

## 2023-05-05 DIAGNOSIS — I65.23 BILATERAL CAROTID ARTERY STENOSIS: Primary | ICD-10-CM

## 2023-05-08 ENCOUNTER — TELEPHONE (OUTPATIENT)
Dept: CARDIOLOGY CLINIC | Age: 68
End: 2023-05-08

## 2023-05-08 ENCOUNTER — OFFICE VISIT (OUTPATIENT)
Dept: CARDIOLOGY CLINIC | Age: 68
End: 2023-05-08
Payer: MEDICARE

## 2023-05-08 VITALS
BODY MASS INDEX: 24.83 KG/M2 | WEIGHT: 149 LBS | HEART RATE: 71 BPM | DIASTOLIC BLOOD PRESSURE: 60 MMHG | SYSTOLIC BLOOD PRESSURE: 138 MMHG | HEIGHT: 65 IN | RESPIRATION RATE: 12 BRPM

## 2023-05-08 DIAGNOSIS — I50.9 CONGESTIVE HEART FAILURE OF UNKNOWN ETIOLOGY (HCC): Primary | ICD-10-CM

## 2023-05-08 DIAGNOSIS — I82.433 DEEP VEIN THROMBOSIS (DVT) OF POPLITEAL VEIN OF BOTH LOWER EXTREMITIES, UNSPECIFIED CHRONICITY (HCC): ICD-10-CM

## 2023-05-08 PROCEDURE — 1123F ACP DISCUSS/DSCN MKR DOCD: CPT | Performed by: INTERNAL MEDICINE

## 2023-05-08 PROCEDURE — 93000 ELECTROCARDIOGRAM COMPLETE: CPT | Performed by: INTERNAL MEDICINE

## 2023-05-08 PROCEDURE — 99214 OFFICE O/P EST MOD 30 MIN: CPT | Performed by: INTERNAL MEDICINE

## 2023-05-08 NOTE — TELEPHONE ENCOUNTER
Per Dr. Ilana Dejesus, patient needs LE Duplex U/S on Tuesday or Wednesday re: DVT and leg swelling.     U/S (08460)  DVT (I82.433)  Leg swelling (M79.89)

## 2023-05-08 NOTE — PATIENT INSTRUCTIONS
Continue Eliquis 5 mg p.o. twice daily  x one month and stop it on 6/4/23. Rpt venous Doppler to rule out DVT progression due to persistent swelling  Continue Coreg 6.25 mg p.o. twice daily along with hydralazine 25 mg p.o. twice daily and verapamil  mg 1.5 tablets p.o. daily for hypertension  Continue  atorvastatin 20 mg p.o. daily due to underlying diabetes. Patient was advised to get a lipid panel from her primary care provider  Management of diabetes as per endocrinology service  Continue furosemide 20 mg p.o. daily for leg edema and HFpEF  Advised to use compression stockings.    Follow-up with me in 4 months

## 2023-05-08 NOTE — PROGRESS NOTES
OUTPATIENT CARDIOLOGY FOLLOW-UP    Name: Macie Marc    Age: 79 y.o. Primary Care Physician: Nimisha Mckenna MD    Date of Service: 5/8/2023    Chief Complaint:   Chief Complaint   Patient presents with    3 Month Follow-Up    Dizziness    Swelling       Interim History:   Ms. 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 No ETOH use. Patient was admitted to the hospital on 1/4/2023 with leg edema, 20 pound weight gain without any orthopnea, PND or exertional dyspnea, abdominal bloating, early satiety or loss of appetite. She was following with renal service for CKD and for hyperkalemia. She was taking Lasix 20 mg 3 times a day. Was following with Dr. Dewayne Polo from renal service for hyperkalemia and CKD. She was seen as a new consult on 1/5/2023 for congestive heart failure. She was noted to have a creatinine of 1.1-4.1. Chest CTA showed bilateral pleural effusions, no pulmonary edema. Venous Dopplers were positive for right calf superficial vein thrombosis which was treated with Lovenox and subsequently changed to Eliquis. Patient was diagnosed with a chronic HFpEF hypertensive urgency, bilateral pleural effusions poorly controlled hypertension, anxiety disorder and CKD stage III, type 2 diabetes with peripheral neuropathy. She was intolerant to ACE inhibitor therapy due to hyperkalemia    She is compliant with medications, as well as salt and fluid intake. She does not take any over-the-counter arthritis medications. She had four ER visits since she was discharged without admissions secondary to multiple episodes of hypoglycemia. Had multiples episodes of hypoglycemic episodes with falls. Now, she is following with endocrinology service with plans to start on insulin pump and also has continuous glucose monitoring.  No

## 2023-05-09 ENCOUNTER — OFFICE VISIT (OUTPATIENT)
Dept: ENDOCRINOLOGY | Age: 68
End: 2023-05-09
Payer: MEDICARE

## 2023-05-09 VITALS
HEIGHT: 65 IN | HEART RATE: 74 BPM | BODY MASS INDEX: 24.83 KG/M2 | DIASTOLIC BLOOD PRESSURE: 63 MMHG | WEIGHT: 149 LBS | OXYGEN SATURATION: 98 % | SYSTOLIC BLOOD PRESSURE: 119 MMHG

## 2023-05-09 DIAGNOSIS — Z79.4 TYPE 2 DIABETES MELLITUS WITH HYPERGLYCEMIA, WITH LONG-TERM CURRENT USE OF INSULIN (HCC): Primary | ICD-10-CM

## 2023-05-09 DIAGNOSIS — E11.65 TYPE 2 DIABETES MELLITUS WITH HYPERGLYCEMIA, WITH LONG-TERM CURRENT USE OF INSULIN (HCC): Primary | ICD-10-CM

## 2023-05-09 DIAGNOSIS — E11.42 TYPE 2 DIABETES MELLITUS WITH POLYNEUROPATHY (HCC): ICD-10-CM

## 2023-05-09 DIAGNOSIS — Z91.119 DIETARY NONCOMPLIANCE: ICD-10-CM

## 2023-05-09 LAB — HBA1C MFR BLD: 8.2 %

## 2023-05-09 PROCEDURE — 1123F ACP DISCUSS/DSCN MKR DOCD: CPT | Performed by: CLINICAL NURSE SPECIALIST

## 2023-05-09 PROCEDURE — 95251 CONT GLUC MNTR ANALYSIS I&R: CPT | Performed by: CLINICAL NURSE SPECIALIST

## 2023-05-09 PROCEDURE — 99214 OFFICE O/P EST MOD 30 MIN: CPT | Performed by: CLINICAL NURSE SPECIALIST

## 2023-05-09 PROCEDURE — 3052F HG A1C>EQUAL 8.0%<EQUAL 9.0%: CPT | Performed by: CLINICAL NURSE SPECIALIST

## 2023-05-09 PROCEDURE — 83036 HEMOGLOBIN GLYCOSYLATED A1C: CPT | Performed by: CLINICAL NURSE SPECIALIST

## 2023-05-09 RX ORDER — INSULIN DETEMIR 100 [IU]/ML
INJECTION, SOLUTION SUBCUTANEOUS
Qty: 5 EACH | Refills: 4
Start: 2023-05-09

## 2023-05-09 RX ORDER — INSULIN LISPRO 100 [IU]/ML
INJECTION, SOLUTION INTRAVENOUS; SUBCUTANEOUS
Qty: 3 ADJUSTABLE DOSE PRE-FILLED PEN SYRINGE | Refills: 3
Start: 2023-05-09

## 2023-05-09 NOTE — PROGRESS NOTES
[address] [place] [address] Department of Endocrinology Diabetes and Metabolism   [address] [facility] [address]   Phone: [phone]  Fax: [phone]    Date of Service: 5/9/2023    Primary Care Physician: Leti Mcnulty MD  Referring physician: No ref. provider found  Provider: NEWTON Montague - CNS     Reason for the visit:  DM    History of Present Illness: The history is provided by the patient. No  was used. Accuracy of the patient data is excellent. .  She was recently admitted about 1 month ago for CHF and was incidentally found to have a RLE DVT. Juan Reddy is a very pleasant 79 y.o. female seen today for diabetes management     Juan Reddy was diagnosed with diabetes at age 20-22 .  She has been on insulin since dx and currently on Levemir 15 units in am, Hlog 2 units TID with meals plus low dose ISS     Prior to that she was on Humulin Rellion  70/30 18-20 units BID     A1c 8.2%  The patient has been checking blood sugar at least 4 times per day   Ambulatory continuous glucose monitoring of interstitial tissue fluid via a subcutaneous sensor for a minimum of 72 hours; analysis, interpretation and report  Average sensor glucose  Time in range 48%  Hyperglycemia 48%  Hypoglycemia 4      Most recent A1c results summarized below  Lab Results   Component Value Date/Time    LABA1C 8.2 05/09/2023 10:04 AM    LABA1C 8.4 02/07/2023 10:38 AM    LABA1C 9.2 06/08/2011 09:26 AM     Patient reported hypoglycemic episodes since the last month   The patient has been mindful of what has been eating and following diabetic diet as encouraged  She eats 3 meals a day (low K+ and NA+ diet)  For breakfast - eggs with cheese, wheat toast (SF jello)  For lunch low sugar yogurt or applesauce or a croissant ->or Low Na+  Tuna  For dinner 4 oz chicken, broccoli, salads,   I reviewed current medications and the patient has no issues with diabetes medications  Deuce Aadme
deformity, joint pain/swelling, muscle pain, or back pain. Neuro: no numbness, no tingling, no weakness, _    OBJECTIVE    There were no vitals taken for this visit. BP Readings from Last 4 Encounters:   05/08/23 138/60   04/17/23 122/68   02/28/23 135/63   02/13/23 128/70     Wt Readings from Last 6 Encounters:   05/08/23 149 lb (67.6 kg)   04/17/23 146 lb (66.2 kg)   02/28/23 147 lb (66.7 kg)   02/13/23 151 lb 9.6 oz (68.8 kg)   02/07/23 153 lb (69.4 kg)   01/31/23 152 lb (68.9 kg)       Physical examination:  General: awake alert, oriented x3, no abnormal position or movements. HEENT: normocephalic non-traumatic, no exophthalmos   Neck: supple, no LN enlargement, no thyromegaly, no thyroid tenderness, no JVD. Pulm: Clear equal air entry no added sounds, no wheezing or rhonchi    CVS: S1 + S2, no murmur, no heave. Dorsalis pedis pulse palpable   Abd: soft lax, no tenderness, no organomegaly, audible bowel sounds. Skin: warm, no lesions, no rash.  No callus, no Ulcers, No acanthosis nigricans  Musculoskeletal: No back tenderness, no kyphosis/scoliosis    Neuro: CN intact, Monofilament sensation decreased bilateral , muscle power normal  Psych: normal mood, and affect      Review of Laboratory Data:  I personally reviewed the following lab:  Lab Results   Component Value Date/Time    WBC 5.7 02/06/2023 10:53 AM    RBC 3.69 02/06/2023 10:53 AM    HGB 10.6 (L) 02/06/2023 10:53 AM    HCT 32.2 (L) 02/06/2023 10:53 AM    MCV 87.3 02/06/2023 10:53 AM    MCH 28.7 02/06/2023 10:53 AM    MCHC 32.9 02/06/2023 10:53 AM    RDW 13.8 02/06/2023 10:53 AM     02/06/2023 10:53 AM    MPV 10.2 02/06/2023 10:53 AM      Lab Results   Component Value Date/Time     04/17/2023 02:00 PM    K 4.8 04/17/2023 02:00 PM    CO2 27 04/17/2023 02:00 PM    BUN 31 (H) 04/17/2023 02:00 PM    CREATININE 1.2 (H) 04/17/2023 02:00 PM    CALCIUM 9.9 04/17/2023 02:00 PM    LABGLOM 49 04/17/2023 02:00 PM    GFRAA 50 01/12/2022 04:30 PM

## 2023-05-09 NOTE — TELEPHONE ENCOUNTER
Patient notified. Patient given [facility] phone number to call to schedule.

## 2023-05-17 ENCOUNTER — HOSPITAL ENCOUNTER (OUTPATIENT)
Dept: ULTRASOUND IMAGING | Age: 68
Discharge: HOME | End: 2023-05-19
Payer: MEDICARE

## 2023-05-17 DIAGNOSIS — I82.433 DEEP VEIN THROMBOSIS (DVT) OF POPLITEAL VEIN OF BOTH LOWER EXTREMITIES, UNSPECIFIED CHRONICITY (HCC): ICD-10-CM

## 2023-05-17 DIAGNOSIS — I65.23 BILATERAL CAROTID ARTERY STENOSIS: ICD-10-CM

## 2023-05-17 PROCEDURE — 93970 EXTREMITY STUDY: CPT

## 2023-05-17 PROCEDURE — 93880 EXTRACRANIAL BILAT STUDY: CPT

## 2023-05-18 ENCOUNTER — OFFICE VISIT (OUTPATIENT)
Dept: VASCULAR SURGERY | Age: 68
End: 2023-05-18
Payer: MEDICARE

## 2023-05-18 ENCOUNTER — TELEPHONE (OUTPATIENT)
Dept: CARDIOLOGY CLINIC | Age: 68
End: 2023-05-18

## 2023-05-18 VITALS — BODY MASS INDEX: 24.99 KG/M2 | WEIGHT: 150 LBS | HEIGHT: 65 IN

## 2023-05-18 DIAGNOSIS — Z86.718 HISTORY OF DEEP VEIN THROMBOSIS: ICD-10-CM

## 2023-05-18 DIAGNOSIS — I65.23 BILATERAL CAROTID ARTERY STENOSIS: ICD-10-CM

## 2023-05-18 PROCEDURE — 1123F ACP DISCUSS/DSCN MKR DOCD: CPT | Performed by: SURGERY

## 2023-05-18 PROCEDURE — 99213 OFFICE O/P EST LOW 20 MIN: CPT | Performed by: SURGERY

## 2023-05-18 NOTE — PROGRESS NOTES
Family History   Problem Relation Age of Onset    Diabetes Mother     Arthritis Father     Diabetes Father        Social History     Socioeconomic History    Marital status:      Spouse name: Not on file    Number of children: Not on file    Years of education: Not on file    Highest education level: Not on file   Occupational History    Not on file   Tobacco Use    Smoking status: Never    Smokeless tobacco: Never   Vaping Use    Vaping Use: Never used   Substance and Sexual Activity    Alcohol use: Never    Drug use: No    Sexual activity: Not on file   Other Topics Concern    Not on file   Social History Narrative    Not on file     Social Determinants of Health     Financial Resource Strain: Not on file   Food Insecurity: Not on file   Transportation Needs: Not on file   Physical Activity: Not on file   Stress: Not on file   Social Connections: Not on file   Intimate Partner Violence: Not on file   Housing Stability: Not on file       Review of Systems:    Eyes:  No blurring, diplopia or vision loss. Respiratory:  No cough, pleuritic chest pain, dyspnea, or wheezing. Cardiovascular: No angina, palpitations . Musculoskeletal:  No arthritis or weakness. Neurologic:  No paralysis, paresis, paresthesia, seizures or headache. Endocrinology:             Physical Exam:  General appearance:  Alert, awake, oriented x 3. No distress. Eyes:  Conjunctivae appear normal; PERRL  Neck:  No jugular venous distention, lymphadenopathy or thyromegaly. Carotid bruit noted  Lungs:  Clear to ausculation bilaterally. No rhonchi, crackles, wheezes  Heart:  Regular rate and rhythm. No rub or murmur  Abdomen:  Soft, non-tender. No masses, organomegaly. Musculoskeletal : No joint effusions, tenderness swelling    Neuro: Speech is intact. Moving all extremities. No focal motor or sensory deficits      Extremities:  Both feet are warm to touch.  The color of both feet is normal.    With no ankle swelling

## 2023-05-18 NOTE — TELEPHONE ENCOUNTER
----- Message from [facility] sent at 5/17/2023  4:26 PM EDT -----    ----- Message -----  From: Pati Sinha MD  Sent: 5/17/2023   3:39 PM EDT  To: [facility]    No blood clots in her legs based on Doppler study.

## 2023-05-30 ENCOUNTER — HOSPITAL ENCOUNTER (OUTPATIENT)
Dept: OTHER | Age: 68
Setting detail: THERAPIES SERIES
Discharge: HOME | End: 2023-05-30
Payer: MEDICARE

## 2023-05-30 VITALS
WEIGHT: 146 LBS | BODY MASS INDEX: 24.3 KG/M2 | DIASTOLIC BLOOD PRESSURE: 68 MMHG | SYSTOLIC BLOOD PRESSURE: 148 MMHG | OXYGEN SATURATION: 100 % | HEART RATE: 67 BPM

## 2023-05-30 LAB
ANION GAP SERPL CALCULATED.3IONS-SCNC: 12 MMOL/L (ref 7–16)
BNP BLD-MCNC: 300 PG/ML (ref 0–125)
BUN SERPL-MCNC: 42 MG/DL (ref 6–23)
CALCIUM SERPL-MCNC: 9.2 MG/DL (ref 8.6–10.2)
CHLORIDE SERPL-SCNC: 105 MMOL/L (ref 98–107)
CO2 SERPL-SCNC: 24 MMOL/L (ref 22–29)
CREAT SERPL-MCNC: 1.2 MG/DL (ref 0.5–1)
GLUCOSE SERPL-MCNC: 111 MG/DL (ref 74–99)
POTASSIUM SERPL-SCNC: 4.8 MMOL/L (ref 3.5–5)
SODIUM SERPL-SCNC: 141 MMOL/L (ref 132–146)

## 2023-05-30 PROCEDURE — 80048 BASIC METABOLIC PNL TOTAL CA: CPT

## 2023-05-30 PROCEDURE — 83880 ASSAY OF NATRIURETIC PEPTIDE: CPT

## 2023-05-30 PROCEDURE — 99214 OFFICE O/P EST MOD 30 MIN: CPT

## 2023-05-30 PROCEDURE — 36415 COLL VENOUS BLD VENIPUNCTURE: CPT

## 2023-05-30 NOTE — PROGRESS NOTES
5/30/23- 1510- Pt wanted called with K+ level. I called and informed her of 4.8. BUN 42/ cr 1.2. I informed her to drink more fluids to hydrate. She is seeing Dr. Bessie Langston tomorrow for first visit. She is switching from Dr. Jina Jimenez RN. 5/30/23- 1515.
assessment            Respiratory  Respiratory Depth: Normal  Respiratory Quality/Effort: Unlabored  Chest Assessment: Chest expansion symmetrical  L Breath Sounds: Clear  R Breath Sounds: Clear           Cardiac  Cardiac Regularity: Regular  Cardiac Rhythm: Sinus rhythm    Rhythm Interpretation  Heart Rate: 67         Gastrointestinal  Abdominal (WDL): Within Defined Limits               Peripheral Vascular  Peripheral Vascular (WDL): Within Defined Limits  Edema: None                        Psychosocial  Psychosocial (WDL): Exceptions to WDL  Patient Behaviors: Anxious                Heart Rate: 68             LAB DATA:    Last 3 BMP      Sodium (mmol/L)   Date Value   02/28/2023 138   02/07/2023 132   02/06/2023 140     Potassium (mmol/L)   Date Value   02/28/2023 4.5   02/07/2023 6.0 (H)   02/06/2023 4.8     Chloride (mmol/L)   Date Value   02/28/2023 101   02/07/2023 95 (L)   02/06/2023 108 (H)     CO2 (mmol/L)   Date Value   02/28/2023 28   02/07/2023 21 (L)   02/06/2023 25     BUN (mg/dL)   Date Value   02/28/2023 27 (H)   02/07/2023 32 (H)   02/06/2023 30 (H)     Glucose (mg/dL)   Date Value   02/28/2023 226 (H)   02/07/2023 497 (H)   02/06/2023 150 (H)   06/08/2011 319 (H)   06/08/2011 101   06/01/2011 250 (H)     Calcium (mg/dL)   Date Value   02/28/2023 9.3   02/07/2023 9.5   02/06/2023 8.7       Last 3 BNP       Pro-BNP (pg/mL)   Date Value   02/28/2023 305 (H)   01/31/2023 341 (H)   01/29/2023 285 (H)          CBC:   No results for input(s): WBC, HGB, PLT in the last 72 hours. BMP:    No results for input(s): NA, K, CL, CO2, BUN, CREATININE, GLUCOSE in the last 72 hours. Hepatic:   No results for input(s): AST, ALT, ALB, BILITOT, ALKPHOS in the last 72 hours. Troponin: No results for input(s): TROPONINI in the last 72 hours. BNP: No results for input(s): BNP in the last 72 hours. Lipids: No results for input(s): CHOL, HDL in the last 72 hours.     Invalid input(s): LDLCALCU  INR: No results

## 2023-05-30 NOTE — PLAN OF CARE
Problem: Chronic Conditions and Co-morbidities  Goal: Patient's chronic conditions and co-morbidity symptoms are monitored and maintained or improved  Flowsheets (Taken 5/30/2023 4623)  Care Plan - Patient's Chronic Conditions and Co-Morbidity Symptoms are Monitored and Maintained or Improved: Monitor and assess patient's chronic conditions and comorbid symptoms for stability, deterioration, or improvement

## 2023-06-05 ENCOUNTER — HOSPITAL ENCOUNTER (OUTPATIENT)
Age: 68
Discharge: HOME | End: 2023-06-05
Payer: MEDICARE

## 2023-06-05 LAB
ALBUMIN SERPL-MCNC: 4.1 G/DL (ref 3.5–5.2)
ALP SERPL-CCNC: 94 U/L (ref 35–104)
ALT SERPL-CCNC: 16 U/L (ref 0–32)
ANION GAP SERPL CALCULATED.3IONS-SCNC: 12 MMOL/L (ref 7–16)
AST SERPL-CCNC: 22 U/L (ref 0–31)
BACTERIA URNS QL MICRO: NORMAL /HPF
BASOPHILS # BLD: 0.07 E9/L (ref 0–0.2)
BASOPHILS NFR BLD: 1.2 % (ref 0–2)
BILIRUB SERPL-MCNC: 0.4 MG/DL (ref 0–1.2)
BILIRUB UR QL STRIP: NEGATIVE
BUN SERPL-MCNC: 35 MG/DL (ref 6–23)
CALCIUM SERPL-MCNC: 9.3 MG/DL (ref 8.6–10.2)
CHLORIDE SERPL-SCNC: 107 MMOL/L (ref 98–107)
CLARITY UR: CLEAR
CO2 SERPL-SCNC: 26 MMOL/L (ref 22–29)
COLOR UR: YELLOW
CREAT 24H UR-MRATE: 871 MG/24H (ref 720–1510)
CREAT CL 24H UR+SERPL-VRATE: 43.2 ML/MIN (ref 60–114)
CREAT SERPL-MCNC: 1.4 MG/DL (ref 0.5–1)
CREAT SERPL-MCNC: 1.4 MG/DL (ref 0.5–1)
EOSINOPHIL # BLD: 0.31 E9/L (ref 0.05–0.5)
EOSINOPHIL NFR BLD: 5.2 % (ref 0–6)
ERYTHROCYTE [DISTWIDTH] IN BLOOD BY AUTOMATED COUNT: 13.1 FL (ref 11.5–15)
FERRITIN SERPL-MCNC: 45 NG/ML
GLUCOSE SERPL-MCNC: 94 MG/DL (ref 74–99)
GLUCOSE UR STRIP-MCNC: NEGATIVE MG/DL
HCT VFR BLD AUTO: 33 % (ref 34–48)
HGB BLD-MCNC: 10.3 G/DL (ref 11.5–15.5)
HGB UR QL STRIP: NEGATIVE
IMM GRANULOCYTES # BLD: 0.02 E9/L
IMM GRANULOCYTES NFR BLD: 0.3 % (ref 0–5)
IRON SATN MFR SERPL: 21 % (ref 15–50)
IRON SERPL-MCNC: 54 MCG/DL (ref 37–145)
KETONES UR STRIP-MCNC: NEGATIVE MG/DL
LEUKOCYTE ESTERASE UR QL STRIP: NEGATIVE
LYMPHOCYTES # BLD: 1.16 E9/L (ref 1.5–4)
LYMPHOCYTES NFR BLD: 19.6 % (ref 20–42)
Lab: 24 HOURS
MAGNESIUM SERPL-MCNC: 2.7 MG/DL (ref 1.6–2.6)
MCH RBC QN AUTO: 29.3 PG (ref 26–35)
MCHC RBC AUTO-ENTMCNC: 31.2 % (ref 32–34.5)
MCV RBC AUTO: 94 FL (ref 80–99.9)
MONOCYTES # BLD: 0.47 E9/L (ref 0.1–0.95)
MONOCYTES NFR BLD: 7.9 % (ref 2–12)
NEUTROPHILS # BLD: 3.89 E9/L (ref 1.8–7.3)
NEUTS SEG NFR BLD: 65.8 % (ref 43–80)
NITRITE UR QL STRIP: NEGATIVE
PH UR STRIP: 5.5 [PH] (ref 5–9)
PHOSPHATE SERPL-MCNC: 4.5 MG/DL (ref 2.5–4.5)
PLATELET # BLD AUTO: 321 E9/L (ref 130–450)
PMV BLD AUTO: 10.5 FL (ref 7–12)
POTASSIUM 24H UR-SRATE: 35 MMOL/24H (ref 25–125)
POTASSIUM SERPL-SCNC: 4.7 MMOL/L (ref 3.5–5)
PROT 24H UR-MRATE: 0.14 G/24HR (ref 0–0.14)
PROT SERPL-MCNC: 6.8 G/DL (ref 6.4–8.3)
PROT UR STRIP-MCNC: NEGATIVE MG/DL
PTH-INTACT SERPL-MCNC: 53 PG/ML (ref 15–65)
RBC # BLD AUTO: 3.51 E12/L (ref 3.5–5.5)
RBC #/AREA URNS HPF: NORMAL /HPF (ref 0–2)
SODIUM 24H UR-SRATE: 59 MMOL/24 HR (ref 40–220)
SODIUM SERPL-SCNC: 145 MMOL/L (ref 132–146)
SP GR UR STRIP: 1.01 (ref 1–1.03)
SPECIMEN VOL 24H UR: 2025 ML
TIBC SERPL-MCNC: 256 MCG/DL (ref 250–450)
URATE SERPL-MCNC: 5.7 MG/DL (ref 2.4–5.7)
UROBILINOGEN UR STRIP-ACNC: 0.2 E.U./DL
VITAMIN D 25-HYDROXY: 34 NG/ML (ref 30–100)
WBC # BLD: 5.9 E9/L (ref 4.5–11.5)
WBC #/AREA URNS HPF: NORMAL /HPF (ref 0–5)

## 2023-06-05 PROCEDURE — 85025 COMPLETE CBC W/AUTO DIFF WBC: CPT

## 2023-06-05 PROCEDURE — 84156 ASSAY OF PROTEIN URINE: CPT

## 2023-06-05 PROCEDURE — 82306 VITAMIN D 25 HYDROXY: CPT

## 2023-06-05 PROCEDURE — 82575 CREATININE CLEARANCE TEST: CPT

## 2023-06-05 PROCEDURE — 83970 ASSAY OF PARATHORMONE: CPT

## 2023-06-05 PROCEDURE — 83735 ASSAY OF MAGNESIUM: CPT

## 2023-06-05 PROCEDURE — 84133 ASSAY OF URINE POTASSIUM: CPT

## 2023-06-05 PROCEDURE — 84550 ASSAY OF BLOOD/URIC ACID: CPT

## 2023-06-05 PROCEDURE — 84300 ASSAY OF URINE SODIUM: CPT

## 2023-06-05 PROCEDURE — 80053 COMPREHEN METABOLIC PANEL: CPT

## 2023-06-05 PROCEDURE — 36415 COLL VENOUS BLD VENIPUNCTURE: CPT

## 2023-06-05 PROCEDURE — 83540 ASSAY OF IRON: CPT

## 2023-06-05 PROCEDURE — 84100 ASSAY OF PHOSPHORUS: CPT

## 2023-06-05 PROCEDURE — 81001 URINALYSIS AUTO W/SCOPE: CPT

## 2023-06-05 PROCEDURE — 82728 ASSAY OF FERRITIN: CPT

## 2023-06-05 PROCEDURE — 83550 IRON BINDING TEST: CPT

## 2023-06-19 ENCOUNTER — HOSPITAL ENCOUNTER (OUTPATIENT)
Age: 68
Discharge: HOME | End: 2023-06-19
Payer: MEDICARE

## 2023-06-19 LAB
ANION GAP SERPL CALCULATED.3IONS-SCNC: 11 MMOL/L (ref 7–16)
BUN SERPL-MCNC: 28 MG/DL (ref 6–23)
CALCIUM SERPL-MCNC: 9.1 MG/DL (ref 8.6–10.2)
CHLORIDE SERPL-SCNC: 103 MMOL/L (ref 98–107)
CO2 SERPL-SCNC: 26 MMOL/L (ref 22–29)
CREAT SERPL-MCNC: 1.5 MG/DL (ref 0.5–1)
GLUCOSE SERPL-MCNC: 119 MG/DL (ref 74–99)
POTASSIUM SERPL-SCNC: 4.6 MMOL/L (ref 3.5–5)
SODIUM SERPL-SCNC: 140 MMOL/L (ref 132–146)

## 2023-06-19 PROCEDURE — 80048 BASIC METABOLIC PNL TOTAL CA: CPT

## 2023-06-19 PROCEDURE — 36415 COLL VENOUS BLD VENIPUNCTURE: CPT

## 2023-06-27 ENCOUNTER — HOSPITAL ENCOUNTER (OUTPATIENT)
Age: 68
Setting detail: OBSERVATION
Discharge: HOME | End: 2023-06-28
Attending: EMERGENCY MEDICINE | Admitting: FAMILY MEDICINE
Payer: MEDICARE

## 2023-06-27 ENCOUNTER — APPOINTMENT (OUTPATIENT)
Dept: CT IMAGING | Age: 68
End: 2023-06-27
Attending: EMERGENCY MEDICINE
Payer: MEDICARE

## 2023-06-27 ENCOUNTER — APPOINTMENT (OUTPATIENT)
Dept: GENERAL RADIOLOGY | Age: 68
End: 2023-06-27
Payer: MEDICARE

## 2023-06-27 DIAGNOSIS — R91.1 LUNG NODULE: ICD-10-CM

## 2023-06-27 DIAGNOSIS — R07.9 CHEST PAIN, UNSPECIFIED TYPE: Primary | ICD-10-CM

## 2023-06-27 LAB
ALBUMIN SERPL-MCNC: 4.6 G/DL (ref 3.5–5.2)
ALP SERPL-CCNC: 101 U/L (ref 35–104)
ALT SERPL-CCNC: 19 U/L (ref 0–32)
ANION GAP SERPL CALCULATED.3IONS-SCNC: 11 MMOL/L (ref 7–16)
AST SERPL-CCNC: 23 U/L (ref 0–31)
BACTERIA URNS QL MICRO: ABNORMAL /HPF
BASOPHILS # BLD: 0.07 E9/L (ref 0–0.2)
BASOPHILS NFR BLD: 0.9 % (ref 0–2)
BILIRUB SERPL-MCNC: 0.3 MG/DL (ref 0–1.2)
BILIRUB UR QL STRIP: NEGATIVE
BNP BLD-MCNC: 429 PG/ML (ref 0–125)
BUN SERPL-MCNC: 35 MG/DL (ref 6–23)
CALCIUM SERPL-MCNC: 9.7 MG/DL (ref 8.6–10.2)
CHLORIDE SERPL-SCNC: 102 MMOL/L (ref 98–107)
CLARITY UR: CLEAR
CO2 SERPL-SCNC: 27 MMOL/L (ref 22–29)
COLOR UR: YELLOW
CREAT SERPL-MCNC: 1.3 MG/DL (ref 0.5–1)
EOSINOPHIL # BLD: 0.43 E9/L (ref 0.05–0.5)
EOSINOPHIL NFR BLD: 5.6 % (ref 0–6)
ERYTHROCYTE [DISTWIDTH] IN BLOOD BY AUTOMATED COUNT: 13 FL (ref 11.5–15)
GLUCOSE SERPL-MCNC: 149 MG/DL (ref 74–99)
GLUCOSE UR STRIP-MCNC: >=1000 MG/DL
HCT VFR BLD AUTO: 31.6 % (ref 34–48)
HGB BLD-MCNC: 10.2 G/DL (ref 11.5–15.5)
HGB UR QL STRIP: NEGATIVE
IMM GRANULOCYTES # BLD: 0.02 E9/L
IMM GRANULOCYTES NFR BLD: 0.3 % (ref 0–5)
KETONES UR STRIP-MCNC: NEGATIVE MG/DL
LEUKOCYTE ESTERASE UR QL STRIP: NEGATIVE
LIPASE: 27 U/L (ref 13–60)
LYMPHOCYTES # BLD: 1.92 E9/L (ref 1.5–4)
LYMPHOCYTES NFR BLD: 25 % (ref 20–42)
MCH RBC QN AUTO: 29.9 PG (ref 26–35)
MCHC RBC AUTO-ENTMCNC: 32.3 % (ref 32–34.5)
MCV RBC AUTO: 92.7 FL (ref 80–99.9)
METER GLUCOSE: 196 MG/DL (ref 74–99)
MONOCYTES # BLD: 0.51 E9/L (ref 0.1–0.95)
MONOCYTES NFR BLD: 6.6 % (ref 2–12)
NEUTROPHILS # BLD: 4.74 E9/L (ref 1.8–7.3)
NEUTS SEG NFR BLD: 61.6 % (ref 43–80)
NITRITE UR QL STRIP: NEGATIVE
PH UR STRIP: 6.5 [PH] (ref 5–9)
PLATELET # BLD AUTO: 311 E9/L (ref 130–450)
PMV BLD AUTO: 10.1 FL (ref 7–12)
POTASSIUM SERPL-SCNC: 4.3 MMOL/L (ref 3.5–5)
PROT SERPL-MCNC: 7.4 G/DL (ref 6.4–8.3)
PROT UR STRIP-MCNC: NEGATIVE MG/DL
RBC # BLD AUTO: 3.41 E12/L (ref 3.5–5.5)
RBC #/AREA URNS HPF: ABNORMAL /HPF (ref 0–2)
SODIUM SERPL-SCNC: 140 MMOL/L (ref 132–146)
SP GR UR STRIP: 1.01 (ref 1–1.03)
TROPONIN, HIGH SENSITIVITY: 31 NG/L (ref 0–9)
TROPONIN, HIGH SENSITIVITY: 34 NG/L (ref 0–9)
UROBILINOGEN UR STRIP-ACNC: 0.2 E.U./DL
WBC # BLD: 7.7 E9/L (ref 4.5–11.5)
WBC #/AREA URNS HPF: ABNORMAL /HPF (ref 0–5)

## 2023-06-27 PROCEDURE — 2580000003 HC RX 258: Performed by: EMERGENCY MEDICINE

## 2023-06-27 PROCEDURE — 96374 THER/PROPH/DIAG INJ IV PUSH: CPT

## 2023-06-27 PROCEDURE — 83880 ASSAY OF NATRIURETIC PEPTIDE: CPT

## 2023-06-27 PROCEDURE — 6360000002 HC RX W HCPCS: Performed by: EMERGENCY MEDICINE

## 2023-06-27 PROCEDURE — G0378 HOSPITAL OBSERVATION PER HR: HCPCS

## 2023-06-27 PROCEDURE — 99285 EMERGENCY DEPT VISIT HI MDM: CPT

## 2023-06-27 PROCEDURE — 96361 HYDRATE IV INFUSION ADD-ON: CPT

## 2023-06-27 PROCEDURE — 82962 GLUCOSE BLOOD TEST: CPT

## 2023-06-27 PROCEDURE — 6370000000 HC RX 637 (ALT 250 FOR IP): Performed by: EMERGENCY MEDICINE

## 2023-06-27 PROCEDURE — 85025 COMPLETE CBC W/AUTO DIFF WBC: CPT

## 2023-06-27 PROCEDURE — 80053 COMPREHEN METABOLIC PANEL: CPT

## 2023-06-27 PROCEDURE — 71045 X-RAY EXAM CHEST 1 VIEW: CPT

## 2023-06-27 PROCEDURE — 36415 COLL VENOUS BLD VENIPUNCTURE: CPT

## 2023-06-27 PROCEDURE — 74177 CT ABD & PELVIS W/CONTRAST: CPT

## 2023-06-27 PROCEDURE — 2580000003 HC RX 258

## 2023-06-27 PROCEDURE — 83690 ASSAY OF LIPASE: CPT

## 2023-06-27 PROCEDURE — 6370000000 HC RX 637 (ALT 250 FOR IP)

## 2023-06-27 PROCEDURE — 93005 ELECTROCARDIOGRAM TRACING: CPT

## 2023-06-27 PROCEDURE — 84484 ASSAY OF TROPONIN QUANT: CPT

## 2023-06-27 PROCEDURE — 81001 URINALYSIS AUTO W/SCOPE: CPT

## 2023-06-27 PROCEDURE — 6360000004 HC RX CONTRAST MEDICATION: Performed by: RADIOLOGY

## 2023-06-27 PROCEDURE — 71275 CT ANGIOGRAPHY CHEST: CPT

## 2023-06-27 RX ORDER — ASPIRIN 325 MG
325 TABLET ORAL ONCE
Status: COMPLETED | OUTPATIENT
Start: 2023-06-27 | End: 2023-06-27

## 2023-06-27 RX ORDER — SODIUM CHLORIDE 9 MG/ML
INJECTION, SOLUTION INTRAVENOUS CONTINUOUS
Status: DISCONTINUED | OUTPATIENT
Start: 2023-06-27 | End: 2023-06-28 | Stop reason: HOSPADM

## 2023-06-27 RX ORDER — NITROGLYCERIN 0.4 MG/1
0.4 TABLET SUBLINGUAL EVERY 5 MIN PRN
Status: DISCONTINUED | OUTPATIENT
Start: 2023-06-27 | End: 2023-06-28 | Stop reason: HOSPADM

## 2023-06-27 RX ORDER — 0.9 % SODIUM CHLORIDE 0.9 %
1000 INTRAVENOUS SOLUTION INTRAVENOUS ONCE
Status: COMPLETED | OUTPATIENT
Start: 2023-06-27 | End: 2023-06-27

## 2023-06-27 RX ORDER — FENTANYL CITRATE 50 UG/ML
50 INJECTION, SOLUTION INTRAMUSCULAR; INTRAVENOUS ONCE
Status: COMPLETED | OUTPATIENT
Start: 2023-06-27 | End: 2023-06-27

## 2023-06-27 RX ADMIN — SODIUM CHLORIDE: 9 INJECTION, SOLUTION INTRAVENOUS at 23:34

## 2023-06-27 RX ADMIN — IOPAMIDOL 75 ML: 755 INJECTION, SOLUTION INTRAVENOUS at 17:36

## 2023-06-27 RX ADMIN — Medication: at 16:57

## 2023-06-27 RX ADMIN — NITROGLYCERIN 0.4 MG: 0.4 TABLET, ORALLY DISINTEGRATING SUBLINGUAL at 20:55

## 2023-06-27 RX ADMIN — SODIUM CHLORIDE 1000 ML: 9 INJECTION, SOLUTION INTRAVENOUS at 17:04

## 2023-06-27 RX ADMIN — ASPIRIN 325 MG: 325 TABLET ORAL at 16:56

## 2023-06-27 RX ADMIN — FENTANYL CITRATE 50 MCG: 50 INJECTION, SOLUTION INTRAMUSCULAR; INTRAVENOUS at 17:58

## 2023-06-28 ENCOUNTER — APPOINTMENT (OUTPATIENT)
Dept: NON INVASIVE DIAGNOSTICS | Age: 68
End: 2023-06-28
Payer: MEDICARE

## 2023-06-28 ENCOUNTER — APPOINTMENT (OUTPATIENT)
Dept: NUCLEAR MEDICINE | Age: 68
End: 2023-06-28
Payer: MEDICARE

## 2023-06-28 VITALS
OXYGEN SATURATION: 98 % | TEMPERATURE: 97.9 F | BODY MASS INDEX: 24.69 KG/M2 | HEIGHT: 65 IN | RESPIRATION RATE: 16 BRPM | DIASTOLIC BLOOD PRESSURE: 63 MMHG | SYSTOLIC BLOOD PRESSURE: 133 MMHG | WEIGHT: 148.2 LBS | HEART RATE: 93 BPM

## 2023-06-28 LAB
ANION GAP SERPL CALCULATED.3IONS-SCNC: 16 MMOL/L (ref 7–16)
BUN SERPL-MCNC: 29 MG/DL (ref 6–23)
CALCIUM SERPL-MCNC: 8.9 MG/DL (ref 8.6–10.2)
CHLORIDE SERPL-SCNC: 106 MMOL/L (ref 98–107)
CO2 SERPL-SCNC: 20 MMOL/L (ref 22–29)
CREAT SERPL-MCNC: 1.2 MG/DL (ref 0.5–1)
EKG ATRIAL RATE: 70 BPM
EKG ATRIAL RATE: 71 BPM
EKG P AXIS: 61 DEGREES
EKG P AXIS: 76 DEGREES
EKG P-R INTERVAL: 186 MS
EKG P-R INTERVAL: 190 MS
EKG Q-T INTERVAL: 408 MS
EKG Q-T INTERVAL: 412 MS
EKG QRS DURATION: 86 MS
EKG QRS DURATION: 86 MS
EKG QTC CALCULATION (BAZETT): 443 MS
EKG QTC CALCULATION (BAZETT): 444 MS
EKG R AXIS: -10 DEGREES
EKG R AXIS: -2 DEGREES
EKG T AXIS: 66 DEGREES
EKG T AXIS: 70 DEGREES
EKG VENTRICULAR RATE: 70 BPM
EKG VENTRICULAR RATE: 71 BPM
ERYTHROCYTE [DISTWIDTH] IN BLOOD BY AUTOMATED COUNT: 13 FL (ref 11.5–15)
GLUCOSE SERPL-MCNC: 207 MG/DL (ref 74–99)
HCT VFR BLD AUTO: 30.5 % (ref 34–48)
HGB BLD-MCNC: 9.5 G/DL (ref 11.5–15.5)
LV EF: 86 %
LVEF MODALITY: NORMAL
MCH RBC QN AUTO: 29.8 PG (ref 26–35)
MCHC RBC AUTO-ENTMCNC: 31.1 % (ref 32–34.5)
MCV RBC AUTO: 95.6 FL (ref 80–99.9)
METER GLUCOSE: 208 MG/DL (ref 74–99)
METER GLUCOSE: 211 MG/DL (ref 74–99)
METER GLUCOSE: 277 MG/DL (ref 74–99)
PLATELET # BLD AUTO: 263 E9/L (ref 130–450)
PMV BLD AUTO: 10.1 FL (ref 7–12)
POTASSIUM SERPL-SCNC: 5 MMOL/L (ref 3.5–5)
RBC # BLD AUTO: 3.19 E12/L (ref 3.5–5.5)
SODIUM SERPL-SCNC: 142 MMOL/L (ref 132–146)
WBC # BLD: 5.8 E9/L (ref 4.5–11.5)

## 2023-06-28 PROCEDURE — 82962 GLUCOSE BLOOD TEST: CPT

## 2023-06-28 PROCEDURE — 78452 HT MUSCLE IMAGE SPECT MULT: CPT

## 2023-06-28 PROCEDURE — 96361 HYDRATE IV INFUSION ADD-ON: CPT

## 2023-06-28 PROCEDURE — 6370000000 HC RX 637 (ALT 250 FOR IP): Performed by: FAMILY MEDICINE

## 2023-06-28 PROCEDURE — APPSS45 APP SPLIT SHARED TIME 31-45 MINUTES: Performed by: PHYSICIAN ASSISTANT

## 2023-06-28 PROCEDURE — G0378 HOSPITAL OBSERVATION PER HR: HCPCS

## 2023-06-28 PROCEDURE — 93017 CV STRESS TEST TRACING ONLY: CPT

## 2023-06-28 PROCEDURE — 99223 1ST HOSP IP/OBS HIGH 75: CPT | Performed by: INTERNAL MEDICINE

## 2023-06-28 PROCEDURE — 93010 ELECTROCARDIOGRAM REPORT: CPT | Performed by: INTERNAL MEDICINE

## 2023-06-28 PROCEDURE — 78452 HT MUSCLE IMAGE SPECT MULT: CPT | Performed by: INTERNAL MEDICINE

## 2023-06-28 PROCEDURE — 2580000003 HC RX 258: Performed by: EMERGENCY MEDICINE

## 2023-06-28 PROCEDURE — 80048 BASIC METABOLIC PNL TOTAL CA: CPT

## 2023-06-28 PROCEDURE — 93016 CV STRESS TEST SUPVJ ONLY: CPT | Performed by: INTERNAL MEDICINE

## 2023-06-28 PROCEDURE — 85027 COMPLETE CBC AUTOMATED: CPT

## 2023-06-28 PROCEDURE — 3430000000 HC RX DIAGNOSTIC RADIOPHARMACEUTICAL: Performed by: RADIOLOGY

## 2023-06-28 PROCEDURE — 6360000002 HC RX W HCPCS: Performed by: INTERNAL MEDICINE

## 2023-06-28 PROCEDURE — 36415 COLL VENOUS BLD VENIPUNCTURE: CPT

## 2023-06-28 PROCEDURE — 93018 CV STRESS TEST I&R ONLY: CPT | Performed by: INTERNAL MEDICINE

## 2023-06-28 PROCEDURE — A9500 TC99M SESTAMIBI: HCPCS | Performed by: RADIOLOGY

## 2023-06-28 RX ORDER — TETRAKIS(2-METHOXYISOBUTYLISOCYANIDE)COPPER(I) TETRAFLUOROBORATE 1 MG/ML
35 INJECTION, POWDER, LYOPHILIZED, FOR SOLUTION INTRAVENOUS
Status: COMPLETED | OUTPATIENT
Start: 2023-06-28 | End: 2023-06-28

## 2023-06-28 RX ORDER — HYDRALAZINE HYDROCHLORIDE 25 MG/1
25 TABLET, FILM COATED ORAL EVERY 8 HOURS SCHEDULED
Qty: 90 TABLET | Refills: 1 | Status: SHIPPED | OUTPATIENT
Start: 2023-06-28

## 2023-06-28 RX ORDER — LANOLIN ALCOHOL/MO/W.PET/CERES
400 CREAM (GRAM) TOPICAL 2 TIMES DAILY
Status: DISCONTINUED | OUTPATIENT
Start: 2023-06-28 | End: 2023-06-28 | Stop reason: HOSPADM

## 2023-06-28 RX ORDER — CARVEDILOL 6.25 MG/1
6.25 TABLET ORAL 2 TIMES DAILY WITH MEALS
Status: DISCONTINUED | OUTPATIENT
Start: 2023-06-28 | End: 2023-06-28 | Stop reason: HOSPADM

## 2023-06-28 RX ORDER — HYDRALAZINE HYDROCHLORIDE 25 MG/1
25 TABLET, FILM COATED ORAL EVERY 8 HOURS SCHEDULED
Status: DISCONTINUED | OUTPATIENT
Start: 2023-06-28 | End: 2023-06-28 | Stop reason: HOSPADM

## 2023-06-28 RX ORDER — DEXTROSE MONOHYDRATE 100 MG/ML
INJECTION, SOLUTION INTRAVENOUS CONTINUOUS PRN
Status: DISCONTINUED | OUTPATIENT
Start: 2023-06-28 | End: 2023-06-28 | Stop reason: HOSPADM

## 2023-06-28 RX ORDER — TETRAKIS(2-METHOXYISOBUTYLISOCYANIDE)COPPER(I) TETRAFLUOROBORATE 1 MG/ML
11.5 INJECTION, POWDER, LYOPHILIZED, FOR SOLUTION INTRAVENOUS
Status: COMPLETED | OUTPATIENT
Start: 2023-06-28 | End: 2023-06-28

## 2023-06-28 RX ORDER — ASPIRIN 81 MG/1
81 TABLET ORAL DAILY
Status: DISCONTINUED | OUTPATIENT
Start: 2023-06-28 | End: 2023-06-28 | Stop reason: HOSPADM

## 2023-06-28 RX ORDER — NITROGLYCERIN 0.4 MG/1
0.4 TABLET SUBLINGUAL EVERY 5 MIN PRN
Qty: 2 TABLET | Refills: 0 | Status: SHIPPED | OUTPATIENT
Start: 2023-06-28

## 2023-06-28 RX ORDER — INSULIN GLARGINE-YFGN 100 [IU]/ML
8 INJECTION, SOLUTION SUBCUTANEOUS 2 TIMES DAILY
Status: DISCONTINUED | OUTPATIENT
Start: 2023-06-28 | End: 2023-06-28 | Stop reason: HOSPADM

## 2023-06-28 RX ORDER — M-VIT,TX,IRON,MINS/CALC/FOLIC 27MG-0.4MG
1 TABLET ORAL DAILY
Status: DISCONTINUED | OUTPATIENT
Start: 2023-06-28 | End: 2023-06-28 | Stop reason: HOSPADM

## 2023-06-28 RX ORDER — INSULIN LISPRO 100 [IU]/ML
0-4 INJECTION, SOLUTION INTRAVENOUS; SUBCUTANEOUS
Status: DISCONTINUED | OUTPATIENT
Start: 2023-06-28 | End: 2023-06-28 | Stop reason: HOSPADM

## 2023-06-28 RX ORDER — ATORVASTATIN CALCIUM 20 MG/1
20 TABLET, FILM COATED ORAL DAILY
Status: DISCONTINUED | OUTPATIENT
Start: 2023-06-28 | End: 2023-06-28 | Stop reason: HOSPADM

## 2023-06-28 RX ORDER — INSULIN LISPRO 100 [IU]/ML
0-4 INJECTION, SOLUTION INTRAVENOUS; SUBCUTANEOUS NIGHTLY
Status: DISCONTINUED | OUTPATIENT
Start: 2023-06-28 | End: 2023-06-28 | Stop reason: HOSPADM

## 2023-06-28 RX ORDER — FUROSEMIDE 20 MG/1
20 TABLET ORAL DAILY
Status: DISCONTINUED | OUTPATIENT
Start: 2023-06-28 | End: 2023-06-28 | Stop reason: HOSPADM

## 2023-06-28 RX ORDER — METHOCARBAMOL 500 MG/1
500 TABLET, FILM COATED ORAL 2 TIMES DAILY
Status: DISCONTINUED | OUTPATIENT
Start: 2023-06-28 | End: 2023-06-28 | Stop reason: HOSPADM

## 2023-06-28 RX ORDER — ALPRAZOLAM 1 MG/1
1 TABLET ORAL 2 TIMES DAILY
Status: DISCONTINUED | OUTPATIENT
Start: 2023-06-28 | End: 2023-06-28 | Stop reason: HOSPADM

## 2023-06-28 RX ORDER — REGADENOSON 0.08 MG/ML
0.4 INJECTION, SOLUTION INTRAVENOUS
Status: COMPLETED | OUTPATIENT
Start: 2023-06-28 | End: 2023-06-28

## 2023-06-28 RX ADMIN — MULTIPLE VITAMINS W/ MINERALS TAB 1 TABLET: TAB at 09:18

## 2023-06-28 RX ADMIN — Medication 11.5 MILLICURIE: at 11:30

## 2023-06-28 RX ADMIN — Medication 400 MG: at 09:18

## 2023-06-28 RX ADMIN — CARVEDILOL 6.25 MG: 6.25 TABLET, FILM COATED ORAL at 16:53

## 2023-06-28 RX ADMIN — HYDRALAZINE HYDROCHLORIDE 25 MG: 25 TABLET, FILM COATED ORAL at 06:38

## 2023-06-28 RX ADMIN — CARVEDILOL 6.25 MG: 6.25 TABLET, FILM COATED ORAL at 10:38

## 2023-06-28 RX ADMIN — FUROSEMIDE 20 MG: 20 TABLET ORAL at 09:18

## 2023-06-28 RX ADMIN — ASPIRIN 81 MG: 81 TABLET, COATED ORAL at 09:18

## 2023-06-28 RX ADMIN — SODIUM CHLORIDE: 9 INJECTION, SOLUTION INTRAVENOUS at 03:09

## 2023-06-28 RX ADMIN — METHOCARBAMOL 500 MG: 500 TABLET ORAL at 09:18

## 2023-06-28 RX ADMIN — REGADENOSON 0.4 MG: 0.08 INJECTION, SOLUTION INTRAVENOUS at 12:58

## 2023-06-28 RX ADMIN — ALPRAZOLAM 1 MG: 1 TABLET ORAL at 09:18

## 2023-06-28 RX ADMIN — INSULIN LISPRO 2 UNITS: 100 INJECTION, SOLUTION INTRAVENOUS; SUBCUTANEOUS at 16:53

## 2023-06-28 RX ADMIN — ATORVASTATIN CALCIUM 20 MG: 20 TABLET, FILM COATED ORAL at 09:18

## 2023-06-28 RX ADMIN — Medication 33 MILLICURIE: at 13:00

## 2023-06-28 RX ADMIN — HYDRALAZINE HYDROCHLORIDE 25 MG: 25 TABLET, FILM COATED ORAL at 15:49

## 2023-06-28 RX ADMIN — SODIUM CHLORIDE: 9 INJECTION, SOLUTION INTRAVENOUS at 07:40

## 2023-06-28 ASSESSMENT — ENCOUNTER SYMPTOMS
ABDOMINAL PAIN: 0
SHORTNESS OF BREATH: 1

## 2023-07-10 RX ORDER — INSULIN LISPRO 100 [IU]/ML
INJECTION, SOLUTION INTRAVENOUS; SUBCUTANEOUS
Qty: 4 ADJUSTABLE DOSE PRE-FILLED PEN SYRINGE | Refills: 5 | Status: SHIPPED | OUTPATIENT
Start: 2023-07-10

## 2023-07-11 ENCOUNTER — HOSPITAL ENCOUNTER (OUTPATIENT)
Dept: OTHER | Age: 68
Setting detail: THERAPIES SERIES
Discharge: HOME | End: 2023-07-11
Payer: MEDICARE

## 2023-07-11 VITALS
BODY MASS INDEX: 24.13 KG/M2 | HEART RATE: 74 BPM | DIASTOLIC BLOOD PRESSURE: 54 MMHG | WEIGHT: 145 LBS | OXYGEN SATURATION: 98 % | RESPIRATION RATE: 16 BRPM | SYSTOLIC BLOOD PRESSURE: 100 MMHG

## 2023-07-11 LAB
ANION GAP SERPL CALCULATED.3IONS-SCNC: 10 MMOL/L (ref 7–16)
BNP BLD-MCNC: 243 PG/ML (ref 0–125)
BUN SERPL-MCNC: 35 MG/DL (ref 6–23)
CALCIUM SERPL-MCNC: 9.5 MG/DL (ref 8.6–10.2)
CHLORIDE SERPL-SCNC: 103 MMOL/L (ref 98–107)
CO2 SERPL-SCNC: 25 MMOL/L (ref 22–29)
CREAT SERPL-MCNC: 1.4 MG/DL (ref 0.5–1)
GLUCOSE SERPL-MCNC: 257 MG/DL (ref 74–99)
POTASSIUM SERPL-SCNC: 4.9 MMOL/L (ref 3.5–5)
SODIUM SERPL-SCNC: 138 MMOL/L (ref 132–146)

## 2023-07-11 PROCEDURE — 99214 OFFICE O/P EST MOD 30 MIN: CPT

## 2023-07-11 PROCEDURE — 36415 COLL VENOUS BLD VENIPUNCTURE: CPT

## 2023-07-11 PROCEDURE — 83880 ASSAY OF NATRIURETIC PEPTIDE: CPT

## 2023-07-11 PROCEDURE — 80048 BASIC METABOLIC PNL TOTAL CA: CPT

## 2023-07-11 NOTE — PLAN OF CARE
Problem: Chronic Conditions and Co-morbidities  Goal: Patient's chronic conditions and co-morbidity symptoms are monitored and maintained or improved  Flowsheets (Taken 7/11/2023 6564)  Care Plan - Patient's Chronic Conditions and Co-Morbidity Symptoms are Monitored and Maintained or Improved: Monitor and assess patient's chronic conditions and comorbid symptoms for stability, deterioration, or improvement Yes

## 2023-07-11 NOTE — PROGRESS NOTES
Congestive Heart Failure [address]   1955    Referring Provider: DR. Analilia Moore  Primary Care Physician: DR. JESUS DUFF  Cardiologist: DR. Analilia Moore  Nephrologist: 5/30- changed from Dr. Tse>>Dr. Shaun Redd      History of Present Illness:     Becky Preciado is a 79 y.o. female with a history of HFpEF, most recent EF 60-65% ON 1/6/2023. Patient Story:    She not have dyspnea with exertion, shortness of breath, or decline in overall functional capacity. She does not have orthopnea, PND, nocturnal cough or hemoptysis. She does not have abdominal distention or bloating, early satiety, anorexia/change in appetite. She does have a good urinary response to oral diuretic. She does have trace edema in her RLE. She denies lightheadedness, dizziness. She denies palpitations, syncope or near syncope. She does not complain of chest pain, pressure, discomfort. Allergies   Allergen Reactions    Synvisc [Hylan G-F 20]      Local swelling         No outpatient medications have been marked as taking for the 4/17/23 encounter [facility] Encounter) with [facility] ROOM 1.      Current Outpatient Medications on File Prior to Encounter   Medication Sig Dispense Refill    Continuous Blood Gluc Transmit (GUARDIAN LINK 3 TRANSMITTER) MISC To change once yearly 1 each 0    Continuous Blood Gluc Sensor (GUARDIAN SENSOR 3) MISC To change every 7 days 12 each 3    insulin lispro (HUMALOG) 100 UNIT/ML injection vial 50 u daily via pump 20 mL 5    blood glucose test strips (ACCU-CHEK GUIDE) strip 1 each by In Vitro route 4 times daily (after meals and at bedtime) As needed for pump 100 each 3    Accu-Chek FastClix Lancets MISC 1 each by Does not apply route 4 times daily (after meals and at bedtime) 200 each 11    Continuous Blood Gluc Sensor (FREESTYLE QUOC 2 SENSOR) MISC Change sensor every 14 days 2 each 4    Blood Glucose Monitoring Suppl (ONETOUCH VERIO FLEX SYSTEM) w/Device

## 2023-08-03 RX ORDER — ATORVASTATIN CALCIUM 20 MG/1
TABLET, FILM COATED ORAL
Qty: 30 TABLET | Refills: 11 | Status: SHIPPED | OUTPATIENT
Start: 2023-08-03

## 2023-08-08 ENCOUNTER — HOSPITAL ENCOUNTER (OUTPATIENT)
Dept: OTHER | Age: 68
Setting detail: THERAPIES SERIES
Discharge: HOME | End: 2023-08-08
Payer: MEDICARE

## 2023-08-08 VITALS
WEIGHT: 145 LBS | HEART RATE: 67 BPM | SYSTOLIC BLOOD PRESSURE: 142 MMHG | OXYGEN SATURATION: 98 % | DIASTOLIC BLOOD PRESSURE: 60 MMHG | RESPIRATION RATE: 16 BRPM | BODY MASS INDEX: 24.13 KG/M2

## 2023-08-08 LAB
ANION GAP SERPL CALCULATED.3IONS-SCNC: 10 MMOL/L (ref 7–16)
BNP SERPL-MCNC: 437 PG/ML (ref 0–125)
BUN SERPL-MCNC: 29 MG/DL (ref 6–23)
CALCIUM SERPL-MCNC: 9.2 MG/DL (ref 8.6–10.2)
CHLORIDE SERPL-SCNC: 105 MMOL/L (ref 98–107)
CO2 SERPL-SCNC: 26 MMOL/L (ref 22–29)
CREAT SERPL-MCNC: 1.3 MG/DL (ref 0.5–1)
GFR SERPL CREATININE-BSD FRML MDRD: 43 ML/MIN/1.73M2
GLUCOSE SERPL-MCNC: 140 MG/DL (ref 74–99)
POTASSIUM SERPL-SCNC: 4.9 MMOL/L (ref 3.5–5)
SODIUM SERPL-SCNC: 141 MMOL/L (ref 132–146)

## 2023-08-08 PROCEDURE — 99214 OFFICE O/P EST MOD 30 MIN: CPT

## 2023-08-08 PROCEDURE — 83880 ASSAY OF NATRIURETIC PEPTIDE: CPT

## 2023-08-08 PROCEDURE — 80048 BASIC METABOLIC PNL TOTAL CA: CPT

## 2023-08-08 ASSESSMENT — PATIENT HEALTH QUESTIONNAIRE - PHQ9
SUM OF ALL RESPONSES TO PHQ QUESTIONS 1-9: 9
1. LITTLE INTEREST OR PLEASURE IN DOING THINGS: MORE THAN HALF THE DAYS
5. POOR APPETITE OR OVEREATING: NOT AT ALL
6. FEELING BAD ABOUT YOURSELF - OR THAT YOU ARE A FAILURE OR HAVE LET YOURSELF OR YOUR FAMILY DOWN: MORE THAN HALF THE DAYS
10. IF YOU CHECKED OFF ANY PROBLEMS, HOW DIFFICULT HAVE THESE PROBLEMS MADE IT FOR YOU TO DO YOUR WORK, TAKE CARE OF THINGS AT HOME, OR GET ALONG WITH OTHER PEOPLE: NOT DIFFICULT AT ALL
3. TROUBLE FALLING OR STAYING ASLEEP: SEVERAL DAYS
2. FEELING DOWN, DEPRESSED OR HOPELESS: MORE THAN HALF THE DAYS
9. THOUGHTS THAT YOU WOULD BE BETTER OFF DEAD, OR OF HURTING YOURSELF: NOT AT ALL
SUM OF ALL RESPONSES TO PHQ9 QUESTIONS 1 & 2: 4
8. MOVING OR SPEAKING SO SLOWLY THAT OTHER PEOPLE COULD HAVE NOTICED. OR THE OPPOSITE, BEING SO FIGETY OR RESTLESS THAT YOU HAVE BEEN MOVING AROUND A LOT MORE THAN USUAL: NOT AT ALL
7. TROUBLE CONCENTRATING ON THINGS, SUCH AS READING THE NEWSPAPER OR WATCHING TELEVISION: SEVERAL DAYS
4. FEELING TIRED OR HAVING LITTLE ENERGY: SEVERAL DAYS

## 2023-08-08 NOTE — PLAN OF CARE
Problem: Chronic Conditions and Co-morbidities  Goal: Patient's chronic conditions and co-morbidity symptoms are monitored and maintained or improved  Flowsheets (Taken 8/8/2023 1619)  Care Plan - Patient's Chronic Conditions and Co-Morbidity Symptoms are Monitored and Maintained or Improved: Monitor and assess patient's chronic conditions and comorbid symptoms for stability, deterioration, or improvement

## 2023-08-08 NOTE — PROGRESS NOTES
Congestive Heart Failure [address]   1955    Referring Provider: DR. Deanna Liu  Primary Care Physician: DR. JESUS DUFF  Cardiologist: DR. Deanna Liu  Nephrologist: 5/30- changed from Dr. Tse>>Dr. Casey Bronson      History of Present Illness:     Jm Marcus is a 79 y.o. female with a history of HFpEF, most recent EF 60-65% ON 1/6/2023. Patient Story:    She not have dyspnea with exertion, shortness of breath, or decline in overall functional capacity. She does not have orthopnea, PND, nocturnal cough or hemoptysis. She does not have abdominal distention or bloating, early satiety, anorexia/change in appetite. She does have a good urinary response to oral diuretic. She does have trace edema in her RLE. She denies lightheadedness, dizziness. She denies palpitations, syncope or near syncope. She does not complain of chest pain, pressure, discomfort. Allergies   Allergen Reactions    Synvisc [Hylan G-F 20]      Local swelling         No outpatient medications have been marked as taking for the 4/17/23 encounter [facility] Encounter) with [facility] CHF ROOM 1.      Current Outpatient Medications on File Prior to Encounter   Medication Sig Dispense Refill    Continuous Blood Gluc Transmit (GUARDIAN LINK 3 TRANSMITTER) MISC To change once yearly 1 each 0    Continuous Blood Gluc Sensor (GUARDIAN SENSOR 3) MISC To change every 7 days 12 each 3    insulin lispro (HUMALOG) 100 UNIT/ML injection vial 50 u daily via pump 20 mL 5    blood glucose test strips (ACCU-CHEK GUIDE) strip 1 each by In Vitro route 4 times daily (after meals and at bedtime) As needed for pump 100 each 3    Accu-Chek FastClix Lancets MISC 1 each by Does not apply route 4 times daily (after meals and at bedtime) 200 each 11    Continuous Blood Gluc Sensor (FREESTYLE QUOC 2 SENSOR) MISC Change sensor every 14 days 2 each 4    Blood Glucose Monitoring Suppl (ONETOUCH VERIO FLEX SYSTEM) w/Device

## 2023-08-09 DIAGNOSIS — Z79.4 TYPE 2 DIABETES MELLITUS WITH HYPERGLYCEMIA, WITH LONG-TERM CURRENT USE OF INSULIN (HCC): Primary | ICD-10-CM

## 2023-08-09 DIAGNOSIS — E11.65 TYPE 2 DIABETES MELLITUS WITH HYPERGLYCEMIA, WITH LONG-TERM CURRENT USE OF INSULIN (HCC): Primary | ICD-10-CM

## 2023-08-29 ENCOUNTER — OFFICE VISIT (OUTPATIENT)
Dept: CARDIOLOGY CLINIC | Age: 68
End: 2023-08-29
Payer: MEDICARE

## 2023-08-29 VITALS
RESPIRATION RATE: 18 BRPM | HEART RATE: 72 BPM | BODY MASS INDEX: 24.03 KG/M2 | DIASTOLIC BLOOD PRESSURE: 60 MMHG | SYSTOLIC BLOOD PRESSURE: 132 MMHG | HEIGHT: 65 IN | WEIGHT: 144.2 LBS

## 2023-08-29 DIAGNOSIS — I50.9 CONGESTIVE HEART FAILURE OF UNKNOWN ETIOLOGY (HCC): Primary | ICD-10-CM

## 2023-08-29 PROCEDURE — 99214 OFFICE O/P EST MOD 30 MIN: CPT | Performed by: INTERNAL MEDICINE

## 2023-08-29 PROCEDURE — 1123F ACP DISCUSS/DSCN MKR DOCD: CPT | Performed by: INTERNAL MEDICINE

## 2023-08-29 PROCEDURE — 93000 ELECTROCARDIOGRAM COMPLETE: CPT | Performed by: INTERNAL MEDICINE

## 2023-08-29 RX ORDER — INSULIN HUMAN 100 [IU]/ML
INJECTION, SUSPENSION SUBCUTANEOUS
COMMUNITY
Start: 2023-08-10

## 2023-08-29 NOTE — PROGRESS NOTES
OUTPATIENT CARDIOLOGY FOLLOW-UP    Name: Gayle Berger    Age: 76 y.o. Primary Care Physician: Radha Brantley MD    Date of Service: 8/29/2023    Chief Complaint:   Chief Complaint   Patient presents with    Follow-up     4 month ov       Interim History:   Ms. 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 No ETOH use. Patient was admitted to the hospital on 1/4/2023 with leg edema, 20 pound weight gain without any orthopnea, PND or exertional dyspnea, abdominal bloating, early satiety or loss of appetite. She was following with renal service for CKD and for hyperkalemia. She was taking Lasix 20 mg 3 times a day. Was following with Dr. James Mai from renal service for hyperkalemia and CKD. She was seen as a new consult on 1/5/2023 for congestive heart failure. She was noted to have a creatinine of 1.1-4.1. Chest CTA showed bilateral pleural effusions, no pulmonary edema. Venous Dopplers were positive for right calf superficial vein thrombosis which was treated with Lovenox and subsequently changed to Eliquis. Patient was diagnosed with a chronic HFpEF hypertensive urgency, bilateral pleural effusions poorly controlled hypertension, anxiety disorder and CKD stage III, type 2 diabetes with peripheral neuropathy. She was intolerant to ACE inhibitor therapy due to hyperkalemia    She is compliant with medications, as well as salt and fluid intake. She does not take any over-the-counter arthritis medications. She had four ER visits since she was discharged without admissions secondary to multiple episodes of hypoglycemia. Had multiples episodes of hypoglycemic episodes with falls. Now, she is following with endocrinology service with plans to start on insulin pump and also has continuous glucose monitoring.  No cardiac

## 2023-08-29 NOTE — PATIENT INSTRUCTIONS
Continue Coreg 6.25 mg p.o. twice daily along with hydralazine 25 mg p.o. twice daily and verapamil  mg 1.5 tablets p.o. daily for hypertension  Continue  atorvastatin 20 mg p.o. daily due to underlying diabetes. Management of diabetes as per endocrinology service  Continue furosemide 20 mg p.o. daily for leg edema and HFpEF  Advised to use compression stockings.    Follow-up with me in 6 months

## 2023-09-11 ENCOUNTER — OFFICE VISIT (OUTPATIENT)
Dept: ENDOCRINOLOGY | Age: 68
End: 2023-09-11

## 2023-09-11 VITALS — BODY MASS INDEX: 23.46 KG/M2 | WEIGHT: 146 LBS | HEIGHT: 66 IN | RESPIRATION RATE: 18 BRPM

## 2023-09-11 DIAGNOSIS — E11.65 TYPE 2 DIABETES MELLITUS WITH HYPERGLYCEMIA, WITH LONG-TERM CURRENT USE OF INSULIN (HCC): Primary | ICD-10-CM

## 2023-09-11 DIAGNOSIS — Z91.119 DIETARY NONCOMPLIANCE: ICD-10-CM

## 2023-09-11 DIAGNOSIS — Z79.4 TYPE 2 DIABETES MELLITUS WITH HYPERGLYCEMIA, WITH LONG-TERM CURRENT USE OF INSULIN (HCC): Primary | ICD-10-CM

## 2023-09-11 DIAGNOSIS — E11.42 TYPE 2 DIABETES MELLITUS WITH POLYNEUROPATHY (HCC): ICD-10-CM

## 2023-09-11 DIAGNOSIS — N18.31 STAGE 3A CHRONIC KIDNEY DISEASE (HCC): ICD-10-CM

## 2023-09-11 LAB — HBA1C MFR BLD: 7.3 %

## 2023-09-11 RX ORDER — BLOOD-GLUCOSE SENSOR
EACH MISCELLANEOUS
Qty: 2 EACH | Refills: 3 | Status: SHIPPED | OUTPATIENT
Start: 2023-09-11

## 2023-09-19 ENCOUNTER — HOSPITAL ENCOUNTER (OUTPATIENT)
Dept: OTHER | Age: 68
Setting detail: THERAPIES SERIES
Discharge: HOME | End: 2023-09-19
Payer: MEDICARE

## 2023-09-19 ENCOUNTER — HOSPITAL ENCOUNTER (OUTPATIENT)
Age: 68
Discharge: HOME | End: 2023-09-19
Payer: MEDICARE

## 2023-09-19 VITALS
BODY MASS INDEX: 24.25 KG/M2 | HEART RATE: 76 BPM | DIASTOLIC BLOOD PRESSURE: 64 MMHG | WEIGHT: 148 LBS | SYSTOLIC BLOOD PRESSURE: 144 MMHG | RESPIRATION RATE: 18 BRPM | OXYGEN SATURATION: 99 %

## 2023-09-19 LAB
25(OH)D3 SERPL-MCNC: 40.9 NG/ML (ref 30–100)
ALBUMIN SERPL-MCNC: 4.4 G/DL (ref 3.5–5.2)
ALP SERPL-CCNC: 92 U/L (ref 35–104)
ALT SERPL-CCNC: 18 U/L (ref 0–32)
ANION GAP SERPL CALCULATED.3IONS-SCNC: 15 MMOL/L (ref 7–16)
AST SERPL-CCNC: 23 U/L (ref 0–31)
BILIRUB SERPL-MCNC: 0.4 MG/DL (ref 0–1.2)
BUN SERPL-MCNC: 28 MG/DL (ref 6–23)
CALCIUM SERPL-MCNC: 9.7 MG/DL (ref 8.6–10.2)
CHLORIDE SERPL-SCNC: 105 MMOL/L (ref 98–107)
CO2 SERPL-SCNC: 25 MMOL/L (ref 22–29)
CREAT SERPL-MCNC: 1.2 MG/DL (ref 0.5–1)
ERYTHROCYTE [DISTWIDTH] IN BLOOD BY AUTOMATED COUNT: 13.4 % (ref 11.5–15)
FERRITIN SERPL-MCNC: 49 NG/ML
GFR SERPL CREATININE-BSD FRML MDRD: 48 ML/MIN/1.73M2
GLUCOSE SERPL-MCNC: 43 MG/DL (ref 74–99)
HCT VFR BLD AUTO: 36.5 % (ref 34–48)
HGB BLD-MCNC: 11.1 G/DL (ref 11.5–15.5)
IRON SATN MFR SERPL: 20 % (ref 15–50)
IRON SERPL-MCNC: 68 UG/DL (ref 37–145)
MAGNESIUM SERPL-MCNC: 2.5 MG/DL (ref 1.6–2.6)
MCH RBC QN AUTO: 28.8 PG (ref 26–35)
MCHC RBC AUTO-ENTMCNC: 30.4 G/DL (ref 32–34.5)
MCV RBC AUTO: 94.8 FL (ref 80–99.9)
PLATELET # BLD AUTO: 279 K/UL (ref 130–450)
PMV BLD AUTO: 10.7 FL (ref 7–12)
POTASSIUM SERPL-SCNC: 4.4 MMOL/L (ref 3.5–5)
PROT SERPL-MCNC: 6.8 G/DL (ref 6.4–8.3)
RBC # BLD AUTO: 3.85 M/UL (ref 3.5–5.5)
SODIUM SERPL-SCNC: 145 MMOL/L (ref 132–146)
TIBC SERPL-MCNC: 338 UG/DL (ref 250–450)
WBC OTHER # BLD: 6 K/UL (ref 4.5–11.5)

## 2023-09-19 PROCEDURE — 83550 IRON BINDING TEST: CPT

## 2023-09-19 PROCEDURE — 36415 COLL VENOUS BLD VENIPUNCTURE: CPT

## 2023-09-19 PROCEDURE — 83540 ASSAY OF IRON: CPT

## 2023-09-19 PROCEDURE — 83735 ASSAY OF MAGNESIUM: CPT

## 2023-09-19 PROCEDURE — 82728 ASSAY OF FERRITIN: CPT

## 2023-09-19 PROCEDURE — 85027 COMPLETE CBC AUTOMATED: CPT

## 2023-09-19 PROCEDURE — 83880 ASSAY OF NATRIURETIC PEPTIDE: CPT

## 2023-09-19 PROCEDURE — 82306 VITAMIN D 25 HYDROXY: CPT

## 2023-09-19 PROCEDURE — 99214 OFFICE O/P EST MOD 30 MIN: CPT

## 2023-09-19 PROCEDURE — 80053 COMPREHEN METABOLIC PANEL: CPT

## 2023-09-19 NOTE — PROGRESS NOTES
Congestive Heart Failure [address]       Referring Provider: -  Primary Care Physician: Kevin Grant MD   Cardiologist: Dr. Dez Dozier  Nephrologist: Dr. Curtis Teixeira:     Arleen Styles is a 76 y.o. (1955) female with a history of HFpEF, most recent EF:  Lab Results   Component Value Date    LVEF 86 06/28/2023         She presents to the CHF clinic for ongoing evaluation and monitoring of heart failure.     In the CHF clinic today she denies any adverse symptoms except:  [] Dizziness or lightheadedness   [] Syncope or near syncope  [] Recent Fall  [] Shortness of breath at rest   [] Dyspnea with exertion  [] Decline in functional capacity (unable to perform activities they had previously been able to do)  [] Fatigue   [] Orthopnea  [] PND  [] Nocturnal cough  [] Hemoptysis  [] Chest pain, pressure, or discomfort  [] Palpitations  [] Abdominal distention  [] Abdominal bloating  [] Early satiety  [] Blood in stool   [] Diarrhea  [] Constipation  [] Nausea/Vomiting  [] Decreased urinary response to oral diuretic   [] Scrotal swelling   [] Lower extremity edema  [] Used PRN doses of oral diuretic   [] Weight gain             9/19/23- Denies    Wt Readings from Last 3 Encounters:   09/19/23 148 lb (67.1 kg)   09/11/23 146 lb (66.2 kg)   08/29/23 144 lb 3.2 oz (65.4 kg)           SOCIAL HISTORY:  [x] Denies tobacco, alcohol or illicit drug abuse  [] Tobacco use:  [] ETOH use:  [] Illicit drug use:        MEDICATIONS:    Allergies   Allergen Reactions    Synvisc [Hylan G-F 20]      Local swelling       Current Outpatient Medications:     Continuous Blood Gluc Sensor (FREESTYLE QUOC 3 SENSOR) MISC, Apply every 14 days, Disp: 2 each, Rfl: 3    HUMULIN 70/30 (70-30) 100 UNIT/ML injection vial, INJECT 18 TO 20 UNITS TWICE DAILY AS NEEDED, Disp: , Rfl:     Continuous Blood Gluc Sensor (FREESTYLE QUOC 2 SENSOR) MISC, Change sensor every 14 days,

## 2023-09-19 NOTE — PLAN OF CARE
Problem: Chronic Conditions and Co-morbidities  Goal: Patient's chronic conditions and co-morbidity symptoms are monitored and maintained or improved  Flowsheets (Taken 9/19/2023 2672)  Care Plan - Patient's Chronic Conditions and Co-Morbidity Symptoms are Monitored and Maintained or Improved: Monitor and assess patient's chronic conditions and comorbid symptoms for stability, deterioration, or improvement

## 2023-09-19 NOTE — PROGRESS NOTES
9/19/23- 3830- I left a voice message with pt to call the [facility]F Clinic, bs low 43. She returned call she states she checked her bs before our appt and is was 79 and then 108. Daisy Stewart RN  2518    I spoke with lab at [phone]. Pt had blood work prior to this appt. I asked if they could blook in lab a BNP. They are going to a BNP off her blood work. Daisy Stewart RN.

## 2023-09-20 LAB — BNP SERPL-MCNC: 511 PG/ML (ref 0–125)

## 2023-09-20 NOTE — PROGRESS NOTES
9/20/23 1500-    I spoke with Jensen Tao from lab and will run the BNP which was not done yesterday when I called to blood in lab since pt had blood work drawn in lab right before her appt in the [address] [place] [address]. Daisy Stewart RN.     _________________________  9/19/23- 1440- I left a voice message with pt to call the CHF Clinic, bs low 43. She returned call she states she checked her bs before our appt and is was 79 and then 108. Daisy Stewart RN  1957     I spoke with lab at [phone]. Pt had blood work prior to this appt. I asked if they could blook in lab a BNP. They are going to a BNP off her blood work. Daisy Stewart RN.

## 2023-09-28 DIAGNOSIS — E11.65 TYPE 2 DIABETES MELLITUS WITH HYPERGLYCEMIA, WITH LONG-TERM CURRENT USE OF INSULIN (HCC): Primary | ICD-10-CM

## 2023-09-28 DIAGNOSIS — Z79.4 TYPE 2 DIABETES MELLITUS WITH HYPERGLYCEMIA, WITH LONG-TERM CURRENT USE OF INSULIN (HCC): Primary | ICD-10-CM

## 2023-10-22 ENCOUNTER — HOSPITAL ENCOUNTER (EMERGENCY)
Age: 68
Discharge: HOME | End: 2023-10-22
Payer: MEDICARE

## 2023-10-22 VITALS
TEMPERATURE: 97.6 F | BODY MASS INDEX: 23.93 KG/M2 | DIASTOLIC BLOOD PRESSURE: 47 MMHG | HEART RATE: 75 BPM | SYSTOLIC BLOOD PRESSURE: 119 MMHG | WEIGHT: 146 LBS | OXYGEN SATURATION: 100 % | RESPIRATION RATE: 16 BRPM

## 2023-10-22 DIAGNOSIS — T63.444A BEE STING, UNDETERMINED INTENT, INITIAL ENCOUNTER: Primary | ICD-10-CM

## 2023-10-22 PROCEDURE — 99283 EMERGENCY DEPT VISIT LOW MDM: CPT

## 2023-10-22 RX ORDER — FAMOTIDINE 20 MG/1
20 TABLET, FILM COATED ORAL 2 TIMES DAILY
Qty: 60 TABLET | Refills: 0 | Status: SHIPPED | OUTPATIENT
Start: 2023-10-22

## 2023-10-22 RX ORDER — DIPHENHYDRAMINE HCL 25 MG
25 CAPSULE ORAL EVERY 6 HOURS PRN
Qty: 20 CAPSULE | Refills: 0 | Status: SHIPPED | OUTPATIENT
Start: 2023-10-22 | End: 2023-11-01

## 2023-10-22 RX ORDER — TRIAMCINOLONE ACETONIDE 1 MG/G
CREAM TOPICAL
Qty: 30 G | Refills: 0 | Status: SHIPPED | OUTPATIENT
Start: 2023-10-22

## 2023-10-23 NOTE — ED PROVIDER NOTES
[address] ENCOUNTER        Pt Name: Cheyenne Lopez  MRN: [id number]  [address] [facility] [place] 1955  Date of evaluation: 10/22/2023  Provider: NEWTON Lilly CNP  PCP: Luisana Ji MD  Note Started: 10:39 PM EDT 10/22/23      CHICO. I have evaluated this patient. CHIEF COMPLAINT       Chief Complaint   Patient presents with    Insect Bite     Patient c/o bee sting on chest  two days        HISTORY OF PRESENT ILLNESS: 1 or more Elements     History from : Patient    Limitations to history : None    Cheyenne Lopez is a 76 y.o. female who presents emergency department for a bee sting occurring 2 days ago. Patient states that she was getting in her car she went to put her seatbelt over her chest and there was a yellow jacket on her seatbelt. She states that she felt 1 pinch then she states that she saw a yellow jacket on her windshield. Patient states that she has tried multiple over-the-counter topical medications for her itching she states that it is extremely itchy she states that there has been no difficulty breathing chewing or swallowing denies any disseminated symptoms including tongue swelling or hives. Patient states that she is not anaphylactic to bee stings. She does not carry an EpiPen. Patient states that she has not tried any Benadryl. Patient states that the more she scratches the area of the worse her itching gets. She states that hot water makes her symptoms worse. Nursing Notes were all reviewed and agreed with or any disagreements were addressed in the HPI. REVIEW OF SYSTEMS :      Review of Systems   All other systems reviewed and are negative. Positives and Pertinent negatives as per HPI. SURGICAL HISTORY     Past Surgical History:   Procedure Laterality Date    APPENDECTOMY      CHOLECYSTECTOMY      FRACTURE SURGERY Right     arm    KNEE ARTHROPLASTY Right 2011    Right TKA.   Patria Alfonso

## 2023-11-20 ENCOUNTER — HOSPITAL ENCOUNTER (OUTPATIENT)
Dept: OTHER | Age: 68
Setting detail: THERAPIES SERIES
Discharge: HOME | End: 2023-11-20
Payer: MEDICARE

## 2023-11-20 VITALS
DIASTOLIC BLOOD PRESSURE: 67 MMHG | WEIGHT: 146.1 LBS | SYSTOLIC BLOOD PRESSURE: 144 MMHG | BODY MASS INDEX: 23.94 KG/M2 | RESPIRATION RATE: 16 BRPM | HEART RATE: 68 BPM | OXYGEN SATURATION: 100 %

## 2023-11-20 DIAGNOSIS — I65.23 BILATERAL CAROTID ARTERY STENOSIS: Primary | ICD-10-CM

## 2023-11-20 LAB
ANION GAP SERPL CALCULATED.3IONS-SCNC: 14 MMOL/L (ref 7–16)
BNP SERPL-MCNC: 242 PG/ML (ref 0–125)
BUN SERPL-MCNC: 32 MG/DL (ref 6–23)
CALCIUM SERPL-MCNC: 9.1 MG/DL (ref 8.6–10.2)
CHLORIDE SERPL-SCNC: 102 MMOL/L (ref 98–107)
CO2 SERPL-SCNC: 22 MMOL/L (ref 22–29)
CREAT SERPL-MCNC: 1.2 MG/DL (ref 0.5–1)
GFR SERPL CREATININE-BSD FRML MDRD: 49 ML/MIN/1.73M2
GLUCOSE SERPL-MCNC: 76 MG/DL (ref 74–99)
POTASSIUM SERPL-SCNC: 4.6 MMOL/L (ref 3.5–5)
SODIUM SERPL-SCNC: 138 MMOL/L (ref 132–146)

## 2023-11-20 PROCEDURE — 83880 ASSAY OF NATRIURETIC PEPTIDE: CPT

## 2023-11-20 PROCEDURE — 80048 BASIC METABOLIC PNL TOTAL CA: CPT

## 2023-11-20 PROCEDURE — 99214 OFFICE O/P EST MOD 30 MIN: CPT

## 2023-11-20 ASSESSMENT — PATIENT HEALTH QUESTIONNAIRE - PHQ9
1. LITTLE INTEREST OR PLEASURE IN DOING THINGS: NEARLY EVERY DAY
8. MOVING OR SPEAKING SO SLOWLY THAT OTHER PEOPLE COULD HAVE NOTICED. OR THE OPPOSITE, BEING SO FIGETY OR RESTLESS THAT YOU HAVE BEEN MOVING AROUND A LOT MORE THAN USUAL: NOT AT ALL
5. POOR APPETITE OR OVEREATING: NOT AT ALL
SUM OF ALL RESPONSES TO PHQ9 QUESTIONS 1 & 2: 6
4. FEELING TIRED OR HAVING LITTLE ENERGY: MORE THAN HALF THE DAYS
9. THOUGHTS THAT YOU WOULD BE BETTER OFF DEAD, OR OF HURTING YOURSELF: NOT AT ALL
SUM OF ALL RESPONSES TO PHQ QUESTIONS 1-9: 13
10. IF YOU CHECKED OFF ANY PROBLEMS, HOW DIFFICULT HAVE THESE PROBLEMS MADE IT FOR YOU TO DO YOUR WORK, TAKE CARE OF THINGS AT HOME, OR GET ALONG WITH OTHER PEOPLE: NOT DIFFICULT AT ALL
6. FEELING BAD ABOUT YOURSELF - OR THAT YOU ARE A FAILURE OR HAVE LET YOURSELF OR YOUR FAMILY DOWN: MORE THAN HALF THE DAYS
3. TROUBLE FALLING OR STAYING ASLEEP: MORE THAN HALF THE DAYS
7. TROUBLE CONCENTRATING ON THINGS, SUCH AS READING THE NEWSPAPER OR WATCHING TELEVISION: SEVERAL DAYS
2. FEELING DOWN, DEPRESSED OR HOPELESS: NEARLY EVERY DAY

## 2023-11-20 NOTE — FLOWSHEET NOTE
11/20/23 1215   Over the past 2 weeks, how often have you been bothered by any of the following problems? Little interest or pleasure in doing things Almost all   Feeling down, depressed, or hopeless Almost all   Patient Health Questionnaire-2 Score 6   Over the past 2 weeks, how often have you been bothered by any of the following problems? Trouble falling or staying asleep, or sleeping too much Over half   Feeling tired or having little energy Over half   Poor appetite or overeating Not at all   Feeling bad about yourself - or that you are a failure or have let yourself or your family down Over half   Trouble concentrating on things, such as reading the newspaper or watching television Several days   Moving or speaking so slowly that other people could have noticed? Or the opposite - being so fidgety or restless that you have been moving around a lot more than usual. Not at all   Thoughts that you would be better off dead or hurting yourself in some way Not at all   Patient Health Questionnaire-9 Score 13   If you checked off any problems on this questionnaire so far,   How difficult have these problems made it for you to do your work, take care of things at home, or get along with other people? Not difficult at all     CHF    Faxed to Dr. Buck Roach office notified via telephone.

## 2023-11-20 NOTE — PLAN OF CARE
Problem: Chronic Conditions and Co-morbidities  Goal: Patient's chronic conditions and co-morbidity symptoms are monitored and maintained or improved  Flowsheets (Taken 11/20/2023 2203)  Care Plan - Patient's Chronic Conditions and Co-Morbidity Symptoms are Monitored and Maintained or Improved: Monitor and assess patient's chronic conditions and comorbid symptoms for stability, deterioration, or improvement

## 2023-11-20 NOTE — PROGRESS NOTES
Congestive Heart Failure [address]       Referring Provider: -  Primary Care Physician: Dat Cavanaugh MD   Cardiologist: Dr. Tyler Robles  Nephrologist: Dr. Carlie Harris:     Lisa Hester is a 76 y.o. (1955) female with a history of HFpEF, most recent EF:  Lab Results   Component Value Date    LVEF 86 06/28/2023         She presents to the CHF clinic for ongoing evaluation and monitoring of heart failure. In the CHF clinic today she denies any adverse symptoms except:  [x] Dizziness or lightheadedness   [x] Syncope or near syncope  [] Recent Fall  [] Shortness of breath at rest   [] Dyspnea with exertion  [] Decline in functional capacity (unable to perform activities they had previously been able to do)  [] Fatigue   [] Orthopnea  [] PND  [] Nocturnal cough  [] Hemoptysis  [] Chest pain, pressure, or discomfort  [] Palpitations  [] Abdominal distention  [] Abdominal bloating  [] Early satiety  [] Blood in stool   [] Diarrhea  [] Constipation  [] Nausea/Vomiting  [] Decreased urinary response to oral diuretic   [] Scrotal swelling   [] Lower extremity edema  [] Used PRN doses of oral diuretic   [] Weight gain              Wt Readings from Last 3 Encounters:   11/20/23 66.3 kg (146 lb 1.6 oz)   10/22/23 66.2 kg (146 lb)   09/19/23 67.1 kg (148 lb)           SOCIAL HISTORY:  [x] Denies tobacco, alcohol or illicit drug abuse  [] Tobacco use:  [] ETOH use:  [] Illicit drug use:        MEDICATIONS:    Allergies   Allergen Reactions    Synvisc [Hylan G-F 20]      Local swelling       Current Outpatient Medications:     triamcinolone (KENALOG) 0.1 % cream, Apply topically 2 times daily. , Disp: 30 g, Rfl: 0    insulin detemir (LEVEMIR FLEXTOUCH) 100 UNIT/ML injection pen, Inject 5 Units into the skin 2 times daily, Disp: 9 mL, Rfl: 3    Continuous Blood Gluc Sensor (FREESTYLE QUOC 3 SENSOR) MISC, Apply every 14 days, Disp: 2 each, Rfl: 3

## 2023-11-29 ENCOUNTER — HOSPITAL ENCOUNTER (OUTPATIENT)
Dept: CARDIOLOGY | Age: 68
Discharge: HOME | End: 2023-12-01
Payer: MEDICARE

## 2023-11-29 ENCOUNTER — OFFICE VISIT (OUTPATIENT)
Dept: VASCULAR SURGERY | Age: 68
End: 2023-11-29
Payer: MEDICARE

## 2023-11-29 DIAGNOSIS — I65.23 BILATERAL CAROTID ARTERY STENOSIS: ICD-10-CM

## 2023-11-29 DIAGNOSIS — I65.23 BILATERAL CAROTID ARTERY STENOSIS: Primary | ICD-10-CM

## 2023-11-29 DIAGNOSIS — R09.89 BRUIT OF LEFT CAROTID ARTERY: ICD-10-CM

## 2023-11-29 LAB
VAS LEFT CCA DIST EDV: 17 CM/S
VAS LEFT CCA DIST PSV: 83.2 CM/S
VAS LEFT CCA PROX EDV: 16.2 CM/S
VAS LEFT CCA PROX PSV: 107.5 CM/S
VAS LEFT ECA EDV: 16.2 CM/S
VAS LEFT ECA PSV: 143.5 CM/S
VAS LEFT ICA DIST EDV: 16.2 CM/S
VAS LEFT ICA DIST PSV: 105.7 CM/S
VAS LEFT ICA MID EDV: 28.1 CM/S
VAS LEFT ICA MID PSV: 201.5 CM/S
VAS LEFT ICA PROX EDV: 46.8 CM/S
VAS LEFT ICA PROX PSV: 200.8 CM/S
VAS LEFT ICA/CCA PSV: 1.9 NO UNITS
VAS LEFT VERTEBRAL EDV: 10.8 CM/S
VAS LEFT VERTEBRAL PSV: 54.5 CM/S
VAS RIGHT CCA DIST EDV: 18 CM/S
VAS RIGHT CCA DIST PSV: 86.7 CM/S
VAS RIGHT CCA PROX EDV: 14.1 CM/S
VAS RIGHT CCA PROX PSV: 91.9 CM/S
VAS RIGHT ECA EDV: 0 CM/S
VAS RIGHT ECA PSV: 82 CM/S
VAS RIGHT ICA DIST EDV: 19.9 CM/S
VAS RIGHT ICA DIST PSV: 107.5 CM/S
VAS RIGHT ICA MID EDV: 23.6 CM/S
VAS RIGHT ICA MID PSV: 151.5 CM/S
VAS RIGHT ICA PROX EDV: 38.9 CM/S
VAS RIGHT ICA PROX PSV: 162.7 CM/S
VAS RIGHT ICA/CCA PSV: 1.8 NO UNITS
VAS RIGHT VERTEBRAL EDV: 10.8 CM/S
VAS RIGHT VERTEBRAL PSV: 55.6 CM/S

## 2023-11-29 PROCEDURE — 1123F ACP DISCUSS/DSCN MKR DOCD: CPT | Performed by: SURGERY

## 2023-11-29 PROCEDURE — 99213 OFFICE O/P EST LOW 20 MIN: CPT | Performed by: SURGERY

## 2023-11-29 PROCEDURE — 93880 EXTRACRANIAL BILAT STUDY: CPT

## 2023-11-29 PROCEDURE — 93880 EXTRACRANIAL BILAT STUDY: CPT | Performed by: SURGERY

## 2023-11-29 NOTE — PROGRESS NOTES
Chief Complaint:   Chief Complaint   Patient presents with    Circulatory Problem     Follow up carotid stenosis. HPI: Patient came to the office for follow-up evaluation of her carotid artery disease, overall remaining stable, does have underlying cardiac issues, does follow-up with her cardiologist      Patient denies any focal lateralizing neurological symptoms like loss of speech, vision or loss of function of extremity    Patient can walk a few blocks lowly, and denies any symptoms of rest pain    Allergies   Allergen Reactions    Synvisc [Hylan G-F 20]      Local swelling       Current Outpatient Medications   Medication Sig Dispense Refill    insulin detemir (LEVEMIR FLEXTOUCH) 100 UNIT/ML injection pen Inject 5 Units into the skin 2 times daily (Patient taking differently: Inject 7 Units into the skin 2 times daily) 9 mL 3    Continuous Blood Gluc Sensor (FREESTYLE QUOC 3 SENSOR) MISC Apply every 14 days 2 each 3    Continuous Blood Gluc Sensor (FREESTYLE QUOC 2 SENSOR) MISC Change sensor every 14 days 6 each 4    atorvastatin (LIPITOR) 20 MG tablet TAKE ONE TABLET BY MOUTH DAILY 30 tablet 11    insulin lispro, 1 Unit Dial, (HUMALOG KWIKPEN) 100 UNIT/ML SOPN Inject 4 units plus low SS before meals BS  150-200 = 1 units, 201-250 = 2 units, 251-300 = 3 units, 301-350 = 4 units, 351-400 = 5 units, 401 = 6 units 4 Adjustable Dose Pre-filled Pen Syringe 5    dapagliflozin (FARXIGA) 10 MG tablet Take 1 tablet by mouth every morning      nitroGLYCERIN (NITROSTAT) 0.4 MG SL tablet Place 1 tablet under the tongue every 5 minutes as needed for Chest pain up to max of 3 total doses. If no relief after 1 dose, call 911. 2 tablet 0    hydrALAZINE (APRESOLINE) 25 MG tablet Take 1 tablet by mouth every 8 hours Usually taken 2 x day (Patient taking differently: Take 1 tablet by mouth in the morning and 1 tablet in the evening.  Usually taken 2 x day.) 90 tablet 1    verapamil (CALAN SR) 180 MG extended release

## 2023-12-13 ENCOUNTER — HOSPITAL ENCOUNTER (EMERGENCY)
Age: 68
Discharge: HOME | End: 2023-12-13
Attending: EMERGENCY MEDICINE
Payer: MEDICARE

## 2023-12-13 ENCOUNTER — APPOINTMENT (OUTPATIENT)
Dept: CT IMAGING | Age: 68
End: 2023-12-13
Payer: MEDICARE

## 2023-12-13 VITALS
WEIGHT: 145 LBS | HEART RATE: 67 BPM | TEMPERATURE: 98.5 F | RESPIRATION RATE: 16 BRPM | HEIGHT: 65 IN | OXYGEN SATURATION: 99 % | SYSTOLIC BLOOD PRESSURE: 148 MMHG | BODY MASS INDEX: 24.16 KG/M2 | DIASTOLIC BLOOD PRESSURE: 71 MMHG

## 2023-12-13 DIAGNOSIS — E16.2 HYPOGLYCEMIA: ICD-10-CM

## 2023-12-13 DIAGNOSIS — R10.84 GENERALIZED ABDOMINAL PAIN: Primary | ICD-10-CM

## 2023-12-13 DIAGNOSIS — M47.816 OSTEOARTHRITIS OF LUMBAR SPINE, UNSPECIFIED SPINAL OSTEOARTHRITIS COMPLICATION STATUS: ICD-10-CM

## 2023-12-13 LAB
ALBUMIN SERPL-MCNC: 4.1 G/DL (ref 3.5–5.2)
ALP SERPL-CCNC: 93 U/L (ref 35–104)
ALT SERPL-CCNC: 14 U/L (ref 0–32)
ANION GAP SERPL CALCULATED.3IONS-SCNC: 12 MMOL/L (ref 7–16)
AST SERPL-CCNC: 20 U/L (ref 0–31)
BASOPHILS # BLD: 0.06 K/UL (ref 0–0.2)
BASOPHILS NFR BLD: 1 % (ref 0–2)
BILIRUB SERPL-MCNC: 0.4 MG/DL (ref 0–1.2)
BILIRUB UR QL STRIP: NEGATIVE
BUN SERPL-MCNC: 38 MG/DL (ref 6–23)
CALCIUM SERPL-MCNC: 9.5 MG/DL (ref 8.6–10.2)
CHLORIDE SERPL-SCNC: 107 MMOL/L (ref 98–107)
CHP ED QC CHECK: YES
CLARITY UR: CLEAR
CO2 SERPL-SCNC: 25 MMOL/L (ref 22–29)
COLOR UR: YELLOW
COMMENT: ABNORMAL
CREAT SERPL-MCNC: 1.4 MG/DL (ref 0.5–1)
EOSINOPHIL # BLD: 0.36 K/UL (ref 0.05–0.5)
EOSINOPHILS RELATIVE PERCENT: 7 % (ref 0–6)
ERYTHROCYTE [DISTWIDTH] IN BLOOD BY AUTOMATED COUNT: 13.7 % (ref 11.5–15)
GFR SERPL CREATININE-BSD FRML MDRD: 40 ML/MIN/1.73M2
GLUCOSE BLD-MCNC: 204 MG/DL (ref 74–99)
GLUCOSE BLD-MCNC: 70 MG/DL (ref 74–99)
GLUCOSE BLD-MCNC: NORMAL MG/DL
GLUCOSE SERPL-MCNC: 70 MG/DL (ref 74–99)
GLUCOSE UR STRIP-MCNC: >=1000 MG/DL
HCT VFR BLD AUTO: 35.9 % (ref 34–48)
HGB BLD-MCNC: 11.3 G/DL (ref 11.5–15.5)
HGB UR QL STRIP.AUTO: NEGATIVE
IMM GRANULOCYTES # BLD AUTO: <0.03 K/UL (ref 0–0.58)
IMM GRANULOCYTES NFR BLD: 0 % (ref 0–5)
KETONES UR STRIP-MCNC: NEGATIVE MG/DL
LACTATE BLDV-SCNC: 0.8 MMOL/L (ref 0.5–2.2)
LEUKOCYTE ESTERASE UR QL STRIP: NEGATIVE
LIPASE SERPL-CCNC: 26 U/L (ref 13–60)
LYMPHOCYTES NFR BLD: 1.44 K/UL (ref 1.5–4)
LYMPHOCYTES RELATIVE PERCENT: 27 % (ref 20–42)
MCH RBC QN AUTO: 28.6 PG (ref 26–35)
MCHC RBC AUTO-ENTMCNC: 31.5 G/DL (ref 32–34.5)
MCV RBC AUTO: 90.9 FL (ref 80–99.9)
MONOCYTES NFR BLD: 0.6 K/UL (ref 0.1–0.95)
MONOCYTES NFR BLD: 11 % (ref 2–12)
NEUTROPHILS NFR BLD: 54 % (ref 43–80)
NEUTS SEG NFR BLD: 2.91 K/UL (ref 1.8–7.3)
NITRITE UR QL STRIP: NEGATIVE
PH UR STRIP: 6 [PH] (ref 5–9)
PLATELET # BLD AUTO: 296 K/UL (ref 130–450)
PMV BLD AUTO: 10.1 FL (ref 7–12)
POTASSIUM SERPL-SCNC: 4.4 MMOL/L (ref 3.5–5)
PROT SERPL-MCNC: 6.7 G/DL (ref 6.4–8.3)
PROT UR STRIP-MCNC: NEGATIVE MG/DL
RBC # BLD AUTO: 3.95 M/UL (ref 3.5–5.5)
SODIUM SERPL-SCNC: 144 MMOL/L (ref 132–146)
SP GR UR STRIP: 1.01 (ref 1–1.03)
UROBILINOGEN UR STRIP-ACNC: 0.2 EU/DL (ref 0–1)
WBC OTHER # BLD: 5.4 K/UL (ref 4.5–11.5)

## 2023-12-13 PROCEDURE — C9113 INJ PANTOPRAZOLE SODIUM, VIA: HCPCS | Performed by: EMERGENCY MEDICINE

## 2023-12-13 PROCEDURE — 74177 CT ABD & PELVIS W/CONTRAST: CPT

## 2023-12-13 PROCEDURE — 83690 ASSAY OF LIPASE: CPT

## 2023-12-13 PROCEDURE — 82962 GLUCOSE BLOOD TEST: CPT

## 2023-12-13 PROCEDURE — 96374 THER/PROPH/DIAG INJ IV PUSH: CPT

## 2023-12-13 PROCEDURE — 6360000004 HC RX CONTRAST MEDICATION: Performed by: RADIOLOGY

## 2023-12-13 PROCEDURE — 80053 COMPREHEN METABOLIC PANEL: CPT

## 2023-12-13 PROCEDURE — 83605 ASSAY OF LACTIC ACID: CPT

## 2023-12-13 PROCEDURE — 85025 COMPLETE CBC W/AUTO DIFF WBC: CPT

## 2023-12-13 PROCEDURE — 2580000003 HC RX 258: Performed by: EMERGENCY MEDICINE

## 2023-12-13 PROCEDURE — 6360000002 HC RX W HCPCS: Performed by: EMERGENCY MEDICINE

## 2023-12-13 PROCEDURE — 6370000000 HC RX 637 (ALT 250 FOR IP): Performed by: EMERGENCY MEDICINE

## 2023-12-13 PROCEDURE — 81003 URINALYSIS AUTO W/O SCOPE: CPT

## 2023-12-13 PROCEDURE — 99285 EMERGENCY DEPT VISIT HI MDM: CPT

## 2023-12-13 RX ORDER — GLUCAGON HYDROCHLORIDE 1 MG
1 KIT INJECTION
Qty: 1 EACH | Refills: 0 | Status: SHIPPED | OUTPATIENT
Start: 2023-12-13 | End: 2023-12-13

## 2023-12-13 RX ORDER — ONDANSETRON 4 MG/1
4 TABLET, FILM COATED ORAL EVERY 8 HOURS PRN
Qty: 20 TABLET | Refills: 0 | Status: SHIPPED | OUTPATIENT
Start: 2023-12-13

## 2023-12-13 RX ORDER — PANTOPRAZOLE SODIUM 40 MG/1
40 TABLET, DELAYED RELEASE ORAL DAILY
Qty: 30 TABLET | Refills: 0 | Status: SHIPPED | OUTPATIENT
Start: 2023-12-13 | End: 2024-01-12

## 2023-12-13 RX ORDER — 0.9 % SODIUM CHLORIDE 0.9 %
500 INTRAVENOUS SOLUTION INTRAVENOUS ONCE
Status: COMPLETED | OUTPATIENT
Start: 2023-12-13 | End: 2023-12-13

## 2023-12-13 RX ORDER — SUCRALFATE 1 G/1
1 TABLET ORAL 4 TIMES DAILY
Qty: 120 TABLET | Refills: 3 | Status: SHIPPED | OUTPATIENT
Start: 2023-12-13

## 2023-12-13 RX ORDER — PANTOPRAZOLE SODIUM 40 MG/10ML
40 INJECTION, POWDER, LYOPHILIZED, FOR SOLUTION INTRAVENOUS ONCE
Status: COMPLETED | OUTPATIENT
Start: 2023-12-13 | End: 2023-12-13

## 2023-12-13 RX ORDER — DEXTROSE MONOHYDRATE 100 MG/ML
INJECTION, SOLUTION INTRAVENOUS CONTINUOUS PRN
Status: DISCONTINUED | OUTPATIENT
Start: 2023-12-13 | End: 2023-12-13 | Stop reason: HOSPADM

## 2023-12-13 RX ADMIN — DEXTROSE MONOHYDRATE 250 ML: 10 INJECTION, SOLUTION INTRAVENOUS at 10:45

## 2023-12-13 RX ADMIN — DEXTROSE MONOHYDRATE 125 ML: 10 INJECTION, SOLUTION INTRAVENOUS at 08:43

## 2023-12-13 RX ADMIN — SODIUM CHLORIDE 500 ML: 9 INJECTION, SOLUTION INTRAVENOUS at 08:33

## 2023-12-13 RX ADMIN — IOPAMIDOL 75 ML: 755 INJECTION, SOLUTION INTRAVENOUS at 10:09

## 2023-12-13 RX ADMIN — PANTOPRAZOLE SODIUM 40 MG: 40 INJECTION, POWDER, FOR SOLUTION INTRAVENOUS at 11:45

## 2023-12-13 RX ADMIN — ALUMINUM HYDROXIDE, MAGNESIUM HYDROXIDE, AND SIMETHICONE: 200; 200; 20 SUSPENSION ORAL at 11:44

## 2023-12-13 ASSESSMENT — PAIN - FUNCTIONAL ASSESSMENT
PAIN_FUNCTIONAL_ASSESSMENT: 0-10
PAIN_FUNCTIONAL_ASSESSMENT: NONE - DENIES PAIN

## 2023-12-13 ASSESSMENT — LIFESTYLE VARIABLES
HOW MANY STANDARD DRINKS CONTAINING ALCOHOL DO YOU HAVE ON A TYPICAL DAY: PATIENT DOES NOT DRINK
HOW OFTEN DO YOU HAVE A DRINK CONTAINING ALCOHOL: NEVER

## 2023-12-13 ASSESSMENT — PAIN SCALES - GENERAL
PAINLEVEL_OUTOF10: 4
PAINLEVEL_OUTOF10: 10

## 2023-12-13 ASSESSMENT — PAIN DESCRIPTION - ORIENTATION: ORIENTATION: RIGHT

## 2023-12-13 ASSESSMENT — PAIN DESCRIPTION - LOCATION: LOCATION: ABDOMEN

## 2023-12-13 ASSESSMENT — PAIN DESCRIPTION - ONSET: ONSET: ON-GOING

## 2023-12-13 ASSESSMENT — PAIN DESCRIPTION - FREQUENCY: FREQUENCY: CONTINUOUS

## 2023-12-13 ASSESSMENT — PAIN DESCRIPTION - PAIN TYPE: TYPE: ACUTE PAIN

## 2023-12-13 NOTE — DISCHARGE INSTRUCTIONS
Call family doctor and general surgery and schedule a followup appointment to be seen in 2 days    Have your doctor obtain  finalized report of CT scan today

## 2023-12-13 NOTE — ED PROVIDER NOTES
[address] ENCOUNTER        Pt Name: Jeannette Kuo  MRN: [id number]  [facility] 1955  Date of evaluation: 12/13/2023  Provider: Jaime Santana DO  PCP: Cass Orr MD  Note Started: 8:30 AM EST 12/13/23    CHIEF COMPLAINT       Chief Complaint   Patient presents with    Abdominal Pain     Right side abd pains, gets worse throughout the day, ongoing issue       HISTORY OF PRESENT ILLNESS: 1 or more Elements     History from : Patient    Limitations to history : None    Jeannette Kuo is a 76 y.o. female who has history of appendectomy and cholecystectomy w dr Migdalia Ignacio presents for right sided abdominal pain for past 2-3 days. Pt notes pain is sharp and acheing she notes pain is periumbilcal and radiates to right side. Pt denies any uti sx, last bm Monday denies any constipation diarrhea melena or hematochezia. Pt denies any chest pain epigastric pain or sob. Pt denies nausea. Pt wants to hold off on pain medications at this time for she is driving. Nursing Notes were all reviewed and agreed with or any disagreements were addressed in the HPI. REVIEW OF SYSTEMS :      Review of Systems   Respiratory:  Negative for cough and shortness of breath. Cardiovascular:  Negative for chest pain and palpitations. Gastrointestinal:  Positive for abdominal pain. Genitourinary:  Negative for flank pain. Positives and Pertinent negatives as per HPI. SURGICAL HISTORY     Past Surgical History:   Procedure Laterality Date    APPENDECTOMY      CHOLECYSTECTOMY      FRACTURE SURGERY Right     arm    KNEE ARTHROPLASTY Right 2011    Right TKA. Conchis Yoder MD        [facility]       Discharge Medication List as of 12/13/2023 12:57 PM        CONTINUE these medications which have NOT CHANGED    Details   triamcinolone (KENALOG) 0.1 % cream Apply topically 2 times daily. , Disp-30 g, R-0, Normal      insulin detemir

## 2023-12-28 ENCOUNTER — HOSPITAL ENCOUNTER (OUTPATIENT)
Dept: OTHER | Age: 68
Setting detail: THERAPIES SERIES
Discharge: HOME | End: 2023-12-28
Payer: MEDICARE

## 2023-12-28 VITALS
OXYGEN SATURATION: 99 % | WEIGHT: 145 LBS | HEART RATE: 69 BPM | RESPIRATION RATE: 16 BRPM | DIASTOLIC BLOOD PRESSURE: 60 MMHG | SYSTOLIC BLOOD PRESSURE: 132 MMHG | BODY MASS INDEX: 24.13 KG/M2

## 2023-12-28 LAB
ANION GAP SERPL CALCULATED.3IONS-SCNC: 12 MMOL/L (ref 7–16)
BNP SERPL-MCNC: 358 PG/ML (ref 0–125)
BUN SERPL-MCNC: 27 MG/DL (ref 6–23)
CALCIUM SERPL-MCNC: 9 MG/DL (ref 8.6–10.2)
CHLORIDE SERPL-SCNC: 103 MMOL/L (ref 98–107)
CO2 SERPL-SCNC: 26 MMOL/L (ref 22–29)
CREAT SERPL-MCNC: 1.4 MG/DL (ref 0.5–1)
GFR SERPL CREATININE-BSD FRML MDRD: 42 ML/MIN/1.73M2
GLUCOSE SERPL-MCNC: 70 MG/DL (ref 74–99)
POTASSIUM SERPL-SCNC: 4.1 MMOL/L (ref 3.5–5)
SODIUM SERPL-SCNC: 141 MMOL/L (ref 132–146)

## 2023-12-28 PROCEDURE — 80048 BASIC METABOLIC PNL TOTAL CA: CPT

## 2023-12-28 PROCEDURE — 83880 ASSAY OF NATRIURETIC PEPTIDE: CPT

## 2023-12-28 PROCEDURE — 99214 OFFICE O/P EST MOD 30 MIN: CPT

## 2023-12-28 NOTE — PLAN OF CARE
Problem: Chronic Conditions and Co-morbidities  Goal: Patient's chronic conditions and co-morbidity symptoms are monitored and maintained or improved  Flowsheets (Taken 12/28/2023 0072)  Care Plan - Patient's Chronic Conditions and Co-Morbidity Symptoms are Monitored and Maintained or Improved: Monitor and assess patient's chronic conditions and comorbid symptoms for stability, deterioration, or improvement

## 2023-12-28 NOTE — PROGRESS NOTES
Congestive Heart Failure [address]       Referring Provider: -  Primary Care Physician: Kaleb Robb MD   Cardiologist: Dr. Milagro De Anda  Nephrologist: Dr. Pickering Row:     Aaron Cook is a 76 y.o. (1955) female with a history of HFpEF, most recent EF:  Lab Results   Component Value Date    LVEF 86 06/28/2023         She presents to the CHF clinic for ongoing evaluation and monitoring of heart failure.     In the CHF clinic today she denies any adverse symptoms except:  [x] Dizziness or lightheadedness   [x] Syncope or near syncope  [] Recent Fall  [] Shortness of breath at rest   [] Dyspnea with exertion  [] Decline in functional capacity (unable to perform activities they had previously been able to do)  [] Fatigue   [] Orthopnea  [] PND  [] Nocturnal cough  [] Hemoptysis  [] Chest pain, pressure, or discomfort  [] Palpitations  [] Abdominal distention  [] Abdominal bloating  [] Early satiety  [] Blood in stool   [] Diarrhea  [] Constipation  [] Nausea/Vomiting  [] Decreased urinary response to oral diuretic   [] Scrotal swelling   [] Lower extremity edema  [] Used PRN doses of oral diuretic   [] Weight gain              Wt Readings from Last 3 Encounters:   12/28/23 65.8 kg (145 lb)   12/13/23 65.8 kg (145 lb)   11/20/23 66.3 kg (146 lb 1.6 oz)           SOCIAL HISTORY:  [x] Denies tobacco, alcohol or illicit drug abuse  [] Tobacco use:  [] ETOH use:  [] Illicit drug use:        MEDICATIONS:    Allergies   Allergen Reactions    Synvisc [Hylan G-F 20]      Local swelling       Current Outpatient Medications:     sucralfate (CARAFATE) 1 GM tablet, Take 1 tablet by mouth 4 times daily, Disp: 120 tablet, Rfl: 3    pantoprazole (PROTONIX) 40 MG tablet, Take 1 tablet by mouth daily, Disp: 30 tablet, Rfl: 0    ondansetron (ZOFRAN) 4 MG tablet, Take 1 tablet by mouth every 8 hours as needed for Nausea or Vomiting, Disp: 20 tablet, Rfl: 0

## 2024-01-02 DIAGNOSIS — E11.65 TYPE 2 DIABETES MELLITUS WITH HYPERGLYCEMIA, WITH LONG-TERM CURRENT USE OF INSULIN (HCC): ICD-10-CM

## 2024-01-02 DIAGNOSIS — Z79.4 TYPE 2 DIABETES MELLITUS WITH HYPERGLYCEMIA, WITH LONG-TERM CURRENT USE OF INSULIN (HCC): ICD-10-CM

## 2024-01-03 DIAGNOSIS — E11.65 TYPE 2 DIABETES MELLITUS WITH HYPERGLYCEMIA, WITH LONG-TERM CURRENT USE OF INSULIN (HCC): ICD-10-CM

## 2024-01-03 DIAGNOSIS — Z79.4 TYPE 2 DIABETES MELLITUS WITH HYPERGLYCEMIA, WITH LONG-TERM CURRENT USE OF INSULIN (HCC): ICD-10-CM

## 2024-01-03 RX ORDER — BLOOD-GLUCOSE SENSOR
EACH MISCELLANEOUS
Refills: 0 | OUTPATIENT
Start: 2024-01-03

## 2024-01-03 RX ORDER — BLOOD-GLUCOSE SENSOR
EACH MISCELLANEOUS
Qty: 2 EACH | Refills: 3 | Status: SHIPPED | OUTPATIENT
Start: 2024-01-03

## 2024-01-11 ENCOUNTER — OFFICE VISIT (OUTPATIENT)
Dept: ENDOCRINOLOGY | Age: 69
End: 2024-01-11

## 2024-01-11 VITALS
BODY MASS INDEX: 24.29 KG/M2 | SYSTOLIC BLOOD PRESSURE: 147 MMHG | HEART RATE: 76 BPM | WEIGHT: 145.8 LBS | DIASTOLIC BLOOD PRESSURE: 57 MMHG | HEIGHT: 65 IN | RESPIRATION RATE: 18 BRPM | OXYGEN SATURATION: 99 %

## 2024-01-11 DIAGNOSIS — E11.65 TYPE 2 DIABETES MELLITUS WITH HYPERGLYCEMIA, WITH LONG-TERM CURRENT USE OF INSULIN (HCC): Primary | ICD-10-CM

## 2024-01-11 DIAGNOSIS — Z79.4 TYPE 2 DIABETES MELLITUS WITH HYPERGLYCEMIA, WITH LONG-TERM CURRENT USE OF INSULIN (HCC): Primary | ICD-10-CM

## 2024-01-11 DIAGNOSIS — N18.32 STAGE 3B CHRONIC KIDNEY DISEASE (HCC): ICD-10-CM

## 2024-01-11 DIAGNOSIS — E11.42 TYPE 2 DIABETES MELLITUS WITH POLYNEUROPATHY (HCC): ICD-10-CM

## 2024-01-11 LAB — HBA1C MFR BLD: 7.4 %

## 2024-01-11 RX ORDER — INSULIN PMP CART,AUT,G6/7,CNTR
EACH SUBCUTANEOUS
Qty: 30 EACH | Refills: 4 | Status: SHIPPED | OUTPATIENT
Start: 2024-01-11

## 2024-01-11 RX ORDER — INSULIN PMP CART,AUT,G6/7,CNTR
EACH SUBCUTANEOUS
Qty: 1 KIT | Refills: 0 | Status: SHIPPED | OUTPATIENT
Start: 2024-01-11

## 2024-01-11 NOTE — PROGRESS NOTES
CONTINUOUS GLUCOSE MONITORING ANALYSIS I&R      2. Type 2 diabetes mellitus with polyneuropathy (HCC)        3. Stage 3b chronic kidney disease (HCC)              Plan:     1. Type 2 diabetes mellitus with hyperglycemia, with long-term current use of insulin (HCC)    2. Type 2 diabetes mellitus with polyneuropathy (HCC)    3. Stage 3b chronic kidney disease (HCC)          Diabetes Mellitus Type 2   Patient's diabetes is uncontrolled  8.2%->7.3%->7.4%  Highs in Am likely  due to eulogio phenomenon  Will change DM regimen to:  Levemir to 8 units in AM and in PM   Change Humalog 6/4/4  units Plus low SS with meals  Uses scale to correct  Take additional unit for coffee consumption  The patient was advised to check blood sugars 4 times a day before meals and at bedtime and send BS readings to our office in a week.  Discussed with patient A1c and blood sugar goals   Optimal blood sugars: 100-140 pre-prandial, < 180 peak post-prandial  The patient counseled about the complications of uncontrolled diabetes   Patient was counselled about the importance of self-blood glucose monitoring and eating consistent carb diet to avoid blood sugar fluctuations   Interested in OmniPOd - will order  Patient will need routine diabetes maintenance and prevention  Discussed lifestyle changes including diet and exercise with patient  Pt will call if BS  <70    Polyneuropathy  Was on gabapentin  Now off gabapentin due to CHF dx    CKD Stage 3B  Will monitor  Labs were taken while being hospitalized       I personally spent > 30 minutes  reviewing  external notes from PCP and other patient's care team providers, and personally interpreted labs associated with the above diagnosis. I also ordered labs to further assess and manage the above addressed medical conditions.     Return in about 4 months (around 5/11/2024) for Type 2 DM.    The above issues were reviewed with the patient who understood and agreed with the plan.    Thank you for allowing

## 2024-01-25 ENCOUNTER — APPOINTMENT (OUTPATIENT)
Dept: CT IMAGING | Age: 69
End: 2024-01-25
Payer: MEDICARE

## 2024-01-25 ENCOUNTER — HOSPITAL ENCOUNTER (EMERGENCY)
Age: 69
Discharge: HOME | End: 2024-01-25
Attending: STUDENT IN AN ORGANIZED HEALTH CARE EDUCATION/TRAINING PROGRAM
Payer: MEDICARE

## 2024-01-25 VITALS
HEART RATE: 68 BPM | WEIGHT: 146 LBS | TEMPERATURE: 97.1 F | BODY MASS INDEX: 24.3 KG/M2 | DIASTOLIC BLOOD PRESSURE: 56 MMHG | RESPIRATION RATE: 16 BRPM | SYSTOLIC BLOOD PRESSURE: 167 MMHG | OXYGEN SATURATION: 100 %

## 2024-01-25 DIAGNOSIS — R10.10 PAIN OF UPPER ABDOMEN: Primary | ICD-10-CM

## 2024-01-25 DIAGNOSIS — R20.2 PARESTHESIA: ICD-10-CM

## 2024-01-25 LAB
ALBUMIN SERPL-MCNC: 4.6 G/DL (ref 3.5–5.2)
ALP SERPL-CCNC: 85 U/L (ref 35–104)
ALT SERPL-CCNC: 14 U/L (ref 0–32)
ANION GAP SERPL CALCULATED.3IONS-SCNC: 9 MMOL/L (ref 7–16)
AST SERPL-CCNC: 18 U/L (ref 0–31)
BASOPHILS # BLD: 0.07 K/UL (ref 0–0.2)
BASOPHILS NFR BLD: 1 % (ref 0–2)
BILIRUB SERPL-MCNC: 0.5 MG/DL (ref 0–1.2)
BILIRUB UR QL STRIP: NEGATIVE
BUN SERPL-MCNC: 30 MG/DL (ref 6–23)
CALCIUM SERPL-MCNC: 9.8 MG/DL (ref 8.6–10.2)
CHLORIDE SERPL-SCNC: 104 MMOL/L (ref 98–107)
CLARITY UR: CLEAR
CO2 SERPL-SCNC: 29 MMOL/L (ref 22–29)
COLOR UR: YELLOW
CREAT SERPL-MCNC: 1.3 MG/DL (ref 0.5–1)
EOSINOPHIL # BLD: 0.21 K/UL (ref 0.05–0.5)
EOSINOPHILS RELATIVE PERCENT: 3 % (ref 0–6)
ERYTHROCYTE [DISTWIDTH] IN BLOOD BY AUTOMATED COUNT: 13.3 % (ref 11.5–15)
GFR SERPL CREATININE-BSD FRML MDRD: 44 ML/MIN/1.73M2
GLUCOSE SERPL-MCNC: 200 MG/DL (ref 74–99)
GLUCOSE UR STRIP-MCNC: >=1000 MG/DL
HCT VFR BLD AUTO: 35.6 % (ref 34–48)
HGB BLD-MCNC: 11.3 G/DL (ref 11.5–15.5)
HGB UR QL STRIP.AUTO: NEGATIVE
IMM GRANULOCYTES # BLD AUTO: <0.03 K/UL (ref 0–0.58)
IMM GRANULOCYTES NFR BLD: 0 % (ref 0–5)
KETONES UR STRIP-MCNC: NEGATIVE MG/DL
LACTATE BLDV-SCNC: 1.1 MMOL/L (ref 0.5–2.2)
LEUKOCYTE ESTERASE UR QL STRIP: ABNORMAL
LIPASE SERPL-CCNC: 36 U/L (ref 13–60)
LYMPHOCYTES NFR BLD: 1.48 K/UL (ref 1.5–4)
LYMPHOCYTES RELATIVE PERCENT: 24 % (ref 20–42)
MCH RBC QN AUTO: 29.4 PG (ref 26–35)
MCHC RBC AUTO-ENTMCNC: 31.7 G/DL (ref 32–34.5)
MCV RBC AUTO: 92.7 FL (ref 80–99.9)
MONOCYTES NFR BLD: 0.41 K/UL (ref 0.1–0.95)
MONOCYTES NFR BLD: 7 % (ref 2–12)
NEUTROPHILS NFR BLD: 64 % (ref 43–80)
NEUTS SEG NFR BLD: 3.93 K/UL (ref 1.8–7.3)
NITRITE UR QL STRIP: NEGATIVE
PH UR STRIP: 6 [PH] (ref 5–9)
PLATELET # BLD AUTO: 315 K/UL (ref 130–450)
PMV BLD AUTO: 10.1 FL (ref 7–12)
POTASSIUM SERPL-SCNC: 4.3 MMOL/L (ref 3.5–5)
PROT SERPL-MCNC: 7.5 G/DL (ref 6.4–8.3)
PROT UR STRIP-MCNC: NEGATIVE MG/DL
RBC # BLD AUTO: 3.84 M/UL (ref 3.5–5.5)
RBC #/AREA URNS HPF: ABNORMAL /HPF
RENAL EPITHELIAL, UA: PRESENT /HPF
SODIUM SERPL-SCNC: 142 MMOL/L (ref 132–146)
SP GR UR STRIP: 1.01 (ref 1–1.03)
UROBILINOGEN UR STRIP-ACNC: 0.2 EU/DL (ref 0–1)
WBC #/AREA URNS HPF: ABNORMAL /HPF
WBC OTHER # BLD: 6.1 K/UL (ref 4.5–11.5)

## 2024-01-25 PROCEDURE — 81001 URINALYSIS AUTO W/SCOPE: CPT

## 2024-01-25 PROCEDURE — 6370000000 HC RX 637 (ALT 250 FOR IP): Performed by: STUDENT IN AN ORGANIZED HEALTH CARE EDUCATION/TRAINING PROGRAM

## 2024-01-25 PROCEDURE — 83605 ASSAY OF LACTIC ACID: CPT

## 2024-01-25 PROCEDURE — 83690 ASSAY OF LIPASE: CPT

## 2024-01-25 PROCEDURE — 6360000004 HC RX CONTRAST MEDICATION: Performed by: RADIOLOGY

## 2024-01-25 PROCEDURE — 85025 COMPLETE CBC W/AUTO DIFF WBC: CPT

## 2024-01-25 PROCEDURE — 74177 CT ABD & PELVIS W/CONTRAST: CPT

## 2024-01-25 PROCEDURE — 99285 EMERGENCY DEPT VISIT HI MDM: CPT

## 2024-01-25 PROCEDURE — 80053 COMPREHEN METABOLIC PANEL: CPT

## 2024-01-25 RX ORDER — GABAPENTIN 100 MG/1
100 CAPSULE ORAL 2 TIMES DAILY
Qty: 10 CAPSULE | Refills: 0 | Status: SHIPPED | OUTPATIENT
Start: 2024-01-25 | End: 2024-01-30

## 2024-01-25 RX ORDER — GABAPENTIN 100 MG/1
100 CAPSULE ORAL ONCE
Status: COMPLETED | OUTPATIENT
Start: 2024-01-25 | End: 2024-01-25

## 2024-01-25 RX ORDER — LIDOCAINE 4 G/G
1 PATCH TOPICAL DAILY
Status: DISCONTINUED | OUTPATIENT
Start: 2024-01-25 | End: 2024-01-25 | Stop reason: HOSPADM

## 2024-01-25 RX ORDER — LIDOCAINE 50 MG/G
1 PATCH TOPICAL DAILY
Qty: 10 PATCH | Refills: 0 | Status: SHIPPED | OUTPATIENT
Start: 2024-01-25 | End: 2024-02-04

## 2024-01-25 RX ADMIN — IOPAMIDOL 75 ML: 755 INJECTION, SOLUTION INTRAVENOUS at 16:27

## 2024-01-25 RX ADMIN — GABAPENTIN 100 MG: 100 CAPSULE ORAL at 12:31

## 2024-01-25 ASSESSMENT — ENCOUNTER SYMPTOMS
ABDOMINAL PAIN: 1
NAUSEA: 0
ABDOMINAL DISTENTION: 0
COUGH: 0
PHOTOPHOBIA: 0
DIARRHEA: 0
CHEST TIGHTNESS: 0
SHORTNESS OF BREATH: 0
VOMITING: 0

## 2024-01-25 NOTE — ED NOTES
Department of Emergency Medicine  FIRST PROVIDER TRIAGE NOTE             Independent MLP           1/25/24  8:41 AM EST    Date of Encounter: 1/25/24   MRN: [id number]      HPI: Sandhya Adame is a 68 y.o. female who presents to the ED for No chief complaint on file.     ABDOMINAL PAIN SINCE DECEMBER.  CT WAS DONE OUTPATIENT AND TOLD TO CONTACT DR NICHOLAS.  GAVE HER 2 INJECTIONS TO \"NUMB THE AREA\".    SHE WAS EVALUATED BY HIM 1 WEEK AGO, HAD MORE \"SHOTS\", CALLED THE NEXT DAY, HE WANTED HER TO COME TO THE ER     ROS: Negative for fever or cough.    PE: Gen Appearance/Constitutional: alert  HEENT: NC/NT. PERRLA,  Airway patent.     Initial Plan of Care: All treatment areas with department are currently occupied. Plan to order/Initiate the following while awaiting opening in ED.  Initiate Treatment-Testing, Proceed toTreatment Area When Bed Available for ED Attending/MLP to Continue Care    Electronically signed by NEWTON Márquez CNP   DD: 1/25/24      Oleksandr Piña APRN - CNP  01/25/24 0843

## 2024-01-25 NOTE — ED NOTES
INITIAL CONTACT WITH. PT ALERT AND ORIENTED TIMES 4. SKIN WARM AND DRY.RESPIRATIONS EVEN AND UNLABORED. DISCHARGE INSTRUCTIONS GIVEN AND PT VERBALIZED UNDERSTANDING OF THE INFORMATION OF ALL PAGES OF THE AFTER VISIT SUMMARY HAS BEEN REVIEWED WITH PATIENT AND FAMILY. PT GIVEN OPPORTUNITY TO ASK QUESTIONS REGARDING THERE INFORMATION. PT VERBALIZED UNDERSTANDING THEY SHOULD DISPOSE OF THE ARMBAND SAFELY AT HOME TO PROTECT THERE HEALTH INFORMATION. THE COMPLETE COPY WAS PROVIDED TO PATIENT OR CAREGIVER. GAIT STEADY SETH AND NO DISTRESS NOTED.

## 2024-01-25 NOTE — ED PROVIDER NOTES
Sandhya Aadme is a 68-year-old female present emergency department concern for abdominal pain.  Patient states that she has been having right upper quadrant epigastric abdominal pain.  Pain is a burning pain.  Patient sometimes has pain over her skin when her close after scan.  Patient is not having nausea or vomiting.  Patient does follow with Dr Ambrocio for surgery.  Patient has a history of a cholecystectomy and remote history of a history of appendectomy.  Patient denies chest pain or shortness of breath.  Patient denies any history of a rash.  Patient states that she is having tingling to the area and has been present for about 5 to 6 weeks.  Patient states she did follow with her surgeon and was given injections of the abdomen with only mild improvement of symptoms.  Patient denies fever, chills, nausea, vomiting, dysuria, patient is not have a rash.    The history is provided by the patient and medical records.        Review of Systems   Constitutional:  Negative for chills, diaphoresis, fatigue and fever.   Eyes:  Negative for photophobia and visual disturbance.   Respiratory:  Negative for cough, chest tightness and shortness of breath.    Cardiovascular:  Negative for chest pain, palpitations and leg swelling.   Gastrointestinal:  Positive for abdominal pain. Negative for abdominal distention, diarrhea, nausea and vomiting.   Genitourinary:  Negative for dysuria.   Musculoskeletal:  Negative for neck pain and neck stiffness.   Skin:  Negative for pallor and rash.   Neurological:  Negative for headaches.   Psychiatric/Behavioral:  Negative for confusion.         Physical Exam  Vitals and nursing note reviewed.   Constitutional:       General: She is not in acute distress.     Appearance: Normal appearance. She is not ill-appearing.   HENT:      Head: Normocephalic and atraumatic.   Eyes:      General: No scleral icterus.     Conjunctiva/sclera: Conjunctivae normal.      Pupils: Pupils are equal, round, and

## 2024-02-02 ENCOUNTER — INITIAL CONSULT (OUTPATIENT)
Age: 69
End: 2024-02-02
Payer: MEDICARE

## 2024-02-02 VITALS
OXYGEN SATURATION: 98 % | HEART RATE: 68 BPM | RESPIRATION RATE: 18 BRPM | DIASTOLIC BLOOD PRESSURE: 60 MMHG | SYSTOLIC BLOOD PRESSURE: 108 MMHG | HEIGHT: 65 IN | TEMPERATURE: 96.8 F | WEIGHT: 143 LBS | BODY MASS INDEX: 23.82 KG/M2

## 2024-02-02 DIAGNOSIS — Z12.11 COLON CANCER SCREENING: ICD-10-CM

## 2024-02-02 DIAGNOSIS — R10.11 RIGHT UPPER QUADRANT ABDOMINAL PAIN: Primary | ICD-10-CM

## 2024-02-02 DIAGNOSIS — R10.13 EPIGASTRIC PAIN: ICD-10-CM

## 2024-02-02 PROCEDURE — 1036F TOBACCO NON-USER: CPT | Performed by: NURSE PRACTITIONER

## 2024-02-02 PROCEDURE — G8484 FLU IMMUNIZE NO ADMIN: HCPCS | Performed by: NURSE PRACTITIONER

## 2024-02-02 PROCEDURE — 99202 OFFICE O/P NEW SF 15 MIN: CPT | Performed by: NURSE PRACTITIONER

## 2024-02-02 PROCEDURE — 1123F ACP DISCUSS/DSCN MKR DOCD: CPT | Performed by: NURSE PRACTITIONER

## 2024-02-02 PROCEDURE — G8420 CALC BMI NORM PARAMETERS: HCPCS | Performed by: NURSE PRACTITIONER

## 2024-02-02 PROCEDURE — 1090F PRES/ABSN URINE INCON ASSESS: CPT | Performed by: NURSE PRACTITIONER

## 2024-02-02 PROCEDURE — 3017F COLORECTAL CA SCREEN DOC REV: CPT | Performed by: NURSE PRACTITIONER

## 2024-02-02 PROCEDURE — G8427 DOCREV CUR MEDS BY ELIG CLIN: HCPCS | Performed by: NURSE PRACTITIONER

## 2024-02-02 PROCEDURE — G8400 PT W/DXA NO RESULTS DOC: HCPCS | Performed by: NURSE PRACTITIONER

## 2024-02-02 RX ORDER — POLYETHYLENE GLYCOL 3350, SODIUM SULFATE ANHYDROUS, SODIUM BICARBONATE, SODIUM CHLORIDE, POTASSIUM CHLORIDE 236; 22.74; 6.74; 5.86; 2.97 G/4L; G/4L; G/4L; G/4L; G/4L
4 POWDER, FOR SOLUTION ORAL ONCE
Qty: 1 EACH | Refills: 0 | Status: SHIPPED | OUTPATIENT
Start: 2024-02-02 | End: 2024-02-02

## 2024-02-02 RX ORDER — DICYCLOMINE HYDROCHLORIDE 10 MG/1
10 CAPSULE ORAL 4 TIMES DAILY
Qty: 360 CAPSULE | Refills: 1 | Status: SHIPPED | OUTPATIENT
Start: 2024-02-02

## 2024-02-02 NOTE — PROGRESS NOTES
Sandhya Adame (:  1955) is a 68 y.o. female, here for evaluation of the following chief complaint(s):  New Patient (New patient- see in the ER for abdominal pain)      SUBJECTIVE/OBJECTIVE:  HPI:    Sandhya is a very pleasant 68 year old female that presents today for ER follow up on right upper and lower abdominal pain x 4 days    Presented to the ER on 23  The pain was radiating to the epigastric area; describes it as \"sharp stabbing pain\"  Patient had a cholecystectomy years ago  Followed up with Dr. Frederick; he gave the patient pain injections  This helped with the pain til [place], the pain returned  CT of the abdomen w/o contrast 23 showed-  No acute intra-abdominal findings are identified   Patient was prescribed Pantoprazole 40 mg daily that she states did not satisfy;  Carafate did not satisfy either  Not a smoker  No routine NSAID use  No alcohol intake    The pain is not related to meals; denies nausea, vomiting, dysphagia, or odynophagia    Last Thursday the pain worsened so she presented to the ER again  Was treated with gabapentin and lidocaine patches  This will decrease the pain \"slightly\"  Repeat CT scan is same as previous CT scan    Patient does not move her bowels daily, can go 4 days  Admits to changing her diet  Denies rectal bleeding or melena  No colonoscopy of to date    Patient is a diabetic; last A1C was 7.4%         ROS:  General: Patient denies n/v/f/c or weight loss.  HEENT: Patient denies persistent postnasal drip, scleral icterus, drooling, persistent bleeding from nose/mouth.  Resp: Patient denies SOB, wheezing, productive cough.  Cards: Patient denies CP, palpitations, significant edema  GI: As above.  Derm: Patient denies jaundice/rashes.   Musc: Patient denies diffuse/irregular joint swelling or myalgias.      Objective   Wt Readings from Last 3 Encounters:   24 64.9 kg (143 lb)   24 66.2 kg (146 lb)   24 66.1 kg (145 lb 12.8 oz)     Temp

## 2024-02-07 ENCOUNTER — TELEPHONE (OUTPATIENT)
Dept: ADMINISTRATIVE | Age: 69
End: 2024-02-07

## 2024-02-07 ENCOUNTER — OFFICE VISIT (OUTPATIENT)
Dept: SURGERY | Age: 69
End: 2024-02-07
Payer: MEDICARE

## 2024-02-07 VITALS
WEIGHT: 142.6 LBS | DIASTOLIC BLOOD PRESSURE: 72 MMHG | BODY MASS INDEX: 23.76 KG/M2 | OXYGEN SATURATION: 97 % | HEART RATE: 72 BPM | SYSTOLIC BLOOD PRESSURE: 133 MMHG | TEMPERATURE: 97.8 F | HEIGHT: 65 IN

## 2024-02-07 DIAGNOSIS — R10.9 ABDOMINAL PAIN OF MULTIPLE SITES: Primary | ICD-10-CM

## 2024-02-07 DIAGNOSIS — F06.4 ANXIETY DISORDER DUE TO GENERAL MEDICAL CONDITION: ICD-10-CM

## 2024-02-07 DIAGNOSIS — Z79.4 TYPE 2 DIABETES MELLITUS WITH CHRONIC KIDNEY DISEASE, WITH LONG-TERM CURRENT USE OF INSULIN, UNSPECIFIED CKD STAGE (HCC): ICD-10-CM

## 2024-02-07 DIAGNOSIS — E11.22 TYPE 2 DIABETES MELLITUS WITH CHRONIC KIDNEY DISEASE, WITH LONG-TERM CURRENT USE OF INSULIN, UNSPECIFIED CKD STAGE (HCC): ICD-10-CM

## 2024-02-07 DIAGNOSIS — Z01.818 PREOPERATIVE CLEARANCE: Primary | ICD-10-CM

## 2024-02-07 PROCEDURE — G8400 PT W/DXA NO RESULTS DOC: HCPCS | Performed by: SURGERY

## 2024-02-07 PROCEDURE — G8420 CALC BMI NORM PARAMETERS: HCPCS | Performed by: SURGERY

## 2024-02-07 PROCEDURE — 1123F ACP DISCUSS/DSCN MKR DOCD: CPT | Performed by: SURGERY

## 2024-02-07 PROCEDURE — 3017F COLORECTAL CA SCREEN DOC REV: CPT | Performed by: SURGERY

## 2024-02-07 PROCEDURE — 1090F PRES/ABSN URINE INCON ASSESS: CPT | Performed by: SURGERY

## 2024-02-07 PROCEDURE — 99204 OFFICE O/P NEW MOD 45 MIN: CPT | Performed by: SURGERY

## 2024-02-07 PROCEDURE — G8427 DOCREV CUR MEDS BY ELIG CLIN: HCPCS | Performed by: SURGERY

## 2024-02-07 PROCEDURE — 3051F HG A1C>EQUAL 7.0%<8.0%: CPT | Performed by: SURGERY

## 2024-02-07 PROCEDURE — 2022F DILAT RTA XM EVC RTNOPTHY: CPT | Performed by: SURGERY

## 2024-02-07 PROCEDURE — 1036F TOBACCO NON-USER: CPT | Performed by: SURGERY

## 2024-02-07 PROCEDURE — G8484 FLU IMMUNIZE NO ADMIN: HCPCS | Performed by: SURGERY

## 2024-02-07 NOTE — PROGRESS NOTES
Continuous Blood Gluc Sensor (FREESTYLE QUOC 3 SENSOR) MISC Apply every 14 days 2 each 3    pantoprazole (PROTONIX) 40 MG tablet Take 1 tablet by mouth daily 30 tablet 0    ondansetron (ZOFRAN) 4 MG tablet Take 1 tablet by mouth every 8 hours as needed for Nausea or Vomiting 20 tablet 0    glucagon, rDNA, (GLUCAGEN HYPOKIT) 1 MG SOLR injection Inject 1 mg into the skin once as needed for Low blood sugar 1 each 0    triamcinolone (KENALOG) 0.1 % cream Apply topically 2 times daily. 30 g 0    insulin detemir (LEVEMIR FLEXTOUCH) 100 UNIT/ML injection pen Inject 5 Units into the skin 2 times daily (Patient taking differently: Inject 7 Units into the skin 2 times daily 8 units daily) 9 mL 3    Continuous Blood Gluc Sensor (FREESTYLE QUOC 2 SENSOR) MISC Change sensor every 14 days 6 each 4    atorvastatin (LIPITOR) 20 MG tablet TAKE ONE TABLET BY MOUTH DAILY 30 tablet 11    insulin lispro, 1 Unit Dial, (HUMALOG KWIKPEN) 100 UNIT/ML SOPN Inject 4 units plus low SS before meals BS  150-200 = 1 units, 201-250 = 2 units, 251-300 = 3 units, 301-350 = 4 units, 351-400 = 5 units, 401 = 6 units 4 Adjustable Dose Pre-filled Pen Syringe 5    dapagliflozin (FARXIGA) 10 MG tablet Take 1 tablet by mouth every morning      nitroGLYCERIN (NITROSTAT) 0.4 MG SL tablet Place 1 tablet under the tongue every 5 minutes as needed for Chest pain up to max of 3 total doses. If no relief after 1 dose, call 911. 2 tablet 0    hydrALAZINE (APRESOLINE) 25 MG tablet Take 1 tablet by mouth every 8 hours Usually taken 2 x day (Patient taking differently: Take 1 tablet by mouth in the morning and 1 tablet in the evening. Usually taken 2 x day.) 90 tablet 1    verapamil (CALAN SR) 180 MG extended release tablet Take 1.5 tablets by mouth daily (Patient taking differently: Take 300 mg by mouth daily Pt taking total 300mg daily) 30 tablet 3    Multiple Vitamins-Minerals (CENTRUM SILVER 50+WOMEN) TABS Take by mouth      carvedilol (COREG) 6.25 MG tablet

## 2024-02-07 NOTE — H&P (VIEW-ONLY)
Patient's Name/Date of Birth: Sandhya Adame / 1955    Date: 2/7/2024    PCP: Edgardo Colon MD    Chief Complaint   Patient presents with    New Patient     RUQ pain moving towards middle of stomach        HPI:  69 YO female, insulin-dependent diabetic with Hx of CHF and anxiety, seen for evaluation of recent onset of generalised abdominal pain. Was seen in ED twice in the last 2 months for her symptoms. Also seen Dr Frederick and gastroenterology    Patient's medications, allergies, past medical, surgical, social and family histories were reviewed and updated as appropriate.    Allergies   Allergen Reactions    Synvisc [Hylan G-F 20]      Local swelling       Past Medical History:   Diagnosis Date    Anxiety     Bilateral carotid artery stenosis 10/07/2021    Bruit of left carotid artery 07/09/2020    CHF (congestive heart failure) (HCC)     Decreased dorsalis pedis pulse 07/09/2020    History of deep vein thrombosis 5/18/2023    Right peroneal vein, January 2023    Humeral fracture     right    Hypertension     Kidney disease     Left carotid stenosis 07/09/2020    Leg pain     Osteoarthritis of right knee 05/04/2011    Superficial phlebitis of leg, right 08/12/2021    Type II or unspecified type diabetes mellitus without mention of complication, not stated as uncontrolled     Varicose veins of leg with pain, right 08/12/2021        Past Surgical History:   Procedure Laterality Date    APPENDECTOMY      CHOLECYSTECTOMY      FRACTURE SURGERY Right     arm    KNEE ARTHROPLASTY Right 2011    Right TKA.  Karan Olmos MD        Social History     Tobacco Use    Smoking status: Never    Smokeless tobacco: Never   Substance Use Topics    Alcohol use: Never       Current Outpatient Medications   Medication Sig Dispense Refill    Insulin Disposable Pump (OMNIPOD 5 G6 INTRO, GEN 5,) KIT For insulin pump therapy 1 kit 0    Insulin Disposable Pump (OMNIPOD 5 G6 POD, GEN 5,) MISC Change every 3 days 30 each 4

## 2024-02-07 NOTE — TELEPHONE ENCOUNTER
Patient needs cardiac clearance for panendoscopy by Dr. Hicks.  Left message for patient to call the office to assess for any cardiac symptoms since last visit in August 2023.

## 2024-02-07 NOTE — TELEPHONE ENCOUNTER
Patient denies any chest pain, shortness of breath or palpitations since last visit in August 2023.  Patient is able to complete all activities of daily living, including; climbing one flight of stairs and walking one block without cardiac symptoms. Patient on Aspirin. Please advise.

## 2024-02-08 DIAGNOSIS — Z79.4 TYPE 2 DIABETES MELLITUS WITH HYPERGLYCEMIA, WITH LONG-TERM CURRENT USE OF INSULIN (HCC): Primary | ICD-10-CM

## 2024-02-08 DIAGNOSIS — E11.65 TYPE 2 DIABETES MELLITUS WITH HYPERGLYCEMIA, WITH LONG-TERM CURRENT USE OF INSULIN (HCC): Primary | ICD-10-CM

## 2024-02-08 RX ORDER — PROCHLORPERAZINE 25 MG/1
SUPPOSITORY RECTAL
Qty: 3 EACH | Refills: 5 | Status: SHIPPED | OUTPATIENT
Start: 2024-02-08

## 2024-02-08 RX ORDER — PROCHLORPERAZINE 25 MG/1
SUPPOSITORY RECTAL
Qty: 1 EACH | Refills: 1 | Status: SHIPPED | OUTPATIENT
Start: 2024-02-08

## 2024-02-08 NOTE — TELEPHONE ENCOUNTER
Patient's revised cardiac risk index is elevated (2), class III risk, 6.6% risk of major perioperative cardiac events.  Currently, patient denies any active chest pain, decompensated heart failure, uncontrolled arrhythmias or obstructive valve lesions.  Patient is able to do all activities of daily living without any chest pain or dyspnea.  No further cardiac workup is needed and proceed with endoscopies as scheduled.  Lexiscan nuclear stress test in June 2023 showed no ischemia or infarct with normal perfusion.  Continue Coreg perioperatively.

## 2024-02-12 ENCOUNTER — TELEPHONE (OUTPATIENT)
Dept: SURGERY | Age: 69
End: 2024-02-12

## 2024-02-12 DIAGNOSIS — Z12.11 SPECIAL SCREENING FOR MALIGNANT NEOPLASMS, COLON: ICD-10-CM

## 2024-02-12 DIAGNOSIS — R10.11 ABDOMINAL PAIN, RIGHT UPPER QUADRANT: ICD-10-CM

## 2024-02-12 NOTE — TELEPHONE ENCOUNTER
Prior Authorization Form:      DEMOGRAPHICS:                     Patient Name:  Azam Adame  Patient :  1955            Insurance:  Payor: [facility] MEDICARE / Plan: ANTHEM DUAL ADVANTAGE / Product Type: *No Product type* /   Insurance ID Number:    Payer/Plan Subscr  Sex Relation Sub. Ins. ID Effective Group Num   1. [facility] MEDICAREAZAM ABURTO 1955 Female Self [id number]                                      2. [employer] - * AZAM ADAME 1955 Female Self [id number]                                   [address]         DIAGNOSIS & PROCEDURE:                       Procedure/Operation: egd  colonoscopy           CPT Code: 69572   [id number]    Diagnosis:  abdomina; pain   screening    ICD10 Code: r10.11  z12.11    Location:  [place]    Surgeon:  ELLE BROWN    SCHEDULING INFORMATION:                          Date: 2024    Time: 10:30              Anesthesia:  MAC/TIVA                                                       Status:  Outpatient        Special Comments:         Electronically signed by Simona Watson MA on 2024 at 11:13 AM

## 2024-02-13 ENCOUNTER — TELEPHONE (OUTPATIENT)
Dept: ENDOCRINOLOGY | Age: 69
End: 2024-02-13

## 2024-02-14 ENCOUNTER — HOSPITAL ENCOUNTER (OUTPATIENT)
Dept: OTHER | Age: 69
Setting detail: THERAPIES SERIES
Discharge: HOME | End: 2024-02-14
Payer: MEDICARE

## 2024-02-14 VITALS
WEIGHT: 148 LBS | RESPIRATION RATE: 18 BRPM | DIASTOLIC BLOOD PRESSURE: 58 MMHG | BODY MASS INDEX: 24.63 KG/M2 | SYSTOLIC BLOOD PRESSURE: 116 MMHG | HEART RATE: 71 BPM | OXYGEN SATURATION: 100 %

## 2024-02-14 LAB
ANION GAP SERPL CALCULATED.3IONS-SCNC: 9 MMOL/L (ref 7–16)
BNP SERPL-MCNC: 304 PG/ML (ref 0–125)
BUN SERPL-MCNC: 26 MG/DL (ref 6–23)
CALCIUM SERPL-MCNC: 9.4 MG/DL (ref 8.6–10.2)
CHLORIDE SERPL-SCNC: 103 MMOL/L (ref 98–107)
CO2 SERPL-SCNC: 28 MMOL/L (ref 22–29)
CREAT SERPL-MCNC: 1.3 MG/DL (ref 0.5–1)
GFR SERPL CREATININE-BSD FRML MDRD: 47 ML/MIN/1.73M2
GLUCOSE SERPL-MCNC: 58 MG/DL (ref 74–99)
POTASSIUM SERPL-SCNC: 4.1 MMOL/L (ref 3.5–5)
SODIUM SERPL-SCNC: 140 MMOL/L (ref 132–146)

## 2024-02-14 PROCEDURE — 80048 BASIC METABOLIC PNL TOTAL CA: CPT

## 2024-02-14 PROCEDURE — 83880 ASSAY OF NATRIURETIC PEPTIDE: CPT

## 2024-02-14 PROCEDURE — 99214 OFFICE O/P EST MOD 30 MIN: CPT

## 2024-02-14 NOTE — PLAN OF CARE
Problem: Chronic Conditions and Co-morbidities  Goal: Patient's chronic conditions and co-morbidity symptoms are monitored and maintained or improved  Flowsheets (Taken 2/14/2024 9789)  Care Plan - Patient's Chronic Conditions and Co-Morbidity Symptoms are Monitored and Maintained or Improved: Monitor and assess patient's chronic conditions and comorbid symptoms for stability, deterioration, or improvement

## 2024-02-14 NOTE — PROGRESS NOTES
Congestive Heart Failure Clinic   [facility]       Referring Provider: -  Primary Care Physician: Edgardo Colon MD   Cardiologist: Dr. Sanchez  Nephrologist: Dr. Mayes      HISTORY OF PRESENT ILLNESS:     Sandhya Adame is a 68 y.o. (1955) female with a history of HFpEF, most recent EF:  Lab Results   Component Value Date    LVEF 86 06/28/2023         She presents to the CHF clinic for ongoing evaluation and monitoring of heart failure.    In the CHF clinic today she denies any adverse symptoms except:  [] Dizziness or lightheadedness   [] Syncope or near syncope  [] Recent Fall  [] Shortness of breath at rest   [] Dyspnea with exertion  [] Decline in functional capacity (unable to perform activities they had previously been able to do)  [] Fatigue   [] Orthopnea  [] PND  [] Nocturnal cough  [] Hemoptysis  [] Chest pain, pressure, or discomfort  [] Palpitations  [] Abdominal distention  [] Abdominal bloating  [] Early satiety  [] Blood in stool   [] Diarrhea  [] Constipation  [] Nausea/Vomiting  [] Decreased urinary response to oral diuretic   [] Scrotal swelling   [] Lower extremity edema  [] Used PRN doses of oral diuretic   [x] Weight gain 2/14/24- 3 pounds over 2 months              Wt Readings from Last 3 Encounters:   02/13/24 65.8 kg (145 lb)   02/14/24 67.1 kg (148 lb)   02/07/24 64.7 kg (142 lb 9.6 oz)           SOCIAL HISTORY:  [x] Denies tobacco, alcohol or illicit drug abuse  [] Tobacco use:  [] ETOH use:  [] Illicit drug use:        MEDICATIONS:    Allergies   Allergen Reactions    Synvisc [Hylan G-F 20]      Local swelling       Current Outpatient Medications:     Continuous Blood Gluc Sensor (DEXCOM G6 SENSOR) MISC, Change sensor every 10 days, Disp: 3 each, Rfl: 5    Continuous Blood Gluc Transmit (DEXCOM G6 TRANSMITTER) MISC, Change every 90 days, Disp: 1 each, Rfl: 1    dicyclomine (BENTYL) 10 MG capsule, Take 1 capsule by mouth 4 times daily (Patient

## 2024-02-14 NOTE — PROGRESS NOTES
2/14/24- Pt's bs during visit was 65. Her jody on phone warned her. She ate Kaykay doone cookies before leaving and bs coming up.    LEXII Sanford RN.

## 2024-02-19 ENCOUNTER — HOSPITAL ENCOUNTER (OUTPATIENT)
Age: 69
Setting detail: OUTPATIENT SURGERY
Discharge: HOME | End: 2024-02-19
Attending: SURGERY | Admitting: SURGERY
Payer: MEDICARE

## 2024-02-19 ENCOUNTER — ANESTHESIA (OUTPATIENT)
Dept: ENDOSCOPY | Age: 69
End: 2024-02-19
Payer: MEDICARE

## 2024-02-19 ENCOUNTER — ANESTHESIA EVENT (OUTPATIENT)
Dept: ENDOSCOPY | Age: 69
End: 2024-02-19
Payer: MEDICARE

## 2024-02-19 VITALS
HEART RATE: 70 BPM | RESPIRATION RATE: 18 BRPM | OXYGEN SATURATION: 99 % | WEIGHT: 145 LBS | TEMPERATURE: 98.3 F | BODY MASS INDEX: 24.16 KG/M2 | DIASTOLIC BLOOD PRESSURE: 70 MMHG | SYSTOLIC BLOOD PRESSURE: 159 MMHG | HEIGHT: 65 IN

## 2024-02-19 DIAGNOSIS — Z12.11 SPECIAL SCREENING FOR MALIGNANT NEOPLASMS, COLON: ICD-10-CM

## 2024-02-19 DIAGNOSIS — R10.11 ABDOMINAL PAIN, RIGHT UPPER QUADRANT: ICD-10-CM

## 2024-02-19 LAB — GLUCOSE BLD-MCNC: 271 MG/DL (ref 74–99)

## 2024-02-19 PROCEDURE — 43239 EGD BIOPSY SINGLE/MULTIPLE: CPT | Performed by: SURGERY

## 2024-02-19 PROCEDURE — 45378 DIAGNOSTIC COLONOSCOPY: CPT | Performed by: SURGERY

## 2024-02-19 PROCEDURE — 2580000003 HC RX 258: Performed by: NURSE ANESTHETIST, CERTIFIED REGISTERED

## 2024-02-19 PROCEDURE — 88305 TISSUE EXAM BY PATHOLOGIST: CPT

## 2024-02-19 PROCEDURE — 6360000002 HC RX W HCPCS: Performed by: NURSE ANESTHETIST, CERTIFIED REGISTERED

## 2024-02-19 PROCEDURE — 7100000011 HC PHASE II RECOVERY - ADDTL 15 MIN: Performed by: SURGERY

## 2024-02-19 PROCEDURE — 7100000010 HC PHASE II RECOVERY - FIRST 15 MIN: Performed by: SURGERY

## 2024-02-19 PROCEDURE — 2709999900 HC NON-CHARGEABLE SUPPLY: Performed by: SURGERY

## 2024-02-19 PROCEDURE — 3609027000 HC COLONOSCOPY: Performed by: SURGERY

## 2024-02-19 PROCEDURE — 82962 GLUCOSE BLOOD TEST: CPT

## 2024-02-19 PROCEDURE — 3609012400 HC EGD TRANSORAL BIOPSY SINGLE/MULTIPLE: Performed by: SURGERY

## 2024-02-19 PROCEDURE — 3700000001 HC ADD 15 MINUTES (ANESTHESIA): Performed by: SURGERY

## 2024-02-19 PROCEDURE — 3700000000 HC ANESTHESIA ATTENDED CARE: Performed by: SURGERY

## 2024-02-19 RX ORDER — SODIUM CHLORIDE 9 MG/ML
INJECTION, SOLUTION INTRAVENOUS CONTINUOUS
Status: DISCONTINUED | OUTPATIENT
Start: 2024-02-19 | End: 2024-02-19 | Stop reason: HOSPADM

## 2024-02-19 RX ORDER — SODIUM CHLORIDE 0.9 % (FLUSH) 0.9 %
5-40 SYRINGE (ML) INJECTION EVERY 12 HOURS SCHEDULED
Status: DISCONTINUED | OUTPATIENT
Start: 2024-02-19 | End: 2024-02-19 | Stop reason: HOSPADM

## 2024-02-19 RX ORDER — SODIUM CHLORIDE 0.9 % (FLUSH) 0.9 %
5-40 SYRINGE (ML) INJECTION PRN
Status: DISCONTINUED | OUTPATIENT
Start: 2024-02-19 | End: 2024-02-19 | Stop reason: HOSPADM

## 2024-02-19 RX ORDER — SODIUM CHLORIDE 9 MG/ML
25 INJECTION, SOLUTION INTRAVENOUS PRN
Status: DISCONTINUED | OUTPATIENT
Start: 2024-02-19 | End: 2024-02-19 | Stop reason: HOSPADM

## 2024-02-19 RX ORDER — MIDAZOLAM HYDROCHLORIDE 1 MG/ML
INJECTION INTRAMUSCULAR; INTRAVENOUS PRN
Status: DISCONTINUED | OUTPATIENT
Start: 2024-02-19 | End: 2024-02-19 | Stop reason: SDUPTHER

## 2024-02-19 RX ORDER — PROPOFOL 10 MG/ML
INJECTION, EMULSION INTRAVENOUS PRN
Status: DISCONTINUED | OUTPATIENT
Start: 2024-02-19 | End: 2024-02-19 | Stop reason: SDUPTHER

## 2024-02-19 RX ORDER — SODIUM CHLORIDE 9 MG/ML
INJECTION, SOLUTION INTRAVENOUS CONTINUOUS PRN
Status: DISCONTINUED | OUTPATIENT
Start: 2024-02-19 | End: 2024-02-19 | Stop reason: SDUPTHER

## 2024-02-19 RX ADMIN — PROPOFOL 50 MG: 10 INJECTION, EMULSION INTRAVENOUS at 10:45

## 2024-02-19 RX ADMIN — PROPOFOL 50 MG: 10 INJECTION, EMULSION INTRAVENOUS at 10:30

## 2024-02-19 RX ADMIN — PROPOFOL 50 MG: 10 INJECTION, EMULSION INTRAVENOUS at 10:27

## 2024-02-19 RX ADMIN — MIDAZOLAM 2 MG: 1 INJECTION INTRAMUSCULAR; INTRAVENOUS at 10:40

## 2024-02-19 RX ADMIN — PROPOFOL 50 MG: 10 INJECTION, EMULSION INTRAVENOUS at 10:35

## 2024-02-19 RX ADMIN — PROPOFOL 50 MG: 10 INJECTION, EMULSION INTRAVENOUS at 10:40

## 2024-02-19 RX ADMIN — PROPOFOL 100 MG: 10 INJECTION, EMULSION INTRAVENOUS at 10:23

## 2024-02-19 RX ADMIN — PROPOFOL 50 MG: 10 INJECTION, EMULSION INTRAVENOUS at 10:19

## 2024-02-19 RX ADMIN — SODIUM CHLORIDE: 9 INJECTION, SOLUTION INTRAVENOUS at 10:16

## 2024-02-19 ASSESSMENT — PAIN - FUNCTIONAL ASSESSMENT
PAIN_FUNCTIONAL_ASSESSMENT: 0-10
PAIN_FUNCTIONAL_ASSESSMENT: NONE - DENIES PAIN

## 2024-02-19 NOTE — ANESTHESIA POSTPROCEDURE EVALUATION
Department of Anesthesiology  Postprocedure Note    Patient: Sandhya Adame  MRN: [id number]  YOB: 1955  Date of evaluation: 2/19/2024    Procedure Summary       Date: 02/19/24 Room / Location: [place] [address] / [facility]    Anesthesia Start: 1016 Anesthesia Stop: 1059    Procedures:       EGD BIOPSY      COLORECTAL CANCER SCREENING, NOT HIGH RISK Diagnosis:       Abdominal pain, right upper quadrant      Special screening for malignant neoplasms, colon      (Abdominal pain, right upper quadrant [R10.11])      (Special screening for malignant neoplasms, colon [Z12.11])    Surgeons: Kary Hicks MD Responsible Provider: Kevin Dumont MD    Anesthesia Type: MAC ASA Status: 4            Anesthesia Type: No value filed.    Kassie Phase I:      Kassie Phase II:      Anesthesia Post Evaluation    Patient location during evaluation: bedside  Patient participation: complete - patient participated  Level of consciousness: awake and alert  Pain score: 0  Airway patency: patent  Nausea & Vomiting: no nausea and no vomiting  Cardiovascular status: hemodynamically stable  Respiratory status: acceptable  Hydration status: euvolemic  Pain management: adequate        No notable events documented.

## 2024-02-19 NOTE — DISCHARGE INSTRUCTIONS
[facility] AMBULATORY PROCEDURE DISCHARGE INSTRUCTIONS  You may be drowsy or lightheaded after receiving sedation or anesthesia.    A responsible person should be with you for the next 24 hours.    Please follow the instructions checked below:    DIET INSTRUCTIONS:  [x]Start with light diet and progress to your normal diet as you feel like eating. If you experience nausea or repeated episodes of vomiting which persist beyond 12-24 hours, notify your doctor.  []Other     ACTIVITY INSTRUCTIONS:  [x]Rest today. Increase activity as tolerated    []No heavy lifting or strenuous activity     [x]No driving for today  []Other      MEDICATION INSTRUCTIONS:    [x]Prescriptions sent with you.  Use as directed.  When taking pain medications, you may experience dizziness or drowsiness.  Do not drink alcohol or drive when taking these medications.  [x]Continue preop medications                               Post-procedure Care   If any tissue was removed:   It will be sent to a lab to be examined. It may take 1-2 weeks for results. The doctor will usually give an initial report after the scope is removed. Other tests may be recommended.   A small amount of bleeding may occur during the first few days after the procedure.     When you return home after the procedure, be sure to follow your doctor's instructions, which may include:   Resume medicines as instructed by your doctor.   Resume normal diet, unless directed otherwise by your doctor.   The sedative will make you drowsy. Avoid driving, operating machinery, or making important decisions for the rest of the day.   Rest for the remainder of the day.     After arriving home, contact your doctor if any of the following occurs:   Bleeding from your rectum, notify your doctor if you pass a teaspoonful of blood or more.   Black, tarry stools   Severe abdominal pain   Hard, swollen abdomen   Signs of infection, including fever or chills   Inability to pass gas or

## 2024-02-19 NOTE — BRIEF OP NOTE
Brief Postoperative Note      Patient: Sandhya Adame  YOB: 1955  MRN: [id number]    Date of Procedure: 2/19/2024    Pre-Op Diagnosis Codes:     * Abdominal pain, right upper quadrant [R10.11]     * Special screening for malignant neoplasms, colon [Z12.11]    Post-Op Diagnosis: Same       Procedure(s):  EGD BIOPSY  COLORECTAL CANCER SCREENING, NOT HIGH RISK    Surgeon(s):  Kary Hicks MD    Assistant:  * No surgical staff found *    Anesthesia: Monitor Anesthesia Care    Estimated Blood Loss (mL): Minimal    Complications: None    Specimens:   ID Type Source Tests Collected by Time Destination   A : duodenal bx Tissue Duodenum SURGICAL PATHOLOGY Kary Hicks MD 2/19/2024 1024    B : antral bx Antrum Antrum SURGICAL PATHOLOGY Kary Hicks MD 2/19/2024 1025        Implants:  * No implants in log *      Drains: * No LDAs found *    Findings:       Electronically signed by Kary Hicks MD on 2/19/2024 at 10:59 AM

## 2024-02-19 NOTE — INTERVAL H&P NOTE
[facility]  Mirela Flores  : 1991  MRN: [id number]  CSN: [id number]    History and Physical    Subjective   Mirela Flores is a 26 y.o. year old  with an Estimated Date of Delivery: 17 scheduled for induction of labor on 17 due to post dates pregnancy with favorable cervix.  Prenatal care has been with Dr. Roosevelt Stevens.  It has been benign.    Obstetric History       T2      TAB0   SAB1   E0   M0   L2       # Outcome Date GA Lbr Parish/2nd Weight Sex Delivery Anes PTL Lv   4 Current            3 SAB 16 7w0d    SAB      2 Term 14 39w0d  7 lb 6 oz (3.345 kg) M Vag-Spont EPI N Y   1 Term 13 39w0d  7 lb 6 oz (3.345 kg) M Vag-Spont EPI Y Y      Complications: Postpartum hemorrhage        No past medical history on file.  Past Surgical History:   Procedure Laterality Date   • CHOLECYSTECTOMY     • D&C WITH SUCTION     • OVARIAN CYST SURGERY Right    • TYMPANOSTOMY TUBE PLACEMENT         Current Outpatient Prescriptions:   •  ferrous sulfate 325 (65 FE) MG tablet, Take 325 mg by mouth Daily With Breakfast., Disp: , Rfl:   •  Prenatal Vit-Fe Fumarate-FA (PRENATAL, CLASSIC, VITAMIN) 28-0.8 MG tablet tablet, Take  by mouth Daily., Disp: , Rfl:   •  promethazine (PHENERGAN) 25 MG tablet, Take 1 tablet by mouth Every 6 (Six) Hours As Needed for nausea or vomiting., Disp: 30 tablet, Rfl: 3  •  raNITIdine (ZANTAC) 150 MG tablet, Take 150 mg by mouth 2 (Two) Times a Day., Disp: , Rfl:     Allergies   Allergen Reactions   • Cephalosporins    • Penicillins      Smoking status: Never Smoker                                                              Smokeless status: Never Used                        Review of Systems      Objective   LMP 2016  General: well developed; well nourished  no acute distress   Heart: regular rate and rhythm   Lungs: breathing is unlabored   Abdomen:  Cervix: soft, non-tender; no masses  was checked: 4 cm / 50 % / -3  Estimated weight 8#    Update History & Physical    The patient's History and Physical of February 7, 2024 was reviewed with the patient and I examined the patient. There was no change. The surgical site was confirmed by the patient and me.     Plan: The risks, benefits, expected outcome, and alternative to the recommended procedure have been discussed with the patient. Patient understands and wants to proceed with the procedure.     Electronically signed by Kary Hicks MD on 2/19/2024 at 9:46 AM           Prenatal Labs  Lab Results   Component Value Date    HGB 12.2 2016    ABORH O Rh Positive 2016       Recent Labs  Lab Results   Component Value Date    HGB 12.2 2016    HCT 34.9 (L) 2016    WBC 7.60 2016     2016           Assessment   1. IUP with an Estimated Date of Delivery: 17  2. Induction of labor scheduled on 17 for post dates pregnancy.         Plan   1. Risks and benefits of induction discussed.  Patient understands that IOL increases the risk of  delivery over spontaneous labor, especially if the patient does not have a favorable cervix.    Margarito Stevens MD  2017

## 2024-02-19 NOTE — PROGRESS NOTES
DISCHARGE INSTRUCTIONS COMPLETED , VSS  PT QUESTIONS ANSWERED , WAITING TO SPEAK WITH DR BROWN, PT AND FAMILY VERBALIZED UNDERSTANDING OF INSTRUCTIONS PAMPHLETS GIVEN  1210 DR BROWN HERE AND SPOKE WITH PT AND FAMILY AND UPDATED ON PROCEDURE AND FOLLOW UP, PT WANTING TO FINISH TEA BEFORE LEAVING

## 2024-02-19 NOTE — ANESTHESIA PRE PROCEDURE
for: \"PREGTESTUR\", \"PREGSERUM\", \"HCG\", \"HCGQUANT\"     ABGs: No results found for: \"PHART\", \"PO2ART\", \"FHY5ROB\", \"FYD3JZU\", \"BEART\", \"P3XYPRMG\"     Type & Screen (If Applicable):  No results found for: \"LABABO\", \"LABRH\"    Drug/Infectious Status (If Applicable):  No results found for: \"HIV\", \"HEPCAB\"    COVID-19 Screening (If Applicable): No results found for: \"COVID19\"        Anesthesia Evaluation  Patient summary reviewed no history of anesthetic complications:   Airway: Mallampati: I  TM distance: >3 FB   Neck ROM: full     Dental:    (+) lower dentures and upper dentures      Pulmonary:Negative Pulmonary ROS breath sounds clear to auscultation                             Cardiovascular:    (+) hypertension:, CHF:, hyperlipidemia        Rhythm: regular  Rate: normal  Echocardiogram reviewed         Beta Blocker:  Not on Beta Blocker         Neuro/Psych:   (+) neuromuscular disease (Diabetic neuropathy (HCC)):, depression/anxiety             GI/Hepatic/Renal:   (+) renal disease: CRI, bowel prep,           Endo/Other:    (+) DiabetesType II DM, using insulin, .                 Abdominal:             Vascular:   + PVD, aortic or cerebral (Bilateral carotid artery stenosis), DVT, .       Other Findings:           Anesthesia Plan      MAC     ASA 4       Induction: intravenous.      Anesthetic plan and risks discussed with patient.      Plan discussed with CRNA.                    Kevin Dumont MD   2/19/2024    Agree with above.  NEWTON Graves - CRNA

## 2024-02-20 NOTE — OP NOTE
[place] [address]                                OPERATIVE REPORT    PATIENT NAME: AZAM MOORE                       :        1955  MED REC NO:   [id number]                            ROOM:  ACCOUNT NO:   [id number]                           ADMIT DATE: 2024  PROVIDER:     Kary Hicks MD    DATE OF PROCEDURE:  2024    PREOPERATIVE DIAGNOSIS:  Recurrent abdominal pain and nausea.    POSTOPERATIVE DIAGNOSES:  1.  Antral gastritis.  2.  Sigmoid diverticulosis.    PROCEDURES PERFORMED:  1.  Esophagogastroduodenoscopy with gastric and duodenal biopsies.  2.  Total colonoscopy to cecum.    SURGEON:  Kary Hicks M.D.    DESCRIPTION OF PROCEDURE:  With the patient in the left lateral position  on the operating table, after adequate sedation was administered by  Anesthesia, a standard Olympus gastroscope was introduced through the  mouth and advanced into the esophagus.  There was no evidence of an  esophageal pathology.  GE junction was normal.  Stomach revealed normal  gastric folds.  Mild antral gastritis was noted.  Biopsies were taken  for H. pylori.  Pyloric opening was normal.  Visualization of the first,  second, third, and fourth parts of the duodenum showed no evidence of  ulcers, bleeding, or other pathology.  The scope was slowly withdrawn  and totally removed.  The patient tolerated the procedure well.    Digital rectal examination and dilatation was performed, showed evidence  of good sphincter tone.  There were no palpable masses and no blood on  the examining finger.  A pediatric Olympus colonoscope was introduced  through the anal opening and advanced into the rectosigmoid.  The anus  and rectum revealed grade 2 internal hemorrhoids with no ulceration or  active bleeding.  The scope was advanced in the sigmoid colon revealing  diverticulosis without evidence of diverticulitis.  Remainder

## 2024-02-22 LAB — SURGICAL PATHOLOGY REPORT: NORMAL

## 2024-02-29 ENCOUNTER — OFFICE VISIT (OUTPATIENT)
Dept: CARDIOLOGY CLINIC | Age: 69
End: 2024-02-29

## 2024-02-29 VITALS
HEIGHT: 65 IN | DIASTOLIC BLOOD PRESSURE: 60 MMHG | BODY MASS INDEX: 24.47 KG/M2 | RESPIRATION RATE: 16 BRPM | HEART RATE: 69 BPM | WEIGHT: 146.9 LBS | SYSTOLIC BLOOD PRESSURE: 120 MMHG

## 2024-02-29 DIAGNOSIS — I50.9 CONGESTIVE HEART FAILURE OF UNKNOWN ETIOLOGY (HCC): Primary | ICD-10-CM

## 2024-02-29 NOTE — PROGRESS NOTES
Component Value Date    CREATININE 1.3 (H) 02/14/2024    BUN 26 (H) 02/14/2024     02/14/2024    K 4.1 02/14/2024     02/14/2024    CO2 28 02/14/2024     Lab Results   Component Value Date/Time    MG 2.5 09/19/2023 10:43 AM     Lab Results   Component Value Date    WBC 6.1 01/25/2024    HGB 11.3 (L) 01/25/2024    HCT 35.6 01/25/2024    MCV 92.7 01/25/2024     01/25/2024     Lab Results   Component Value Date    ALT 14 01/25/2024    AST 18 01/25/2024    ALKPHOS 85 01/25/2024    BILITOT 0.5 01/25/2024     Lab Results   Component Value Date    TROPONINI 0.01 04/09/2020    TROPONINI <0.01 02/09/2019     No results found for: \"INR\", \"PROTIME\"  Lab Results   Component Value Date    TSH 1.880 02/06/2023     Lab Results   Component Value Date    LABA1C 7.4 01/11/2024     No results found for: \"EAG\"  No results found for: \"CHOL\"  No results found for: \"TRIG\"  No results found for: \"HDL\"  No results found for: \"LDLCALC\", \"LDLCHOLESTEROL\"  No results found for: \"VLDL\"  No results found for: \"CHOLHDLRATIO\"    Cardiac Tests:  ECG: Normal sinus rhythm, normal EKG.    CTA chest-1/4/2023: No pulmonary embolism, bilateral moderate pleural effusions, with bibasilar infiltrate or subsegmental atelectasis small 5 mm nodule     TTE-1/6/2023:  Normal left ventricle size and systolic function.   Ejection fraction is visually estimated at 60-65%.   No regional wall motion abnormalities seen.   Normal left ventricle wall thickness.   Indeterminate diastolic function.   Normal right ventricular size and function. TAPSE 25 mm.   No hemodynamically significant aortic stenosis is present.   Physiologic and/or trace tricuspid regurgitation.   RVSP is 33 mmHg. Normal estimated PA systolic pressure.   No evidence for hemodynamically significant pericardial effusion.       Lexiscan nuclear stress test -6/27/23:    No myocardial ischemia.  No myocardial infarction.  Normal LV systolic function.  No wall motion

## 2024-02-29 NOTE — PATIENT INSTRUCTIONS
Continue Coreg 6.25 mg p.o. twice daily along with hydralazine 25 mg p.o. twice daily and verapamil   mg  p.o. daily for hypertension  Continue  atorvastatin 20 mg p.o. daily due to underlying diabetes.  Management of diabetes as per endocrinology service  Continue furosemide 20 mg p.o. daily for leg edema and HFpEF  Follow up with Dr. Hicks for abdominal pain and antral gastritis.   Advised to use compression stockings.   Follow up with Dr. Mayes as scheduled.   Follow-up with me in 6 months

## 2024-03-06 ENCOUNTER — OFFICE VISIT (OUTPATIENT)
Dept: SURGERY | Age: 69
End: 2024-03-06
Payer: MEDICARE

## 2024-03-06 VITALS
SYSTOLIC BLOOD PRESSURE: 138 MMHG | DIASTOLIC BLOOD PRESSURE: 61 MMHG | HEART RATE: 61 BPM | HEIGHT: 65 IN | OXYGEN SATURATION: 98 % | WEIGHT: 145.8 LBS | BODY MASS INDEX: 24.29 KG/M2 | TEMPERATURE: 97.9 F

## 2024-03-06 DIAGNOSIS — R10.9 ABDOMINAL PAIN OF MULTIPLE SITES: Primary | ICD-10-CM

## 2024-03-06 PROCEDURE — 3017F COLORECTAL CA SCREEN DOC REV: CPT | Performed by: SURGERY

## 2024-03-06 PROCEDURE — 99213 OFFICE O/P EST LOW 20 MIN: CPT | Performed by: SURGERY

## 2024-03-06 PROCEDURE — G8484 FLU IMMUNIZE NO ADMIN: HCPCS | Performed by: SURGERY

## 2024-03-06 PROCEDURE — G8400 PT W/DXA NO RESULTS DOC: HCPCS | Performed by: SURGERY

## 2024-03-06 PROCEDURE — 1036F TOBACCO NON-USER: CPT | Performed by: SURGERY

## 2024-03-06 PROCEDURE — 1090F PRES/ABSN URINE INCON ASSESS: CPT | Performed by: SURGERY

## 2024-03-06 PROCEDURE — G8427 DOCREV CUR MEDS BY ELIG CLIN: HCPCS | Performed by: SURGERY

## 2024-03-06 PROCEDURE — G8420 CALC BMI NORM PARAMETERS: HCPCS | Performed by: SURGERY

## 2024-03-06 PROCEDURE — 1123F ACP DISCUSS/DSCN MKR DOCD: CPT | Performed by: SURGERY

## 2024-03-06 NOTE — PROGRESS NOTES
pathology results and endoscopy results    Assessment/Plan:  Sandhya was seen today for follow-up and colonoscopy.    Diagnoses and all orders for this visit:    Abdominal pain of multiple sites,muscular in nature  Continue to monitor        No follow-ups on file.    Kary Hicks MD

## 2024-03-13 PROBLEM — Z12.11 SPECIAL SCREENING FOR MALIGNANT NEOPLASMS, COLON: Status: RESOLVED | Noted: 2024-02-12 | Resolved: 2024-03-13

## 2024-03-21 ENCOUNTER — TELEPHONE (OUTPATIENT)
Dept: ENDOCRINOLOGY | Age: 69
End: 2024-03-21

## 2024-03-21 DIAGNOSIS — Z79.4 TYPE 2 DIABETES MELLITUS WITH HYPERGLYCEMIA, WITH LONG-TERM CURRENT USE OF INSULIN (HCC): ICD-10-CM

## 2024-03-21 DIAGNOSIS — E11.65 TYPE 2 DIABETES MELLITUS WITH HYPERGLYCEMIA, WITH LONG-TERM CURRENT USE OF INSULIN (HCC): ICD-10-CM

## 2024-03-21 RX ORDER — INSULIN LISPRO 100 [IU]/ML
INJECTION, SOLUTION INTRAVENOUS; SUBCUTANEOUS
Qty: 90 ML | Refills: 3 | Status: SHIPPED | OUTPATIENT
Start: 2024-03-21

## 2024-03-21 RX ORDER — PROCHLORPERAZINE 25 MG/1
SUPPOSITORY RECTAL
Qty: 1 EACH | Refills: 1 | Status: SHIPPED | OUTPATIENT
Start: 2024-03-21

## 2024-03-21 RX ORDER — PROCHLORPERAZINE 25 MG/1
SUPPOSITORY RECTAL
Qty: 3 EACH | Refills: 5 | Status: SHIPPED | OUTPATIENT
Start: 2024-03-21

## 2024-04-16 ENCOUNTER — OFFICE VISIT (OUTPATIENT)
Dept: ENDOCRINOLOGY | Age: 69
End: 2024-04-16
Payer: MEDICARE

## 2024-04-16 VITALS — BODY MASS INDEX: 23.99 KG/M2 | WEIGHT: 144 LBS | HEIGHT: 65 IN

## 2024-04-16 DIAGNOSIS — E11.42 TYPE 2 DIABETES MELLITUS WITH POLYNEUROPATHY (HCC): ICD-10-CM

## 2024-04-16 DIAGNOSIS — E11.65 TYPE 2 DIABETES MELLITUS WITH HYPERGLYCEMIA, WITH LONG-TERM CURRENT USE OF INSULIN (HCC): Primary | ICD-10-CM

## 2024-04-16 DIAGNOSIS — N18.32 STAGE 3B CHRONIC KIDNEY DISEASE (HCC): ICD-10-CM

## 2024-04-16 DIAGNOSIS — Z79.4 TYPE 2 DIABETES MELLITUS WITH HYPERGLYCEMIA, WITH LONG-TERM CURRENT USE OF INSULIN (HCC): Primary | ICD-10-CM

## 2024-04-16 LAB — HBA1C MFR BLD: 7.7 %

## 2024-04-16 PROCEDURE — 95251 CONT GLUC MNTR ANALYSIS I&R: CPT | Performed by: NURSE PRACTITIONER

## 2024-04-16 PROCEDURE — 99214 OFFICE O/P EST MOD 30 MIN: CPT | Performed by: NURSE PRACTITIONER

## 2024-04-16 PROCEDURE — 1123F ACP DISCUSS/DSCN MKR DOCD: CPT | Performed by: NURSE PRACTITIONER

## 2024-04-16 PROCEDURE — 83036 HEMOGLOBIN GLYCOSYLATED A1C: CPT | Performed by: NURSE PRACTITIONER

## 2024-04-16 PROCEDURE — 3051F HG A1C>EQUAL 7.0%<8.0%: CPT | Performed by: NURSE PRACTITIONER

## 2024-04-16 NOTE — PROGRESS NOTES
addressed medical conditions.     Return in about 3 months (around 7/16/2024) for Type 2 DM .    The above issues were reviewed with the patient who understood and agreed with the plan.    Thank you for allowing us to participate in the care of this patient. Please do not hesitate to contact us with any additional questions.     NEWTON Alex NP    [facility]   [address], [place] [address]  Phone: [phone]  Fax: [phone]  --------------------------------------------  An electronic signature was used to authenticate this note. NEWTON Alex NP on 4/16/2024 at 11:28 AM

## 2024-04-17 ENCOUNTER — HOSPITAL ENCOUNTER (OUTPATIENT)
Dept: OTHER | Age: 69
Setting detail: THERAPIES SERIES
Discharge: HOME | End: 2024-04-17
Payer: MEDICARE

## 2024-04-17 ENCOUNTER — HOSPITAL ENCOUNTER (OUTPATIENT)
Age: 69
Discharge: HOME | End: 2024-04-17
Payer: MEDICARE

## 2024-04-17 VITALS
RESPIRATION RATE: 18 BRPM | WEIGHT: 144 LBS | BODY MASS INDEX: 23.96 KG/M2 | DIASTOLIC BLOOD PRESSURE: 58 MMHG | SYSTOLIC BLOOD PRESSURE: 124 MMHG | OXYGEN SATURATION: 100 % | HEART RATE: 70 BPM

## 2024-04-17 LAB
25(OH)D3 SERPL-MCNC: 43.1 NG/ML (ref 30–100)
ALBUMIN SERPL-MCNC: 4.7 G/DL (ref 3.5–5.2)
ALP SERPL-CCNC: 87 U/L (ref 35–104)
ALT SERPL-CCNC: 27 U/L (ref 0–32)
ANION GAP SERPL CALCULATED.3IONS-SCNC: 16 MMOL/L (ref 7–16)
AST SERPL-CCNC: 25 U/L (ref 0–31)
BASOPHILS # BLD: 0.07 K/UL (ref 0–0.2)
BASOPHILS NFR BLD: 1 % (ref 0–2)
BILIRUB SERPL-MCNC: 0.7 MG/DL (ref 0–1.2)
BILIRUB UR QL STRIP: NEGATIVE
BNP SERPL-MCNC: 186 PG/ML (ref 0–125)
BUN SERPL-MCNC: 34 MG/DL (ref 6–23)
CALCIUM SERPL-MCNC: 10 MG/DL (ref 8.6–10.2)
CHLORIDE SERPL-SCNC: 96 MMOL/L (ref 98–107)
CLARITY UR: CLEAR
CO2 SERPL-SCNC: 24 MMOL/L (ref 22–29)
COLOR UR: YELLOW
CREAT SERPL-MCNC: 1.2 MG/DL (ref 0.5–1)
CREAT UR-MCNC: 20.3 MG/DL (ref 29–226)
EOSINOPHIL # BLD: 0.34 K/UL (ref 0.05–0.5)
EOSINOPHILS RELATIVE PERCENT: 6 % (ref 0–6)
ERYTHROCYTE [DISTWIDTH] IN BLOOD BY AUTOMATED COUNT: 13.4 % (ref 11.5–15)
FERRITIN SERPL-MCNC: 34 NG/ML
GFR SERPL CREATININE-BSD FRML MDRD: 49 ML/MIN/1.73M2
GLUCOSE SERPL-MCNC: 128 MG/DL (ref 74–99)
GLUCOSE UR STRIP-MCNC: >=1000 MG/DL
HCT VFR BLD AUTO: 39 % (ref 34–48)
HGB BLD-MCNC: 12.3 G/DL (ref 11.5–15.5)
HGB UR QL STRIP.AUTO: NEGATIVE
IMM GRANULOCYTES # BLD AUTO: <0.03 K/UL (ref 0–0.58)
IMM GRANULOCYTES NFR BLD: 0 % (ref 0–5)
IRON SATN MFR SERPL: 20 % (ref 15–50)
IRON SERPL-MCNC: 60 UG/DL (ref 37–145)
KETONES UR STRIP-MCNC: NEGATIVE MG/DL
LEUKOCYTE ESTERASE UR QL STRIP: ABNORMAL
LYMPHOCYTES NFR BLD: 1.47 K/UL (ref 1.5–4)
LYMPHOCYTES RELATIVE PERCENT: 24 % (ref 20–42)
MAGNESIUM SERPL-MCNC: 2.5 MG/DL (ref 1.6–2.6)
MCH RBC QN AUTO: 29.2 PG (ref 26–35)
MCHC RBC AUTO-ENTMCNC: 31.5 G/DL (ref 32–34.5)
MCV RBC AUTO: 92.6 FL (ref 80–99.9)
MONOCYTES NFR BLD: 0.41 K/UL (ref 0.1–0.95)
MONOCYTES NFR BLD: 7 % (ref 2–12)
NEUTROPHILS NFR BLD: 62 % (ref 43–80)
NEUTS SEG NFR BLD: 3.8 K/UL (ref 1.8–7.3)
NITRITE UR QL STRIP: NEGATIVE
PH UR STRIP: 6 [PH] (ref 5–9)
PHOSPHATE SERPL-MCNC: 3.9 MG/DL (ref 2.5–4.5)
PLATELET # BLD AUTO: 296 K/UL (ref 130–450)
PMV BLD AUTO: 10.9 FL (ref 7–12)
POTASSIUM SERPL-SCNC: 4.2 MMOL/L (ref 3.5–5)
PROT SERPL-MCNC: 7.4 G/DL (ref 6.4–8.3)
PROT UR STRIP-MCNC: NEGATIVE MG/DL
PTH-INTACT SERPL-MCNC: 29.2 PG/ML (ref 15–65)
RBC # BLD AUTO: 4.21 M/UL (ref 3.5–5.5)
RBC #/AREA URNS HPF: ABNORMAL /HPF
SODIUM SERPL-SCNC: 136 MMOL/L (ref 132–146)
SP GR UR STRIP: <1.005 (ref 1–1.03)
TIBC SERPL-MCNC: 295 UG/DL (ref 250–450)
TOTAL PROTEIN, URINE: <4 MG/DL (ref 0–12)
URATE SERPL-MCNC: 4.9 MG/DL (ref 2.4–5.7)
URINE TOTAL PROTEIN CREATININE RATIO: ABNORMAL (ref 0–0.2)
UROBILINOGEN UR STRIP-ACNC: 0.2 EU/DL (ref 0–1)
WBC #/AREA URNS HPF: ABNORMAL /HPF
WBC OTHER # BLD: 6.1 K/UL (ref 4.5–11.5)

## 2024-04-17 PROCEDURE — 82306 VITAMIN D 25 HYDROXY: CPT

## 2024-04-17 PROCEDURE — 83970 ASSAY OF PARATHORMONE: CPT

## 2024-04-17 PROCEDURE — 84550 ASSAY OF BLOOD/URIC ACID: CPT

## 2024-04-17 PROCEDURE — 83880 ASSAY OF NATRIURETIC PEPTIDE: CPT

## 2024-04-17 PROCEDURE — 81001 URINALYSIS AUTO W/SCOPE: CPT

## 2024-04-17 PROCEDURE — 82570 ASSAY OF URINE CREATININE: CPT

## 2024-04-17 PROCEDURE — 85025 COMPLETE CBC W/AUTO DIFF WBC: CPT

## 2024-04-17 PROCEDURE — 80053 COMPREHEN METABOLIC PANEL: CPT

## 2024-04-17 PROCEDURE — 83540 ASSAY OF IRON: CPT

## 2024-04-17 PROCEDURE — 83550 IRON BINDING TEST: CPT

## 2024-04-17 PROCEDURE — 84156 ASSAY OF PROTEIN URINE: CPT

## 2024-04-17 PROCEDURE — 84100 ASSAY OF PHOSPHORUS: CPT

## 2024-04-17 PROCEDURE — 82728 ASSAY OF FERRITIN: CPT

## 2024-04-17 PROCEDURE — 99214 OFFICE O/P EST MOD 30 MIN: CPT

## 2024-04-17 PROCEDURE — 83735 ASSAY OF MAGNESIUM: CPT

## 2024-04-17 PROCEDURE — 36415 COLL VENOUS BLD VENIPUNCTURE: CPT

## 2024-04-17 NOTE — PLAN OF CARE
Problem: Chronic Conditions and Co-morbidities  Goal: Patient's chronic conditions and co-morbidity symptoms are monitored and maintained or improved  Flowsheets (Taken 4/17/2024 1211)  Care Plan - Patient's Chronic Conditions and Co-Morbidity Symptoms are Monitored and Maintained or Improved: Monitor and assess patient's chronic conditions and comorbid symptoms for stability, deterioration, or improvement

## 2024-04-17 NOTE — PROGRESS NOTES
Take 1 tablet by mouth in the morning and at bedtime, Disp: , Rfl:     alprazolam (XANAX) 1 MG tablet, Take 1 tablet by mouth 2 times daily., Disp: , Rfl:     furosemide (LASIX) 20 MG tablet, Take 1 tablet by mouth daily, Disp: , Rfl:     aspirin 81 MG EC tablet, Take 1 tablet by mouth daily, Disp: , Rfl:        GUIDELINE DIRECTED MEDICAL THERAPY for HFpEF:  SGLT2i:  (2a indication)  Farxiga 10 mg daily  Aldosterone antagonist: (2b indication)  No- 12/28/23- checking on consideration, 2/14/24- checking on consideration 4/17/24 no  ARNI/ACE I/ARB: (2b indication, with LVEF on lower end of spectrum)  No      RISK SCORES:    [] Palliative care consulted    Predictive Model Details          1%  Factor Value    End of Life Care Index Model 36% Age 69 y/o     18% Has Nonhypertensive Congestive Heart Failure Yes     16% Last Albumin Christine 4.6 g/dL     10% Has Medical Device Yes     10% Has Fluid or Electrolyte Disorder Yes     5% Prescribed Antidiabetic Yes     4% Prescribed Diuretic Yes     0% Last RDW Christine 13.3 %             HEART FAILURE FOCUSED PHYSICAL EXAMINATION:    Vitals:    04/17/24 1153   BP: (!) 124/58   Pulse: 70   Resp: 18   SpO2: 100%        Assessment  Charting Type: Shift assessment        HEENT (Head, Ears, Eyes, Nose, & Throat)  HEENT (WDL): Exceptions to WDL  Teeth: Dentures upper, Dentures lower  Respiratory  L Breath Sounds: Clear  R Breath Sounds: Clear        Cardiac  Cardiac Regularity: Regular  Heart Sounds: S1, S2  Cardiac Rhythm: Sinus rhythm  Rhythm Interpretation  Cardiac Rhythm: Sinus rhythm  Pulse: 70     Gastrointestinal  Abdominal (WDL): Within Defined Limits  RUQ Bowel Sounds: Active  LUQ Bowel Sounds: Active  RLQ Bowel Sounds: Active  LLQ Bowel Sounds: Active      Bowel Sounds  RUQ Bowel Sounds: Active  LUQ Bowel Sounds: Active  RLQ Bowel Sounds: Active  LLQ Bowel Sounds: Active  Peripheral Vascular  Peripheral Vascular (WDL): Within Defined Limits  RLE Edema: None  LLE Edema: None

## 2024-04-24 ENCOUNTER — OFFICE VISIT (OUTPATIENT)
Dept: SURGERY | Age: 69
End: 2024-04-24
Payer: MEDICARE

## 2024-04-24 VITALS
BODY MASS INDEX: 24.43 KG/M2 | DIASTOLIC BLOOD PRESSURE: 53 MMHG | OXYGEN SATURATION: 97 % | TEMPERATURE: 98.6 F | SYSTOLIC BLOOD PRESSURE: 126 MMHG | WEIGHT: 146.6 LBS | HEIGHT: 65 IN | HEART RATE: 68 BPM

## 2024-04-24 DIAGNOSIS — G89.29 ABDOMINAL PAIN, CHRONIC, RIGHT UPPER QUADRANT: Primary | ICD-10-CM

## 2024-04-24 DIAGNOSIS — R10.11 ABDOMINAL PAIN, CHRONIC, RIGHT UPPER QUADRANT: Primary | ICD-10-CM

## 2024-04-24 PROCEDURE — 1123F ACP DISCUSS/DSCN MKR DOCD: CPT | Performed by: SURGERY

## 2024-04-24 PROCEDURE — 99213 OFFICE O/P EST LOW 20 MIN: CPT | Performed by: SURGERY

## 2024-04-24 RX ORDER — APIXABAN 5 MG/1
5 TABLET, FILM COATED ORAL 2 TIMES DAILY
COMMUNITY
Start: 2024-03-28

## 2024-04-24 NOTE — PROGRESS NOTES
Patient's Name/Date of Birth: Sandhya Adame / 1955    Date: 4/24/2024    PCP: Edgardo Colon MD    Chief Complaint   Patient presents with    Follow-up    Abdominal Pain     Abdo pain wont go away- \"comes and goes \"       HPI:  Patient with severe recurrent RUQ abdominal pain. EGD and Colonoscopy were normal. Patient is s/p cholecystectomy many years ago    Patient's medications, allergies, past medical, surgical, social and family histories were reviewed and updated as appropriate.    Allergies   Allergen Reactions    Synvisc [Hylan G-F 20]      Local swelling       Past Medical History:   Diagnosis Date    Anxiety     Bilateral carotid artery stenosis 10/07/2021    Bruit of left carotid artery 07/09/2020    CHF (congestive heart failure) ([facility])     Decreased dorsalis pedis pulse 07/09/2020    History of deep vein thrombosis 05/18/2023    Right peroneal vein, January 2023    Humeral fracture     right    Hx of blood clots     Hypertension     Kidney disease     Left carotid stenosis 07/09/2020    Leg pain     Osteoarthritis of right knee 05/04/2011    Superficial phlebitis of leg, right 08/12/2021    Type II or unspecified type diabetes mellitus without mention of complication, not stated as uncontrolled     Varicose veins of leg with pain, right 08/12/2021        Past Surgical History:   Procedure Laterality Date    APPENDECTOMY      CHOLECYSTECTOMY      COLONOSCOPY N/A 2/19/2024    COLORECTAL CANCER SCREENING, NOT HIGH RISK performed by Kary Hicks MD at [facility] ENDOSCOPY    FRACTURE SURGERY Right     arm    KNEE ARTHROPLASTY Right 2011    Right TKA.  Karan Olmos MD    UPPER GASTROINTESTINAL ENDOSCOPY N/A 2/19/2024    EGD BIOPSY performed by Kary Hicks MD at [facility] ENDOSCOPY        Social History     Tobacco Use    Smoking status: Never    Smokeless tobacco: Never   Substance Use Topics    Alcohol use: Yes     Comment: occasionally       Current Outpatient Medications   Medication Sig

## 2024-05-14 DIAGNOSIS — Z79.4 TYPE 2 DIABETES MELLITUS WITH HYPERGLYCEMIA, WITH LONG-TERM CURRENT USE OF INSULIN (HCC): ICD-10-CM

## 2024-05-14 DIAGNOSIS — E11.65 TYPE 2 DIABETES MELLITUS WITH HYPERGLYCEMIA, WITH LONG-TERM CURRENT USE OF INSULIN (HCC): ICD-10-CM

## 2024-05-14 RX ORDER — INSULIN LISPRO 100 [IU]/ML
INJECTION, SOLUTION INTRAVENOUS; SUBCUTANEOUS
Qty: 90 ML | Refills: 3 | Status: SHIPPED
Start: 2024-05-14 | End: 2024-05-15 | Stop reason: SDUPTHER

## 2024-05-15 DIAGNOSIS — I65.23 BILATERAL CAROTID ARTERY STENOSIS: Primary | ICD-10-CM

## 2024-05-15 DIAGNOSIS — E11.65 TYPE 2 DIABETES MELLITUS WITH HYPERGLYCEMIA, WITH LONG-TERM CURRENT USE OF INSULIN (HCC): ICD-10-CM

## 2024-05-15 DIAGNOSIS — Z79.4 TYPE 2 DIABETES MELLITUS WITH HYPERGLYCEMIA, WITH LONG-TERM CURRENT USE OF INSULIN (HCC): ICD-10-CM

## 2024-05-15 RX ORDER — INSULIN LISPRO 100 [IU]/ML
INJECTION, SOLUTION INTRAVENOUS; SUBCUTANEOUS
Qty: 120 ML | Refills: 3 | Status: SHIPPED | OUTPATIENT
Start: 2024-05-15

## 2024-05-20 DIAGNOSIS — Z01.812 PRE-PROCEDURE LAB EXAM: Primary | ICD-10-CM

## 2024-05-21 ENCOUNTER — HOSPITAL ENCOUNTER (OUTPATIENT)
Age: 69
Discharge: HOME | End: 2024-05-21
Payer: MEDICARE

## 2024-05-21 ENCOUNTER — HOSPITAL ENCOUNTER (OUTPATIENT)
Dept: MRI IMAGING | Age: 69
Discharge: HOME | End: 2024-05-23
Attending: SURGERY
Payer: MEDICARE

## 2024-05-21 DIAGNOSIS — Z01.812 PRE-PROCEDURE LAB EXAM: ICD-10-CM

## 2024-05-21 DIAGNOSIS — G89.29 ABDOMINAL PAIN, CHRONIC, RIGHT UPPER QUADRANT: ICD-10-CM

## 2024-05-21 DIAGNOSIS — R10.11 ABDOMINAL PAIN, CHRONIC, RIGHT UPPER QUADRANT: ICD-10-CM

## 2024-05-21 LAB
BUN SERPL-MCNC: 32 MG/DL (ref 6–23)
CREAT SERPL-MCNC: 1.3 MG/DL (ref 0.5–1)
GFR, ESTIMATED: 45 ML/MIN/1.73M2

## 2024-05-21 PROCEDURE — 84520 ASSAY OF UREA NITROGEN: CPT

## 2024-05-21 PROCEDURE — 6360000004 HC RX CONTRAST MEDICATION: Performed by: RADIOLOGY

## 2024-05-21 PROCEDURE — 36415 COLL VENOUS BLD VENIPUNCTURE: CPT

## 2024-05-21 PROCEDURE — 74183 MRI ABD W/O CNTR FLWD CNTR: CPT

## 2024-05-21 PROCEDURE — 82565 ASSAY OF CREATININE: CPT

## 2024-05-21 PROCEDURE — A9579 GAD-BASE MR CONTRAST NOS,1ML: HCPCS | Performed by: RADIOLOGY

## 2024-05-21 RX ADMIN — GADOTERIDOL 14 ML: 279.3 INJECTION, SOLUTION INTRAVENOUS at 18:23

## 2024-05-30 ENCOUNTER — HOSPITAL ENCOUNTER (OUTPATIENT)
Dept: CARDIOLOGY | Age: 69
Discharge: HOME | End: 2024-06-01
Payer: MEDICARE

## 2024-05-30 ENCOUNTER — OFFICE VISIT (OUTPATIENT)
Dept: VASCULAR SURGERY | Age: 69
End: 2024-05-30
Payer: MEDICARE

## 2024-05-30 ENCOUNTER — TELEPHONE (OUTPATIENT)
Dept: SURGERY | Age: 69
End: 2024-05-30

## 2024-05-30 VITALS — WEIGHT: 145 LBS | BODY MASS INDEX: 24.13 KG/M2

## 2024-05-30 DIAGNOSIS — I65.23 BILATERAL CAROTID ARTERY STENOSIS: ICD-10-CM

## 2024-05-30 DIAGNOSIS — R09.89 BRUIT OF LEFT CAROTID ARTERY: Primary | ICD-10-CM

## 2024-05-30 DIAGNOSIS — R10.9 SIDE PAIN: Primary | ICD-10-CM

## 2024-05-30 PROBLEM — R07.9 CHEST PAIN: Status: RESOLVED | Noted: 2023-06-27 | Resolved: 2024-05-30

## 2024-05-30 PROBLEM — R10.11 ABDOMINAL PAIN, RIGHT UPPER QUADRANT: Status: RESOLVED | Noted: 2024-02-12 | Resolved: 2024-05-30

## 2024-05-30 PROBLEM — I50.9 ACUTE CONGESTIVE HEART FAILURE (HCC): Status: RESOLVED | Noted: 2023-01-05 | Resolved: 2024-05-30

## 2024-05-30 LAB
VAS LEFT CCA DIST EDV: 14.4 CM/S
VAS LEFT CCA DIST PSV: 79.9 CM/S
VAS LEFT CCA PROX EDV: 15.6 CM/S
VAS LEFT CCA PROX PSV: 105.3 CM/S
VAS LEFT ECA EDV: 0 CM/S
VAS LEFT ECA PSV: 165.3 CM/S
VAS LEFT ICA DIST EDV: 21.7 CM/S
VAS LEFT ICA DIST PSV: 111.2 CM/S
VAS LEFT ICA MID EDV: 30.5 CM/S
VAS LEFT ICA MID PSV: 178.9 CM/S
VAS LEFT ICA PROX EDV: 45.8 CM/S
VAS LEFT ICA PROX PSV: 181.5 CM/S
VAS LEFT ICA/CCA PSV: 1.7 NO UNITS
VAS LEFT VERTEBRAL EDV: 13 CM/S
VAS LEFT VERTEBRAL PSV: 64.4 CM/S
VAS RIGHT CCA DIST EDV: 19.3 CM/S
VAS RIGHT CCA DIST PSV: 89.3 CM/S
VAS RIGHT CCA PROX EDV: 19.3 CM/S
VAS RIGHT CCA PROX PSV: 95.7 CM/S
VAS RIGHT ECA EDV: 7.2 CM/S
VAS RIGHT ECA PSV: 75.8 CM/S
VAS RIGHT ICA DIST EDV: 23.2 CM/S
VAS RIGHT ICA DIST PSV: 95.4 CM/S
VAS RIGHT ICA MID EDV: 25.9 CM/S
VAS RIGHT ICA MID PSV: 141.4 CM/S
VAS RIGHT ICA PROX EDV: 33 CM/S
VAS RIGHT ICA PROX PSV: 181.5 CM/S
VAS RIGHT ICA/CCA PSV: 1.9 NO UNITS
VAS RIGHT VERTEBRAL EDV: 13.2 CM/S
VAS RIGHT VERTEBRAL PSV: 59.3 CM/S

## 2024-05-30 PROCEDURE — 93880 EXTRACRANIAL BILAT STUDY: CPT

## 2024-05-30 PROCEDURE — 1123F ACP DISCUSS/DSCN MKR DOCD: CPT | Performed by: SURGERY

## 2024-05-30 PROCEDURE — 99213 OFFICE O/P EST LOW 20 MIN: CPT | Performed by: SURGERY

## 2024-05-30 NOTE — TELEPHONE ENCOUNTER
PER DR. BROWN PTS MRI IS GOOD - PT CALLED REGARDING SHARP PAIN IN HER SIDE - DR. BROWN IS REFERRING TO PAIN MANAGEMENT - LEFT MESSAGE REGARDING REFERRAL AND TO CALL OFFICE IF SHE HAS ANY QUESTIONS

## 2024-05-30 NOTE — PROGRESS NOTES
Chief Complaint:   Chief Complaint   Patient presents with    Circulatory Problem     Bilateral carotid artery stenosis         HPI: Patient came to the office, for the evaluation of carotid artery disease, overall doing well and staying active, no changes in her health system      Patient denies any focal lateralizing neurological symptoms like loss of speech, vision or loss of function of extremity    Patient can walk a few blocks , and denies any symptoms of rest pain    Allergies   Allergen Reactions    Synvisc [Hylan G-F 20]      Local swelling       Current Outpatient Medications   Medication Sig Dispense Refill    insulin lispro (HUMALOG,ADMELOG) 100 UNIT/ML SOLN injection vial 150 units daily via insulin pump 120 mL 3    Continuous Blood Gluc Sensor (DEXCOM G6 SENSOR) MISC Change sensor every 10 days 3 each 5    Continuous Blood Gluc Transmit (DEXCOM G6 TRANSMITTER) MISC Change every 90 days 1 each 1    Insulin Disposable Pump (OMNIPOD 5 G6 INTRO, GEN 5,) KIT For insulin pump therapy 1 kit 0    Insulin Disposable Pump (OMNIPOD 5 G6 POD, GEN 5,) MISC Change every 3 days 30 each 4    insulin detemir (LEVEMIR FLEXTOUCH) 100 UNIT/ML injection pen Inject 5 Units into the skin 2 times daily 9 mL 3    atorvastatin (LIPITOR) 20 MG tablet TAKE ONE TABLET BY MOUTH DAILY 30 tablet 11    insulin lispro, 1 Unit Dial, (HUMALOG KWIKPEN) 100 UNIT/ML SOPN Inject 4 units plus low SS before meals BS  150-200 = 1 units, 201-250 = 2 units, 251-300 = 3 units, 301-350 = 4 units, 351-400 = 5 units, 401 = 6 units 4 Adjustable Dose Pre-filled Pen Syringe 5    dapagliflozin (FARXIGA) 10 MG tablet Take 1 tablet by mouth every morning      hydrALAZINE (APRESOLINE) 25 MG tablet Take 1 tablet by mouth every 8 hours Usually taken 2 x day (Patient taking differently: Take 1 tablet by mouth in the morning and 1 tablet in the evening. Usually taken 2 x day.) 90 tablet 1    verapamil (CALAN SR) 180 MG extended release tablet Take 1.5 tablets

## 2024-05-31 ENCOUNTER — TELEPHONE (OUTPATIENT)
Dept: SURGERY | Age: 69
End: 2024-05-31

## 2024-05-31 NOTE — TELEPHONE ENCOUNTER
Pt left a message - I returned her call and left another detailed message   Abdomen soft, non-tender and non-distended, no rebound, no guarding and no masses. no hepatosplenomegaly.

## 2024-06-12 ENCOUNTER — APPOINTMENT (OUTPATIENT)
Dept: GENERAL RADIOLOGY | Age: 69
DRG: 481 | End: 2024-06-12
Payer: MEDICARE

## 2024-06-12 ENCOUNTER — HOSPITAL ENCOUNTER (INPATIENT)
Age: 69
LOS: 5 days | Discharge: NURSING, REHAB OR LONG-TERM CARE | DRG: 481 | End: 2024-06-17
Attending: EMERGENCY MEDICINE | Admitting: SURGERY
Payer: MEDICARE

## 2024-06-12 ENCOUNTER — APPOINTMENT (OUTPATIENT)
Dept: CT IMAGING | Age: 69
DRG: 481 | End: 2024-06-12
Payer: MEDICARE

## 2024-06-12 DIAGNOSIS — M97.8XXA PERI-PROSTHETIC FEMUR FRACTURE AT TIP OF PROSTHESIS, INITIAL ENCOUNTER: ICD-10-CM
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
## 2024-06-12 DIAGNOSIS — Z96.649 PERI-PROSTHETIC FEMUR FRACTURE AT TIP OF PROSTHESIS, INITIAL ENCOUNTER: ICD-10-CM
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
## 2024-06-12 LAB
ALBUMIN SERPL-MCNC: 4.1 G/DL (ref 3.5–5.2)
ALP SERPL-CCNC: 83 U/L (ref 35–104)
ALT SERPL-CCNC: 20 U/L (ref 0–32)
ANION GAP SERPL CALCULATED.3IONS-SCNC: 16 MMOL/L (ref 7–16)
APAP SERPL-MCNC: <5 UG/ML (ref 10–30)
AST SERPL-CCNC: 26 U/L (ref 0–31)
B.E.: -1.7 MMOL/L (ref -3–3)
BASOPHILS # BLD: 0.07 K/UL (ref 0–0.2)
BASOPHILS NFR BLD: 1 % (ref 0–2)
BILIRUB SERPL-MCNC: 0.7 MG/DL (ref 0–1.2)
BUN SERPL-MCNC: 25 MG/DL (ref 6–23)
CALCIUM SERPL-MCNC: 9 MG/DL (ref 8.6–10.2)
CHLORIDE SERPL-SCNC: 99 MMOL/L (ref 98–107)
CLOT ANGLE.KAOLIN INDUCED BLD RES TEG: 75.5 DEG (ref 53–70)
CO2 SERPL-SCNC: 20 MMOL/L (ref 22–29)
COHB: 0.3 % (ref 0–1.5)
CREAT SERPL-MCNC: 1.3 MG/DL (ref 0.5–1)
CRITICAL: ABNORMAL
DATE ANALYZED: ABNORMAL
DATE OF COLLECTION: ABNORMAL
EOSINOPHIL # BLD: 0.31 K/UL (ref 0.05–0.5)
EOSINOPHILS RELATIVE PERCENT: 3 % (ref 0–6)
EPL-TEG: 0 % (ref 0–15)
ERYTHROCYTE [DISTWIDTH] IN BLOOD BY AUTOMATED COUNT: 13.6 % (ref 11.5–15)
ETHANOLAMINE SERPL-MCNC: <10 MG/DL (ref 0–0.08)
G-TEG: 16.1 KDYN/CM2 (ref 4.5–11)
GFR, ESTIMATED: 44 ML/MIN/1.73M2
GLUCOSE SERPL-MCNC: 220 MG/DL (ref 74–99)
HCO3: 21.2 MMOL/L (ref 22–26)
HCT VFR BLD AUTO: 36.3 % (ref 34–48)
HGB BLD-MCNC: 11.6 G/DL (ref 11.5–15.5)
HHB: 0.8 % (ref 0–5)
IMM GRANULOCYTES # BLD AUTO: 0.06 K/UL (ref 0–0.58)
IMM GRANULOCYTES NFR BLD: 1 % (ref 0–5)
INR PPP: 1
KINETICS TEG: 0.9 MIN (ref 1–3)
LAB: ABNORMAL
LACTATE BLDV-SCNC: 1.9 MMOL/L (ref 0.5–2.2)
LY30 (LYSIS) TEG: 0 % (ref 0–8)
LYMPHOCYTES NFR BLD: 1.6 K/UL (ref 1.5–4)
LYMPHOCYTES RELATIVE PERCENT: 15 % (ref 20–42)
Lab: 1906
MA (MAX CLOT) TEG: 76.3 MM (ref 50–70)
MCH RBC QN AUTO: 29.9 PG (ref 26–35)
MCHC RBC AUTO-ENTMCNC: 32 G/DL (ref 32–34.5)
MCV RBC AUTO: 93.6 FL (ref 80–99.9)
METHB: 0.2 % (ref 0–1.5)
MODE: ABNORMAL
MONOCYTES NFR BLD: 0.66 K/UL (ref 0.1–0.95)
MONOCYTES NFR BLD: 6 % (ref 2–12)
NEUTROPHILS NFR BLD: 74 % (ref 43–80)
NEUTS SEG NFR BLD: 7.71 K/UL (ref 1.8–7.3)
O2 SATURATION: 99.2 % (ref 92–98.5)
O2HB: 98.7 % (ref 94–97)
OPERATOR ID: 5100
PARTIAL THROMBOPLASTIN TIME: 30.1 SEC (ref 24.5–35.1)
PATIENT TEMP: 37 C
PCO2: 30 MMHG (ref 35–45)
PH BLOOD GAS: 7.47 (ref 7.35–7.45)
PLATELET # BLD AUTO: 308 K/UL (ref 130–450)
PMV BLD AUTO: 9.9 FL (ref 7–12)
PO2: 240.6 MMHG (ref 75–100)
POTASSIUM SERPL-SCNC: 4.05 MMOL/L (ref 3.5–5)
POTASSIUM SERPL-SCNC: 4.1 MMOL/L (ref 3.5–5)
PROT SERPL-MCNC: 6.8 G/DL (ref 6.4–8.3)
PROTHROMBIN TIME: 11.2 SEC (ref 9.3–12.4)
RBC # BLD AUTO: 3.88 M/UL (ref 3.5–5.5)
REACTION TIME TEG: 4.6 MIN (ref 5–10)
SALICYLATES SERPL-MCNC: <0.3 MG/DL (ref 0–30)
SODIUM SERPL-SCNC: 135 MMOL/L (ref 132–146)
SOURCE, BLOOD GAS: ABNORMAL
THB: 11.3 G/DL (ref 11.5–16.5)
TIME ANALYZED: 1908
TOXIC TRICYCLIC SC,BLOOD: NEGATIVE
WBC OTHER # BLD: 10.4 K/UL (ref 4.5–11.5)

## 2024-06-12 PROCEDURE — 73560 X-RAY EXAM OF KNEE 1 OR 2: CPT

## 2024-06-12 PROCEDURE — 86900 BLOOD TYPING SEROLOGIC ABO: CPT

## 2024-06-12 PROCEDURE — 82805 BLOOD GASES W/O2 SATURATION: CPT

## 2024-06-12 PROCEDURE — 74177 CT ABD & PELVIS W/CONTRAST: CPT

## 2024-06-12 PROCEDURE — 6810039001 HC L1 TRAUMA PRIORITY

## 2024-06-12 PROCEDURE — 80143 DRUG ASSAY ACETAMINOPHEN: CPT

## 2024-06-12 PROCEDURE — 70450 CT HEAD/BRAIN W/O DYE: CPT

## 2024-06-12 PROCEDURE — 73551 X-RAY EXAM OF FEMUR 1: CPT

## 2024-06-12 PROCEDURE — 85384 FIBRINOGEN ACTIVITY: CPT

## 2024-06-12 PROCEDURE — 72170 X-RAY EXAM OF PELVIS: CPT

## 2024-06-12 PROCEDURE — 80179 DRUG ASSAY SALICYLATE: CPT

## 2024-06-12 PROCEDURE — 85610 PROTHROMBIN TIME: CPT

## 2024-06-12 PROCEDURE — 86850 RBC ANTIBODY SCREEN: CPT

## 2024-06-12 PROCEDURE — 84132 ASSAY OF SERUM POTASSIUM: CPT

## 2024-06-12 PROCEDURE — 73090 X-RAY EXAM OF FOREARM: CPT

## 2024-06-12 PROCEDURE — 85576 BLOOD PLATELET AGGREGATION: CPT

## 2024-06-12 PROCEDURE — 80307 DRUG TEST PRSMV CHEM ANLYZR: CPT

## 2024-06-12 PROCEDURE — 72125 CT NECK SPINE W/O DYE: CPT

## 2024-06-12 PROCEDURE — 96374 THER/PROPH/DIAG INJ IV PUSH: CPT

## 2024-06-12 PROCEDURE — 6360000002 HC RX W HCPCS

## 2024-06-12 PROCEDURE — 83605 ASSAY OF LACTIC ACID: CPT

## 2024-06-12 PROCEDURE — 85025 COMPLETE CBC W/AUTO DIFF WBC: CPT

## 2024-06-12 PROCEDURE — 85730 THROMBOPLASTIN TIME PARTIAL: CPT

## 2024-06-12 PROCEDURE — 99285 EMERGENCY DEPT VISIT HI MDM: CPT

## 2024-06-12 PROCEDURE — 71045 X-RAY EXAM CHEST 1 VIEW: CPT

## 2024-06-12 PROCEDURE — 86901 BLOOD TYPING SEROLOGIC RH(D): CPT

## 2024-06-12 PROCEDURE — 71260 CT THORAX DX C+: CPT

## 2024-06-12 PROCEDURE — 85390 FIBRINOLYSINS SCREEN I&R: CPT

## 2024-06-12 PROCEDURE — 73552 X-RAY EXAM OF FEMUR 2/>: CPT

## 2024-06-12 PROCEDURE — 6360000004 HC RX CONTRAST MEDICATION: Performed by: RADIOLOGY

## 2024-06-12 PROCEDURE — 1200000000 HC SEMI PRIVATE

## 2024-06-12 PROCEDURE — G0480 DRUG TEST DEF 1-7 CLASSES: HCPCS

## 2024-06-12 PROCEDURE — 80053 COMPREHEN METABOLIC PANEL: CPT

## 2024-06-12 PROCEDURE — 85347 COAGULATION TIME ACTIVATED: CPT

## 2024-06-12 PROCEDURE — 6360000002 HC RX W HCPCS: Performed by: STUDENT IN AN ORGANIZED HEALTH CARE EDUCATION/TRAINING PROGRAM

## 2024-06-12 RX ORDER — ONDANSETRON 2 MG/ML
4 INJECTION INTRAMUSCULAR; INTRAVENOUS EVERY 6 HOURS PRN
Status: DISCONTINUED | OUTPATIENT
Start: 2024-06-12 | End: 2024-06-17 | Stop reason: HOSPADM

## 2024-06-12 RX ORDER — FENTANYL CITRATE 50 UG/ML
INJECTION, SOLUTION INTRAMUSCULAR; INTRAVENOUS DAILY PRN
Status: COMPLETED | OUTPATIENT
Start: 2024-06-12 | End: 2024-06-12

## 2024-06-12 RX ORDER — SODIUM CHLORIDE 9 MG/ML
INJECTION, SOLUTION INTRAVENOUS PRN
Status: DISCONTINUED | OUTPATIENT
Start: 2024-06-12 | End: 2024-06-17 | Stop reason: HOSPADM

## 2024-06-12 RX ORDER — METHOCARBAMOL 500 MG/1
1000 TABLET, FILM COATED ORAL 4 TIMES DAILY
Status: DISCONTINUED | OUTPATIENT
Start: 2024-06-12 | End: 2024-06-17 | Stop reason: HOSPADM

## 2024-06-12 RX ORDER — SODIUM CHLORIDE, SODIUM LACTATE, POTASSIUM CHLORIDE, CALCIUM CHLORIDE 600; 310; 30; 20 MG/100ML; MG/100ML; MG/100ML; MG/100ML
INJECTION, SOLUTION INTRAVENOUS CONTINUOUS
Status: DISCONTINUED | OUTPATIENT
Start: 2024-06-12 | End: 2024-06-12

## 2024-06-12 RX ORDER — SODIUM CHLORIDE 0.9 % (FLUSH) 0.9 %
10 SYRINGE (ML) INJECTION PRN
Status: DISCONTINUED | OUTPATIENT
Start: 2024-06-12 | End: 2024-06-17 | Stop reason: HOSPADM

## 2024-06-12 RX ORDER — POLYETHYLENE GLYCOL 3350 17 G/17G
17 POWDER, FOR SOLUTION ORAL DAILY
Status: DISCONTINUED | OUTPATIENT
Start: 2024-06-12 | End: 2024-06-17 | Stop reason: HOSPADM

## 2024-06-12 RX ORDER — OXYCODONE HYDROCHLORIDE 10 MG/1
10 TABLET ORAL EVERY 4 HOURS PRN
Status: DISCONTINUED | OUTPATIENT
Start: 2024-06-12 | End: 2024-06-17

## 2024-06-12 RX ORDER — TRANEXAMIC ACID 10 MG/ML
1000 INJECTION, SOLUTION INTRAVENOUS
Status: DISPENSED | OUTPATIENT
Start: 2024-06-13 | End: 2024-06-13

## 2024-06-12 RX ORDER — ACETAMINOPHEN 325 MG/1
650 TABLET ORAL EVERY 6 HOURS
Status: DISCONTINUED | OUTPATIENT
Start: 2024-06-12 | End: 2024-06-17 | Stop reason: HOSPADM

## 2024-06-12 RX ORDER — ONDANSETRON 4 MG/1
4 TABLET, ORALLY DISINTEGRATING ORAL EVERY 8 HOURS PRN
Status: DISCONTINUED | OUTPATIENT
Start: 2024-06-12 | End: 2024-06-17 | Stop reason: HOSPADM

## 2024-06-12 RX ORDER — OXYCODONE HYDROCHLORIDE 5 MG/1
5 TABLET ORAL EVERY 4 HOURS PRN
Status: DISCONTINUED | OUTPATIENT
Start: 2024-06-12 | End: 2024-06-17

## 2024-06-12 RX ORDER — SODIUM CHLORIDE 0.9 % (FLUSH) 0.9 %
10 SYRINGE (ML) INJECTION EVERY 12 HOURS SCHEDULED
Status: DISCONTINUED | OUTPATIENT
Start: 2024-06-12 | End: 2024-06-17 | Stop reason: HOSPADM

## 2024-06-12 RX ADMIN — ONDANSETRON 4 MG: 2 INJECTION INTRAMUSCULAR; INTRAVENOUS at 19:41

## 2024-06-12 RX ADMIN — HYDROMORPHONE HYDROCHLORIDE 0.5 MG: 1 INJECTION, SOLUTION INTRAMUSCULAR; INTRAVENOUS; SUBCUTANEOUS at 22:24

## 2024-06-12 RX ADMIN — HYDROMORPHONE HYDROCHLORIDE 0.5 MG: 1 INJECTION, SOLUTION INTRAMUSCULAR; INTRAVENOUS; SUBCUTANEOUS at 19:42

## 2024-06-12 RX ADMIN — FENTANYL CITRATE 100 MCG: 50 INJECTION, SOLUTION INTRAMUSCULAR; INTRAVENOUS at 19:04

## 2024-06-12 RX ADMIN — IOPAMIDOL 75 ML: 755 INJECTION, SOLUTION INTRAVENOUS at 19:30

## 2024-06-12 ASSESSMENT — PAIN DESCRIPTION - LOCATION
LOCATION: LEG
LOCATION: LEG

## 2024-06-12 ASSESSMENT — PAIN DESCRIPTION - ORIENTATION: ORIENTATION: RIGHT

## 2024-06-12 ASSESSMENT — PAIN SCALES - GENERAL
PAINLEVEL_OUTOF10: 10
PAINLEVEL_OUTOF10: 10

## 2024-06-12 ASSESSMENT — PAIN DESCRIPTION - DESCRIPTORS: DESCRIPTORS: SHARP

## 2024-06-12 NOTE — H&P
TRAUMA HISTORY & PHYSICAL  Attending/Surgical Resident/Advance Practice Nurse  6/12/2024  7:08 PM    PRIMARY SURVEY    CHIEF COMPLAINT:  Trauma team.    Injury occurred just prior to arrival. Pt is a older female that presented after a mechanical fall down 15 steps. Found to have RLE deformity. Did hit her head, no LOC. Reports R leg pain.    AIRWAY:   Airway Normal    EMS ETT Absent  Noisy respirations Absent  Retractions: Absent  Vomiting/bleeding: Absent    BREATHING:    Midaxillary breath sound left:  Normal  Midaxillary breath sound right:  Normal    Cough sound intensity:  good    FiO2:  15 L non-re breather mask    SMI Unreliable     CIRCULATION:   Femerol pulse intensity: Strong  Palpebral conjunctiva: Pink     Vitals:    06/12/24 1907   BP:    Pulse:    Resp:    Temp: 96.9 °F (36.1 °C)   SpO2:        Vitals:    06/12/24 1905 06/12/24 1905 06/12/24 1906 06/12/24 1907   BP:       Pulse:  83     Resp:   21    Temp:    96.9 °F (36.1 °C)   SpO2:  100%     Weight: 65.8 kg (145 lb)           FAST EXAM: Deferred    Central Nervous System    GCS Initial 15 minutes   Eye  Motor  Verbal 4 - Opens eyes on own  6 - Follows simple motor commands  5 - Alert and oriented 4 - Opens eyes on own  6 - Follows simple motor commands  5 - Alert and oriented     Neuromuscular blockade: No  Pupil size:  Left 4 mm    Right 4 mm  Pupil reaction: Yes    Wiggles fingers: Left Yes Right Yes  Wiggles toes: Left Yes   Right Yes    Hand grasp:   Left  Present      Right  Present  Plantar flexion: Left  Present      Right   Present    Loss of consciousness:  No     History Obtained From:  Patient & EMS  Private Medical Doctor: No primary care provider on file.      Pre-exisiting Medical History:  yes    Conditions: Doesn't know    Medications: Doesn't know    Allergies: NKDA    Social History:   Tobacco use:  none  Alcohol use:  none  Illicit drug use:  no history of illicit drug use    Past Surgical History:  R knee    Anticoagulant use:

## 2024-06-12 NOTE — ED PROVIDER NOTES
noted to have mild increase in her BUN and creatinine no evidence of a significant metabolic acidosis and a normal CBC.  I did personally review the plain films.    Re-Evaluations:             Re-evaluation.  Patient’s symptoms are improving      Consultations:             Trauma and orthopedic surgery    Critical Care: 0        This patient's ED course included: IV medications, cardiac monitoring, continuous pulse oximetry, complex medical decision making and emergency management, and a personal history and physicial eaxmination    This patient has remained hemodynamically stable and improved during their ED course.    Counseling:   The emergency provider has spoken with the patient and discussed today’s results, in addition to providing specific details for the plan of care and counseling regarding the diagnosis and prognosis.  Questions are answered at this time and they are agreeable with the plan.       --------------------------------- IMPRESSION AND DISPOSITION ---------------------------------    IMPRESSION  1. Fall, initial encounter    2. Griselda-prosthetic femur fracture at tip of prosthesis, initial encounter        DISPOSITION  Disposition: as per consultation   Patient condition is serious         Otis Daugherty MD  06/12/24 2004

## 2024-06-12 NOTE — DISCHARGE INSTRUCTIONS
providers will de-escalate (wean) all patients from narcotic (opioid) medications during the post-operative period.   We provide multimodal pain control, but opioid medications are tapered in all of our patients.  If patient requires referral to pain management for prolonged taper from opioid pain medication, we will facilitate this process.        Weight bearing is an important component to your healing fracture or injury. If you put weight on your extremity too soon, the fixation (plates/screws) could loosen and cause your fracture to shift or move.   It is crucial you listen to your surgeon regarding weight bearing recommendations.    After surgery your weight bearing status is determined by your surgeon. For a significant portion of lower extremity and upper extremity fractures, this will be a non-weightbearing or touch-down weight bearing status. Usually this is continued for 6-10 weeks before you are allowed to start weightbearing.    Weightbearing as tolerated (WBAT)  This means that you can put as much weight as you can tolerated through your arm or leg. The key is \"AS TOLERATED\". You should not push through the pain and use your pain as a guide. Often times you will have a brace to provide increased stability.    Partial Weightbearing (PWB)  This status allows you to put some weight through your leg. Usually it is a percentage of full body weight. It is important you do not increase the amount of weight above what you are told to do.    Touchdown Weightbearing (TDWB)  This allows you to use your leg for balance, but does not allow you to put weight on your leg. It is often used with fractures higher up in the leg (hip or pelvis) to decrease the strain on the muscles around the hip.    Non-weightbearing (NWB)  You are not allowed to put any weight on your leg or arm. This is usually used when the fixation (i.e. plates and screws) cannot withstand repetitive stress of walking or lifting. The bone must be

## 2024-06-12 NOTE — ED NOTES
Patient log rolled , spinal precautions maintained   No spinal tenderness step offs / deformities

## 2024-06-13 ENCOUNTER — APPOINTMENT (OUTPATIENT)
Dept: GENERAL RADIOLOGY | Age: 69
DRG: 481 | End: 2024-06-13
Payer: MEDICARE

## 2024-06-13 ENCOUNTER — ANESTHESIA (OUTPATIENT)
Dept: OPERATING ROOM | Age: 69
End: 2024-06-13
Payer: MEDICARE

## 2024-06-13 ENCOUNTER — APPOINTMENT (OUTPATIENT)
Dept: CT IMAGING | Age: 69
DRG: 481 | End: 2024-06-13
Payer: MEDICARE

## 2024-06-13 ENCOUNTER — ANESTHESIA EVENT (OUTPATIENT)
Dept: OPERATING ROOM | Age: 69
End: 2024-06-13
Payer: MEDICARE
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
## 2024-06-13 LAB
ABO + RH BLD: NORMAL
AMPHET UR QL SCN: NEGATIVE
ANION GAP SERPL CALCULATED.3IONS-SCNC: 16 MMOL/L (ref 7–16)
ANION GAP SERPL CALCULATED.3IONS-SCNC: 28 MMOL/L (ref 7–16)
ARM BAND NUMBER: NORMAL
BARBITURATES UR QL SCN: NEGATIVE
BASOPHILS # BLD: 0.04 K/UL (ref 0–0.2)
BASOPHILS NFR BLD: 0 % (ref 0–2)
BENZODIAZ UR QL: POSITIVE
BLOOD BANK SAMPLE EXPIRATION: NORMAL
BLOOD GROUP ANTIBODIES SERPL: NEGATIVE
BUN SERPL-MCNC: 38 MG/DL (ref 6–23)
BUN SERPL-MCNC: 46 MG/DL (ref 6–23)
BUPRENORPHINE UR QL: NEGATIVE
CALCIUM SERPL-MCNC: 8.2 MG/DL (ref 8.6–10.2)
CALCIUM SERPL-MCNC: 8.8 MG/DL (ref 8.6–10.2)
CANNABINOIDS UR QL SCN: NEGATIVE
CHLORIDE SERPL-SCNC: 103 MMOL/L (ref 98–107)
CHLORIDE SERPL-SCNC: 98 MMOL/L (ref 98–107)
CO2 SERPL-SCNC: 19 MMOL/L (ref 22–29)
CO2 SERPL-SCNC: 9 MMOL/L (ref 22–29)
COCAINE UR QL SCN: NEGATIVE
CREAT SERPL-MCNC: 1.7 MG/DL (ref 0.5–1)
CREAT SERPL-MCNC: 2.2 MG/DL (ref 0.5–1)
CREAT UR-MCNC: 140.1 MG/DL (ref 29–226)
EOSINOPHIL # BLD: 0.01 K/UL (ref 0.05–0.5)
EOSINOPHILS RELATIVE PERCENT: 0 % (ref 0–6)
ERYTHROCYTE [DISTWIDTH] IN BLOOD BY AUTOMATED COUNT: 13.7 % (ref 11.5–15)
FENTANYL UR QL: POSITIVE
GFR, ESTIMATED: 24 ML/MIN/1.73M2
GFR, ESTIMATED: 34 ML/MIN/1.73M2
GLUCOSE BLD-MCNC: 237 MG/DL (ref 74–99)
GLUCOSE BLD-MCNC: 238 MG/DL (ref 74–99)
GLUCOSE BLD-MCNC: 239 MG/DL (ref 74–99)
GLUCOSE BLD-MCNC: 264 MG/DL (ref 74–99)
GLUCOSE BLD-MCNC: 278 MG/DL (ref 74–99)
GLUCOSE BLD-MCNC: 288 MG/DL (ref 74–99)
GLUCOSE BLD-MCNC: 307 MG/DL (ref 74–99)
GLUCOSE SERPL-MCNC: 181 MG/DL (ref 74–99)
GLUCOSE SERPL-MCNC: 278 MG/DL (ref 74–99)
HCT VFR BLD AUTO: 31.8 % (ref 34–48)
HGB BLD-MCNC: 9.4 G/DL (ref 11.5–15.5)
IMM GRANULOCYTES # BLD AUTO: 0.05 K/UL (ref 0–0.58)
IMM GRANULOCYTES NFR BLD: 0 % (ref 0–5)
LYMPHOCYTES NFR BLD: 0.65 K/UL (ref 1.5–4)
LYMPHOCYTES RELATIVE PERCENT: 4 % (ref 20–42)
MCH RBC QN AUTO: 29.3 PG (ref 26–35)
MCHC RBC AUTO-ENTMCNC: 29.6 G/DL (ref 32–34.5)
MCV RBC AUTO: 99.1 FL (ref 80–99.9)
METHADONE UR QL: NEGATIVE
MONOCYTES NFR BLD: 1.26 K/UL (ref 0.1–0.95)
MONOCYTES NFR BLD: 8 % (ref 2–12)
NEUTROPHILS NFR BLD: 87 % (ref 43–80)
NEUTS SEG NFR BLD: 13.79 K/UL (ref 1.8–7.3)
OPIATES UR QL SCN: NEGATIVE
OXYCODONE UR QL SCN: NEGATIVE
PCP UR QL SCN: NEGATIVE
PLATELET # BLD AUTO: 313 K/UL (ref 130–450)
PMV BLD AUTO: 10.4 FL (ref 7–12)
POTASSIUM SERPL-SCNC: 4.7 MMOL/L (ref 3.5–5)
POTASSIUM SERPL-SCNC: 5.6 MMOL/L (ref 3.5–5)
RBC # BLD AUTO: 3.21 M/UL (ref 3.5–5.5)
SODIUM SERPL-SCNC: 135 MMOL/L (ref 132–146)
SODIUM SERPL-SCNC: 138 MMOL/L (ref 132–146)
SODIUM UR-SCNC: <20 MMOL/L
TEST INFORMATION: ABNORMAL
UUN UR-MCNC: 268 MG/DL (ref 800–1666)
WBC OTHER # BLD: 15.8 K/UL (ref 4.5–11.5)

## 2024-06-13 PROCEDURE — 2580000003 HC RX 258: Performed by: STUDENT IN AN ORGANIZED HEALTH CARE EDUCATION/TRAINING PROGRAM

## 2024-06-13 PROCEDURE — 86901 BLOOD TYPING SEROLOGIC RH(D): CPT

## 2024-06-13 PROCEDURE — 3700000000 HC ANESTHESIA ATTENDED CARE: Performed by: ORTHOPAEDIC SURGERY

## 2024-06-13 PROCEDURE — 6370000000 HC RX 637 (ALT 250 FOR IP): Performed by: STUDENT IN AN ORGANIZED HEALTH CARE EDUCATION/TRAINING PROGRAM

## 2024-06-13 PROCEDURE — 3600000015 HC SURGERY LEVEL 5 ADDTL 15MIN: Performed by: ORTHOPAEDIC SURGERY

## 2024-06-13 PROCEDURE — 2580000003 HC RX 258: Performed by: NURSE PRACTITIONER

## 2024-06-13 PROCEDURE — 2700000000 HC OXYGEN THERAPY PER DAY

## 2024-06-13 PROCEDURE — 2500000003 HC RX 250 WO HCPCS: Performed by: ANESTHESIOLOGY

## 2024-06-13 PROCEDURE — 3700000001 HC ADD 15 MINUTES (ANESTHESIA): Performed by: ORTHOPAEDIC SURGERY

## 2024-06-13 PROCEDURE — 85025 COMPLETE CBC W/AUTO DIFF WBC: CPT

## 2024-06-13 PROCEDURE — 73552 X-RAY EXAM OF FEMUR 2/>: CPT

## 2024-06-13 PROCEDURE — 6370000000 HC RX 637 (ALT 250 FOR IP)

## 2024-06-13 PROCEDURE — 82570 ASSAY OF URINE CREATININE: CPT

## 2024-06-13 PROCEDURE — C1713 ANCHOR/SCREW BN/BN,TIS/BN: HCPCS | Performed by: ORTHOPAEDIC SURGERY

## 2024-06-13 PROCEDURE — 80048 BASIC METABOLIC PNL TOTAL CA: CPT

## 2024-06-13 PROCEDURE — 6360000002 HC RX W HCPCS: Performed by: NURSE ANESTHETIST, CERTIFIED REGISTERED

## 2024-06-13 PROCEDURE — 3600000005 HC SURGERY LEVEL 5 BASE: Performed by: ORTHOPAEDIC SURGERY

## 2024-06-13 PROCEDURE — 2500000003 HC RX 250 WO HCPCS: Performed by: NURSE ANESTHETIST, CERTIFIED REGISTERED

## 2024-06-13 PROCEDURE — 82962 GLUCOSE BLOOD TEST: CPT

## 2024-06-13 PROCEDURE — 84300 ASSAY OF URINE SODIUM: CPT

## 2024-06-13 PROCEDURE — 86923 COMPATIBILITY TEST ELECTRIC: CPT

## 2024-06-13 PROCEDURE — C1889 IMPLANT/INSERT DEVICE, NOC: HCPCS | Performed by: ORTHOPAEDIC SURGERY

## 2024-06-13 PROCEDURE — 0QSB06Z REPOSITION RIGHT LOWER FEMUR WITH INTRAMEDULLARY INTERNAL FIXATION DEVICE, OPEN APPROACH: ICD-10-PCS | Performed by: ORTHOPAEDIC SURGERY

## 2024-06-13 PROCEDURE — 36415 COLL VENOUS BLD VENIPUNCTURE: CPT

## 2024-06-13 PROCEDURE — 99232 SBSQ HOSP IP/OBS MODERATE 35: CPT | Performed by: SURGERY

## 2024-06-13 PROCEDURE — 76000 FLUOROSCOPY <1 HR PHYS/QHP: CPT

## 2024-06-13 PROCEDURE — 7100000000 HC PACU RECOVERY - FIRST 15 MIN: Performed by: ORTHOPAEDIC SURGERY

## 2024-06-13 PROCEDURE — 2720000010 HC SURG SUPPLY STERILE: Performed by: ORTHOPAEDIC SURGERY

## 2024-06-13 PROCEDURE — 80307 DRUG TEST PRSMV CHEM ANLYZR: CPT

## 2024-06-13 PROCEDURE — 6370000000 HC RX 637 (ALT 250 FOR IP): Performed by: SPECIALIST/TECHNOLOGIST

## 2024-06-13 PROCEDURE — 2709999900 HC NON-CHARGEABLE SUPPLY: Performed by: ORTHOPAEDIC SURGERY

## 2024-06-13 PROCEDURE — 6360000002 HC RX W HCPCS: Performed by: STUDENT IN AN ORGANIZED HEALTH CARE EDUCATION/TRAINING PROGRAM

## 2024-06-13 PROCEDURE — 73700 CT LOWER EXTREMITY W/O DYE: CPT

## 2024-06-13 PROCEDURE — C1769 GUIDE WIRE: HCPCS | Performed by: ORTHOPAEDIC SURGERY

## 2024-06-13 PROCEDURE — 86900 BLOOD TYPING SEROLOGIC ABO: CPT

## 2024-06-13 PROCEDURE — 86850 RBC ANTIBODY SCREEN: CPT

## 2024-06-13 PROCEDURE — 6370000000 HC RX 637 (ALT 250 FOR IP): Performed by: NURSE PRACTITIONER

## 2024-06-13 PROCEDURE — 1200000000 HC SEMI PRIVATE

## 2024-06-13 PROCEDURE — 7100000001 HC PACU RECOVERY - ADDTL 15 MIN: Performed by: ORTHOPAEDIC SURGERY

## 2024-06-13 PROCEDURE — 84540 ASSAY OF URINE/UREA-N: CPT

## 2024-06-13 PROCEDURE — 2500000003 HC RX 250 WO HCPCS

## 2024-06-13 DEVICE — SCREW LK F/IM NAIL 5X32MM XL25 ST: Type: IMPLANTABLE DEVICE | Site: FEMUR | Status: FUNCTIONAL

## 2024-06-13 DEVICE — SCREW LK F/IM NAIL 5X56MM XL25 ST: Type: IMPLANTABLE DEVICE | Site: FEMUR | Status: FUNCTIONAL

## 2024-06-13 DEVICE — SCREW LOCKING FOR IM NAIL 5X66MM XL25 SILX: Type: IMPLANTABLE DEVICE | Site: FEMUR | Status: FUNCTIONAL

## 2024-06-13 DEVICE — SCREW LK F/IM NAIL 5X34MM XL25 ST: Type: IMPLANTABLE DEVICE | Site: FEMUR | Status: FUNCTIONAL

## 2024-06-13 DEVICE — CAP END F/RFNA 0MM STERILE: Type: IMPLANTABLE DEVICE | Site: FEMUR | Status: FUNCTIONAL

## 2024-06-13 DEVICE — SCREW LCK F/IM NAIL 5X76MM XL25: Type: IMPLANTABLE DEVICE | Site: FEMUR | Status: FUNCTIONAL

## 2024-06-13 DEVICE — IMPLANTABLE DEVICE: Type: IMPLANTABLE DEVICE | Site: FEMUR | Status: FUNCTIONAL

## 2024-06-13 DEVICE — SCREW L0CK F/IM NAIL 5X70MM XL25 STR: Type: IMPLANTABLE DEVICE | Site: FEMUR | Status: FUNCTIONAL

## 2024-06-13 RX ORDER — INSULIN LISPRO 100 [IU]/ML
0-16 INJECTION, SOLUTION INTRAVENOUS; SUBCUTANEOUS EVERY 4 HOURS
Status: DISCONTINUED | OUTPATIENT
Start: 2024-06-13 | End: 2024-06-17 | Stop reason: HOSPADM

## 2024-06-13 RX ORDER — ERGOCALCIFEROL 1.25 MG/1
50000 CAPSULE ORAL WEEKLY
Qty: 12 CAPSULE | Refills: 1 | Status: SHIPPED | OUTPATIENT
Start: 2024-06-13

## 2024-06-13 RX ORDER — ZINC SULFATE 50(220)MG
220 CAPSULE ORAL DAILY
Qty: 30 CAPSULE | Refills: 2 | Status: SHIPPED | OUTPATIENT
Start: 2024-06-13

## 2024-06-13 RX ORDER — ALPRAZOLAM 1 MG/1
1 TABLET ORAL NIGHTLY PRN
Status: DISCONTINUED | OUTPATIENT
Start: 2024-06-13 | End: 2024-06-17 | Stop reason: HOSPADM

## 2024-06-13 RX ORDER — INSULIN LISPRO 100 [IU]/ML
0-8 INJECTION, SOLUTION INTRAVENOUS; SUBCUTANEOUS EVERY 4 HOURS
Status: DISCONTINUED | OUTPATIENT
Start: 2024-06-13 | End: 2024-06-13

## 2024-06-13 RX ORDER — TRANEXAMIC ACID 10 MG/ML
1000 INJECTION, SOLUTION INTRAVENOUS
Status: DISPENSED | OUTPATIENT
Start: 2024-06-13 | End: 2024-06-14

## 2024-06-13 RX ORDER — SUCCINYLCHOLINE/SOD CL,ISO/PF 200MG/10ML
SYRINGE (ML) INTRAVENOUS PRN
Status: DISCONTINUED | OUTPATIENT
Start: 2024-06-13 | End: 2024-06-13 | Stop reason: SDUPTHER

## 2024-06-13 RX ORDER — ONDANSETRON 2 MG/ML
4 INJECTION INTRAMUSCULAR; INTRAVENOUS
Status: DISCONTINUED | OUTPATIENT
Start: 2024-06-13 | End: 2024-06-13 | Stop reason: HOSPADM

## 2024-06-13 RX ORDER — LANOLIN ALCOHOL/MO/W.PET/CERES
400 CREAM (GRAM) TOPICAL 2 TIMES DAILY
COMMUNITY

## 2024-06-13 RX ORDER — NALOXONE HYDROCHLORIDE 0.4 MG/ML
INJECTION, SOLUTION INTRAMUSCULAR; INTRAVENOUS; SUBCUTANEOUS PRN
Status: DISCONTINUED | OUTPATIENT
Start: 2024-06-13 | End: 2024-06-13 | Stop reason: HOSPADM

## 2024-06-13 RX ORDER — DEXTROSE MONOHYDRATE 100 MG/ML
INJECTION, SOLUTION INTRAVENOUS CONTINUOUS PRN
Status: DISCONTINUED | OUTPATIENT
Start: 2024-06-13 | End: 2024-06-17 | Stop reason: HOSPADM

## 2024-06-13 RX ORDER — DIPHENHYDRAMINE HYDROCHLORIDE 50 MG/ML
12.5 INJECTION INTRAMUSCULAR; INTRAVENOUS
Status: DISCONTINUED | OUTPATIENT
Start: 2024-06-13 | End: 2024-06-13 | Stop reason: HOSPADM

## 2024-06-13 RX ORDER — ERGOCALCIFEROL 1.25 MG/1
50000 CAPSULE ORAL WEEKLY
Status: DISCONTINUED | OUTPATIENT
Start: 2024-06-13 | End: 2024-06-13

## 2024-06-13 RX ORDER — ASPIRIN 325 MG
325 TABLET, DELAYED RELEASE (ENTERIC COATED) ORAL 2 TIMES DAILY
Qty: 56 TABLET | Refills: 0 | Status: SHIPPED | OUTPATIENT
Start: 2024-06-13 | End: 2024-07-11

## 2024-06-13 RX ORDER — FENTANYL CITRATE 50 UG/ML
INJECTION, SOLUTION INTRAMUSCULAR; INTRAVENOUS PRN
Status: DISCONTINUED | OUTPATIENT
Start: 2024-06-13 | End: 2024-06-13 | Stop reason: SDUPTHER

## 2024-06-13 RX ORDER — CEFAZOLIN SODIUM 1 G/3ML
INJECTION, POWDER, FOR SOLUTION INTRAMUSCULAR; INTRAVENOUS PRN
Status: DISCONTINUED | OUTPATIENT
Start: 2024-06-13 | End: 2024-06-13 | Stop reason: SDUPTHER

## 2024-06-13 RX ORDER — SODIUM CHLORIDE 0.9 % (FLUSH) 0.9 %
5-40 SYRINGE (ML) INJECTION PRN
Status: DISCONTINUED | OUTPATIENT
Start: 2024-06-13 | End: 2024-06-13 | Stop reason: HOSPADM

## 2024-06-13 RX ORDER — IPRATROPIUM BROMIDE AND ALBUTEROL SULFATE 2.5; .5 MG/3ML; MG/3ML
1 SOLUTION RESPIRATORY (INHALATION)
Status: DISCONTINUED | OUTPATIENT
Start: 2024-06-13 | End: 2024-06-13 | Stop reason: HOSPADM

## 2024-06-13 RX ORDER — ASPIRIN 325 MG
325 TABLET, DELAYED RELEASE (ENTERIC COATED) ORAL 2 TIMES DAILY
Status: DISCONTINUED | OUTPATIENT
Start: 2024-06-14 | End: 2024-06-17 | Stop reason: HOSPADM

## 2024-06-13 RX ORDER — HYDRALAZINE HYDROCHLORIDE 20 MG/ML
5 INJECTION INTRAMUSCULAR; INTRAVENOUS
Status: DISCONTINUED | OUTPATIENT
Start: 2024-06-13 | End: 2024-06-13 | Stop reason: HOSPADM

## 2024-06-13 RX ORDER — ASCORBIC ACID 500 MG
500 TABLET ORAL DAILY
Qty: 30 TABLET | Refills: 3 | Status: SHIPPED | OUTPATIENT
Start: 2024-06-13

## 2024-06-13 RX ORDER — M-VIT,TX,IRON,MINS/CALC/FOLIC 27MG-0.4MG
1 TABLET ORAL DAILY
COMMUNITY

## 2024-06-13 RX ORDER — OXYCODONE HYDROCHLORIDE AND ACETAMINOPHEN 5; 325 MG/1; MG/1
1 TABLET ORAL EVERY 6 HOURS PRN
Qty: 28 TABLET | Refills: 0 | Status: SHIPPED | OUTPATIENT
Start: 2024-06-13 | End: 2024-06-20

## 2024-06-13 RX ORDER — SODIUM CHLORIDE 9 MG/ML
INJECTION, SOLUTION INTRAVENOUS PRN
Status: DISCONTINUED | OUTPATIENT
Start: 2024-06-13 | End: 2024-06-13 | Stop reason: HOSPADM

## 2024-06-13 RX ORDER — DEXAMETHASONE SODIUM PHOSPHATE 10 MG/ML
INJECTION INTRAMUSCULAR; INTRAVENOUS PRN
Status: DISCONTINUED | OUTPATIENT
Start: 2024-06-13 | End: 2024-06-13 | Stop reason: SDUPTHER

## 2024-06-13 RX ORDER — DROPERIDOL 2.5 MG/ML
0.62 INJECTION, SOLUTION INTRAMUSCULAR; INTRAVENOUS
Status: DISCONTINUED | OUTPATIENT
Start: 2024-06-13 | End: 2024-06-13 | Stop reason: HOSPADM

## 2024-06-13 RX ORDER — ZINC SULFATE 50(220)MG
50 CAPSULE ORAL DAILY
Status: DISCONTINUED | OUTPATIENT
Start: 2024-06-13 | End: 2024-06-17 | Stop reason: HOSPADM

## 2024-06-13 RX ORDER — TRANEXAMIC ACID 10 MG/ML
INJECTION, SOLUTION INTRAVENOUS PRN
Status: DISCONTINUED | OUTPATIENT
Start: 2024-06-13 | End: 2024-06-13 | Stop reason: SDUPTHER

## 2024-06-13 RX ORDER — SODIUM CHLORIDE 9 MG/ML
INJECTION, SOLUTION INTRAVENOUS CONTINUOUS
Status: DISCONTINUED | OUTPATIENT
Start: 2024-06-13 | End: 2024-06-17

## 2024-06-13 RX ORDER — SODIUM CHLORIDE 9 MG/ML
INJECTION, SOLUTION INTRAVENOUS CONTINUOUS
Status: DISCONTINUED | OUTPATIENT
Start: 2024-06-13 | End: 2024-06-13 | Stop reason: SDUPTHER

## 2024-06-13 RX ORDER — PROPOFOL 10 MG/ML
INJECTION, EMULSION INTRAVENOUS PRN
Status: DISCONTINUED | OUTPATIENT
Start: 2024-06-13 | End: 2024-06-13 | Stop reason: SDUPTHER

## 2024-06-13 RX ORDER — ATORVASTATIN CALCIUM 20 MG/1
20 TABLET, FILM COATED ORAL NIGHTLY
Status: DISCONTINUED | OUTPATIENT
Start: 2024-06-13 | End: 2024-06-17 | Stop reason: HOSPADM

## 2024-06-13 RX ORDER — SODIUM CHLORIDE 0.9 % (FLUSH) 0.9 %
5-40 SYRINGE (ML) INJECTION EVERY 12 HOURS SCHEDULED
Status: DISCONTINUED | OUTPATIENT
Start: 2024-06-13 | End: 2024-06-13 | Stop reason: HOSPADM

## 2024-06-13 RX ORDER — ERGOCALCIFEROL 1.25 MG/1
50000 CAPSULE ORAL WEEKLY
Status: DISCONTINUED | OUTPATIENT
Start: 2024-06-14 | End: 2024-06-17 | Stop reason: HOSPADM

## 2024-06-13 RX ORDER — HYDROMORPHONE HYDROCHLORIDE 1 MG/ML
0.25 INJECTION, SOLUTION INTRAMUSCULAR; INTRAVENOUS; SUBCUTANEOUS EVERY 5 MIN PRN
Status: DISCONTINUED | OUTPATIENT
Start: 2024-06-13 | End: 2024-06-13 | Stop reason: HOSPADM

## 2024-06-13 RX ORDER — LABETALOL HYDROCHLORIDE 5 MG/ML
5 INJECTION, SOLUTION INTRAVENOUS
Status: DISCONTINUED | OUTPATIENT
Start: 2024-06-13 | End: 2024-06-13 | Stop reason: HOSPADM

## 2024-06-13 RX ORDER — ASCORBIC ACID 500 MG
500 TABLET ORAL DAILY
Status: DISCONTINUED | OUTPATIENT
Start: 2024-06-13 | End: 2024-06-17 | Stop reason: HOSPADM

## 2024-06-13 RX ORDER — GLUCAGON 1 MG/ML
1 KIT INJECTION PRN
Status: DISCONTINUED | OUTPATIENT
Start: 2024-06-13 | End: 2024-06-17 | Stop reason: HOSPADM

## 2024-06-13 RX ORDER — ACETAMINOPHEN 325 MG/1
650 TABLET ORAL
Status: DISCONTINUED | OUTPATIENT
Start: 2024-06-13 | End: 2024-06-13 | Stop reason: HOSPADM

## 2024-06-13 RX ORDER — GABAPENTIN 300 MG/1
300 CAPSULE ORAL 2 TIMES DAILY
Status: DISCONTINUED | OUTPATIENT
Start: 2024-06-13 | End: 2024-06-17

## 2024-06-13 RX ORDER — LIDOCAINE HYDROCHLORIDE 20 MG/ML
INJECTION, SOLUTION INTRAVENOUS PRN
Status: DISCONTINUED | OUTPATIENT
Start: 2024-06-13 | End: 2024-06-13 | Stop reason: SDUPTHER

## 2024-06-13 RX ORDER — SODIUM CHLORIDE, SODIUM LACTATE, POTASSIUM CHLORIDE, AND CALCIUM CHLORIDE .6; .31; .03; .02 G/100ML; G/100ML; G/100ML; G/100ML
1000 INJECTION, SOLUTION INTRAVENOUS ONCE
Status: COMPLETED | OUTPATIENT
Start: 2024-06-13 | End: 2024-06-13

## 2024-06-13 RX ORDER — ONDANSETRON 2 MG/ML
INJECTION INTRAMUSCULAR; INTRAVENOUS PRN
Status: DISCONTINUED | OUTPATIENT
Start: 2024-06-13 | End: 2024-06-13 | Stop reason: SDUPTHER

## 2024-06-13 RX ORDER — ROCURONIUM BROMIDE 10 MG/ML
INJECTION, SOLUTION INTRAVENOUS PRN
Status: DISCONTINUED | OUTPATIENT
Start: 2024-06-13 | End: 2024-06-13 | Stop reason: SDUPTHER

## 2024-06-13 RX ORDER — MIDAZOLAM HYDROCHLORIDE 2 MG/2ML
2 INJECTION, SOLUTION INTRAMUSCULAR; INTRAVENOUS
Status: DISCONTINUED | OUTPATIENT
Start: 2024-06-13 | End: 2024-06-13 | Stop reason: HOSPADM

## 2024-06-13 RX ORDER — HYDROMORPHONE HYDROCHLORIDE 1 MG/ML
0.5 INJECTION, SOLUTION INTRAMUSCULAR; INTRAVENOUS; SUBCUTANEOUS EVERY 5 MIN PRN
Status: DISCONTINUED | OUTPATIENT
Start: 2024-06-13 | End: 2024-06-13 | Stop reason: HOSPADM

## 2024-06-13 RX ORDER — CARVEDILOL 6.25 MG/1
6.25 TABLET ORAL 2 TIMES DAILY WITH MEALS
Status: DISCONTINUED | OUTPATIENT
Start: 2024-06-13 | End: 2024-06-17 | Stop reason: HOSPADM

## 2024-06-13 RX ORDER — INSULIN LISPRO 100 [IU]/ML
0-4 INJECTION, SOLUTION INTRAVENOUS; SUBCUTANEOUS NIGHTLY
Status: DISCONTINUED | OUTPATIENT
Start: 2024-06-13 | End: 2024-06-13

## 2024-06-13 RX ADMIN — GABAPENTIN 300 MG: 300 CAPSULE ORAL at 20:40

## 2024-06-13 RX ADMIN — INSULIN LISPRO 4 UNITS: 100 INJECTION, SOLUTION INTRAVENOUS; SUBCUTANEOUS at 09:50

## 2024-06-13 RX ADMIN — CARVEDILOL 6.25 MG: 6.25 TABLET, FILM COATED ORAL at 10:08

## 2024-06-13 RX ADMIN — LIDOCAINE HYDROCHLORIDE 80 MG: 20 INJECTION, SOLUTION INTRAVENOUS at 15:38

## 2024-06-13 RX ADMIN — HYDROMORPHONE HYDROCHLORIDE 0.5 MG: 1 INJECTION, SOLUTION INTRAMUSCULAR; INTRAVENOUS; SUBCUTANEOUS at 18:02

## 2024-06-13 RX ADMIN — METHOCARBAMOL 1000 MG: 500 TABLET ORAL at 20:40

## 2024-06-13 RX ADMIN — ACETAMINOPHEN 650 MG: 325 TABLET ORAL at 10:07

## 2024-06-13 RX ADMIN — HYDROMORPHONE HYDROCHLORIDE 0.5 MG: 1 INJECTION, SOLUTION INTRAMUSCULAR; INTRAVENOUS; SUBCUTANEOUS at 05:49

## 2024-06-13 RX ADMIN — INSULIN LISPRO 8 UNITS: 100 INJECTION, SOLUTION INTRAVENOUS; SUBCUTANEOUS at 20:40

## 2024-06-13 RX ADMIN — SODIUM CHLORIDE, PRESERVATIVE FREE 10 ML: 5 INJECTION INTRAVENOUS at 20:40

## 2024-06-13 RX ADMIN — FENTANYL CITRATE 25 MCG: 50 INJECTION, SOLUTION INTRAMUSCULAR; INTRAVENOUS at 17:00

## 2024-06-13 RX ADMIN — INSULIN LISPRO 6 UNITS: 100 INJECTION, SOLUTION INTRAVENOUS; SUBCUTANEOUS at 05:42

## 2024-06-13 RX ADMIN — CEFAZOLIN 2 G: 1 INJECTION, POWDER, FOR SOLUTION INTRAMUSCULAR; INTRAVENOUS at 15:52

## 2024-06-13 RX ADMIN — METHOCARBAMOL 1000 MG: 500 TABLET ORAL at 05:50

## 2024-06-13 RX ADMIN — ROCURONIUM BROMIDE 40 MG: 10 INJECTION, SOLUTION INTRAVENOUS at 15:51

## 2024-06-13 RX ADMIN — HYDROMORPHONE HYDROCHLORIDE 0.5 MG: 1 INJECTION, SOLUTION INTRAMUSCULAR; INTRAVENOUS; SUBCUTANEOUS at 02:23

## 2024-06-13 RX ADMIN — Medication 160 MG: at 15:38

## 2024-06-13 RX ADMIN — SODIUM CHLORIDE: 9 INJECTION, SOLUTION INTRAVENOUS at 11:39

## 2024-06-13 RX ADMIN — ROCURONIUM BROMIDE 10 MG: 10 INJECTION, SOLUTION INTRAVENOUS at 15:38

## 2024-06-13 RX ADMIN — TRANEXAMIC ACID 1 G: 10 INJECTION, SOLUTION INTRAVENOUS at 15:53

## 2024-06-13 RX ADMIN — SODIUM CHLORIDE, POTASSIUM CHLORIDE, SODIUM LACTATE AND CALCIUM CHLORIDE 1000 ML: 600; 310; 30; 20 INJECTION, SOLUTION INTRAVENOUS at 21:36

## 2024-06-13 RX ADMIN — METHOCARBAMOL 1000 MG: 500 TABLET ORAL at 10:07

## 2024-06-13 RX ADMIN — ONDANSETRON 4 MG: 2 INJECTION INTRAMUSCULAR; INTRAVENOUS at 20:56

## 2024-06-13 RX ADMIN — ONDANSETRON 4 MG: 4 TABLET, ORALLY DISINTEGRATING ORAL at 00:21

## 2024-06-13 RX ADMIN — SODIUM CHLORIDE, PRESERVATIVE FREE 10 ML: 5 INJECTION INTRAVENOUS at 10:08

## 2024-06-13 RX ADMIN — ACETAMINOPHEN 650 MG: 325 TABLET ORAL at 20:40

## 2024-06-13 RX ADMIN — ATORVASTATIN CALCIUM 20 MG: 20 TABLET, FILM COATED ORAL at 20:39

## 2024-06-13 RX ADMIN — FENTANYL CITRATE 100 MCG: 50 INJECTION, SOLUTION INTRAMUSCULAR; INTRAVENOUS at 15:38

## 2024-06-13 RX ADMIN — INSULIN LISPRO 8 UNITS: 100 INJECTION, SOLUTION INTRAVENOUS; SUBCUTANEOUS at 11:45

## 2024-06-13 RX ADMIN — HYDROMORPHONE HYDROCHLORIDE 0.5 MG: 1 INJECTION, SOLUTION INTRAMUSCULAR; INTRAVENOUS; SUBCUTANEOUS at 18:07

## 2024-06-13 RX ADMIN — GABAPENTIN 300 MG: 300 CAPSULE ORAL at 10:07

## 2024-06-13 RX ADMIN — SUGAMMADEX 200 MG: 100 INJECTION, SOLUTION INTRAVENOUS at 17:14

## 2024-06-13 RX ADMIN — DEXAMETHASONE SODIUM PHOSPHATE 10 MG: 10 INJECTION INTRAMUSCULAR; INTRAVENOUS at 15:38

## 2024-06-13 RX ADMIN — HYDROMORPHONE HYDROCHLORIDE 0.5 MG: 1 INJECTION, SOLUTION INTRAMUSCULAR; INTRAVENOUS; SUBCUTANEOUS at 09:49

## 2024-06-13 RX ADMIN — CARVEDILOL 6.25 MG: 6.25 TABLET, FILM COATED ORAL at 18:48

## 2024-06-13 RX ADMIN — PROPOFOL 150 MG: 10 INJECTION, EMULSION INTRAVENOUS at 15:38

## 2024-06-13 RX ADMIN — ONDANSETRON 4 MG: 2 INJECTION INTRAMUSCULAR; INTRAVENOUS at 15:38

## 2024-06-13 ASSESSMENT — PAIN SCALES - GENERAL
PAINLEVEL_OUTOF10: 8
PAINLEVEL_OUTOF10: 3
PAINLEVEL_OUTOF10: 9
PAINLEVEL_OUTOF10: 8
PAINLEVEL_OUTOF10: 10
PAINLEVEL_OUTOF10: 3
PAINLEVEL_OUTOF10: 0
PAINLEVEL_OUTOF10: 5
PAINLEVEL_OUTOF10: 4
PAINLEVEL_OUTOF10: 6

## 2024-06-13 ASSESSMENT — PAIN DESCRIPTION - ORIENTATION
ORIENTATION: RIGHT
ORIENTATION: RIGHT;MID
ORIENTATION: RIGHT
ORIENTATION: RIGHT

## 2024-06-13 ASSESSMENT — PAIN DESCRIPTION - DESCRIPTORS
DESCRIPTORS: ACHING;DISCOMFORT;SORE
DESCRIPTORS: SPASM;SHARP;DISCOMFORT
DESCRIPTORS: SPASM;THROBBING
DESCRIPTORS: ACHING;BURNING;DISCOMFORT
DESCRIPTORS: ACHING
DESCRIPTORS: ACHING;BURNING
DESCRIPTORS: SORE;PRESSURE;PENETRATING

## 2024-06-13 ASSESSMENT — PAIN DESCRIPTION - LOCATION
LOCATION: LEG
LOCATION: ARM;LEG
LOCATION: LEG

## 2024-06-13 ASSESSMENT — PAIN DESCRIPTION - PAIN TYPE
TYPE: SURGICAL PAIN

## 2024-06-13 ASSESSMENT — PAIN - FUNCTIONAL ASSESSMENT
PAIN_FUNCTIONAL_ASSESSMENT: NONE - DENIES PAIN
PAIN_FUNCTIONAL_ASSESSMENT: PREVENTS OR INTERFERES SOME ACTIVE ACTIVITIES AND ADLS
PAIN_FUNCTIONAL_ASSESSMENT: PREVENTS OR INTERFERES SOME ACTIVE ACTIVITIES AND ADLS

## 2024-06-13 ASSESSMENT — PAIN SCALES - WONG BAKER: WONGBAKER_NUMERICALRESPONSE: NO HURT

## 2024-06-13 NOTE — CARE COORDINATION
. Met with the pt at the bedside to discuss transition of care. The pt lives alone in a 2 story home. She was shampooing the carpet on the stairs, when the  started falling on top of her and she tumbled down the steps. She sustained fractures in her right distal radius and right femur. She is for surgery today. She was tearful during the interview. She is going through some personal issues and is selling her home. Explained that she will need rehab before she returns home, since she lives alone. She does not want to go to rehab because her sister was in a facility and was not taken care of and ultimately . See cm assessment. Kat Grande RN    Case Management Assessment  Initial Evaluation    Date/Time of Evaluation: 2024 3:02 PM  Assessment Completed by: Kat Grande RN    If patient is discharged prior to next notation, then this note serves as note for discharge by case management.    Patient Name: Sandhya Adame                   YOB: 1955  Diagnosis: Fall down stairs, initial encounter [W10.8XXA]  Fall, initial encounter [W19.XXXA]  Griselda-prosthetic femur fracture at tip of prosthesis, initial encounter [M97.8XXA, Z96.649]                   Date / Time: 2024  6:57 PM    Patient Admission Status: Inpatient   Readmission Risk (Low < 19, Mod (19-27), High > 27): Readmission Risk Score: 11.7    Current PCP: No primary care provider on file.  PCP verified by CM? Yes (Dr Edgardo Colon)    Chart Reviewed: Yes      History Provided by: Patient  Patient Orientation: Alert and Oriented    Patient Cognition: Alert    Hospitalization in the last 30 days (Readmission):  No    If yes, Readmission Assessment in CM Navigator will be completed.    Advance Directives:      Code Status: Full Code   Patient's Primary Decision Maker is: Legal Next of Kin      Discharge Planning:    Patient lives with:   Type of Home:    Primary Care Giver: Self  Patient Support Systems include:

## 2024-06-13 NOTE — ANESTHESIA PRE PROCEDURE
creatinine in 2023 1.3.  Followed by nephrology per patient)    DOS STAFF ADDENDUM:    Patient seen and chart reviewed.  Physical exam and history updated as indicated.  NPO status confirmed.   .   Anesthesia options and plan discussed with patient/legal guardian and family as available.  Risks, benefits and alternatives including, but not limited to stroke, cardiac arrest, nausea, vomiting, pain, chipped or broken teeth are addressed.  Informed consent signed and placed in chart.   Concerns and questions answered.    Anesthesia plan discussed with staff.    Vicky Bolden MD  6/13/2024  3:31 PM     Vicky Bolden MD   6/13/2024

## 2024-06-13 NOTE — DISCHARGE SUMMARY
Physician Discharge Summary     Patient ID:  Sandhya Adame  [id number]  68 y.o.  1955    Admit date: 6/12/2024    Discharge date: 06/17/24 3:15 PM    Admitting Physician: Dat Saini MD     Admission Diagnoses: Fall down stairs, initial encounter [W10.8XXA]  Fall, initial encounter [W19.XXXA]  Griselda-prosthetic femur fracture at tip of prosthesis, initial encounter [M97.8XXA, Z96.649]    Discharge Diagnoses: Principal Problem:    Fall down stairs, initial encounter  Active Problems:    Fall    Hyperglycemia    Hyponatremia  Resolved Problems:    * No resolved hospital problems. *      Admission Condition: stable    Discharged Condition: stable    Indication for Admission: s/p fall down stairs    Hospital Course/Procedures/Operation/treatments:   6/12: Trauma team.  Injury occurred just prior to arrival. Pt is a older female that presented after a mechanical fall down 15 steps. Found to have RLE deformity. Did hit her head, no LOC. Reports R leg pain. Found to have R radius and R femur fx. Orthopedic surgery consulted  6/13: R radius splinted. GCS 15. Pain managed. Plan for OR with ortho today  6/14: Pain managed s/p OR. Nephrology consulted. Lantus added for glucose control  6/15: transferred to SICU overnight for CAMRYN and poor glucose control and anemia. Received 2uRBC on 6/14. Hgb since stable. CAMRYN improving  6/16: glucose improved on lantus. Hb stable. Transfer to floor.   6/17: Transferred out of ICU. Labile blood glucose. CAMRYN resolved. DC to rehab.     Consults:   IP CONSULT TO ORTHOPEDIC SURGERY  INPATIENT CONSULT TO ORTHOTIST/PROSTHETIST  IP CONSULT TO VASCULAR ACCESS TEAM  IP CONSULT TO NEPHROLOGY  IP CONSULT TO VASCULAR ACCESS TEAM    Significant Diagnostic Studies:   XR FEMUR RIGHT (MIN 2 VIEWS)    Result Date: 6/12/2024  EXAMINATION: 2 XRAY VIEWS OF THE RIGHT FEMUR 6/12/2024 9:39 pm COMPARISON: Femur films from today at 7:16 p.m.. HISTORY: ORDERING SYSTEM PROVIDED HISTORY: Fracture TECHNOLOGIST PROVIDED

## 2024-06-13 NOTE — ED NOTES
Pt states she has diarrhea and needed to be cleaned. Pt cleaned and lining changed. External catheter placed on pt

## 2024-06-13 NOTE — OP NOTE
Operative Note      Patient: Sandhya Adame  YOB: 1955  MRN: [id number]    Date of Procedure: 6/13/2024    Pre-Op Diagnosis Codes:     * Closed fracture of distal end of right femur, unspecified fracture morphology, initial encounter ([facility]) [S72.401A], severely comminuted    Post-Op Diagnosis: Same       Procedure:  Open reduction internal fixation right severely comminuted femoral shaft fracture around a total knee arthroplasty with retrograde femoral nail    Surgeon(s):  Nathan Angeles MD    Assistant:   Resident: Jose Hardin DO; Edgardo Sahni DO    Anesthesia: General    Estimated Blood Loss (mL): less than 100     Complications: None    Specimens:   * No specimens in log *    Implants:  Implant Name Type Inv. Item Serial No.  Lot No. LRB No. Used Action   NAIL IM RETROGRADE 5 DEG 11X360 MM FEM TI STRL RFNADVANCED - [id number]  NAIL IM RETROGRADE 5 DEG 11X360 MM FEM TI STRL RFNADVANCED  [facility]NTHES [facility] [id number] Right 1 Implanted   SCREW LCK F/IM NAIL 5X76MM XL25 - [id number]  SCREW LCK F/IM NAIL 5X76MM XL25  DEPUY SYNTHES [place] [id number] Right 1 Implanted   SCREW LK F/IM NAIL 5X56MM XL25 ST - [id number]  SCREW LK F/IM NAIL 5X56MM XL25 ST  DEPUY SYNTHES US[employer] [id number] Right 1 Implanted   CAP END F/RFNA 0MM STERILE - [id number]  CAP END F/RFNA 0MM STERILE  DEPUY SYNTHES [place]-WD [id number] Right 1 Implanted   SCREW LOCKING FOR IM NAIL 5X66MM XL25 SILX - [id number]  SCREW LOCKING FOR IM NAIL 5X66MM XL25 SILX  DEPUY SYNTHES USA-WD [id number] Right 1 Implanted   SCREW L0CK F/IM NAIL 5X70MM XL25 STR - [id number]  SCREW L0CK F/IM NAIL 5X70MM XL25 STR  [employer] SYNTHES USA-WD [id number] Right 1 Implanted         Drains:   Urinary Catheter 06/13/24 Saucedo (Active)       Findings:  Infection Present At Time Of Surgery (PATOS) (choose all levels that have infection present):  No infection present  Other Findings: Severe comminution.  Utilize a Synthes retrograde 11 mm x 360 mm femoral nail

## 2024-06-13 NOTE — ANESTHESIA POSTPROCEDURE EVALUATION
Department of Anesthesiology  Postprocedure Note    Patient: Sandhya Adame  MRN: [id number]  YOB: 1955  Date of evaluation: 6/13/2024    Procedure Summary       Date: 06/13/24 Room / Location: [address]    Anesthesia Start: 1531 Anesthesia Stop: 1728    Procedure: FEMUR OPEN REDUCTION INTERNAL FIXATION RIGHT DISTAL PERIPROSTHETIC FRACTURE (Right: Leg Upper) Diagnosis:       Closed fracture of distal end of right femur, unspecified fracture morphology, initial encounter ([facility])      (Closed fracture of distal end of right femur, unspecified fracture morphology, initial encounter ([facility]) [S72.401A])    Surgeons: Nathan Angeles MD Responsible Provider: Vicky Bolden MD    Anesthesia Type: general ASA Status: 3            Anesthesia Type: No value filed.    Kassie Phase I: Kassie Score: 9    Kassie Phase II:      Anesthesia Post Evaluation    Patient location during evaluation: PACU  Patient participation: complete - patient participated  Level of consciousness: awake and alert  Airway patency: patent  Nausea & Vomiting: no nausea and no vomiting  Cardiovascular status: blood pressure returned to baseline and hemodynamically stable  Respiratory status: acceptable and spontaneous ventilation  Hydration status: euvolemic  Multimodal analgesia pain management approach  Pain management: adequate    No notable events documented.

## 2024-06-14 LAB
ANION GAP SERPL CALCULATED.3IONS-SCNC: 12 MMOL/L (ref 7–16)
ANION GAP SERPL CALCULATED.3IONS-SCNC: 16 MMOL/L (ref 7–16)
BASOPHILS # BLD: 0 K/UL (ref 0–0.2)
BASOPHILS NFR BLD: 0 % (ref 0–2)
BUN SERPL-MCNC: 62 MG/DL (ref 6–23)
BUN SERPL-MCNC: 68 MG/DL (ref 6–23)
CALCIUM SERPL-MCNC: 8 MG/DL (ref 8.6–10.2)
CALCIUM SERPL-MCNC: 8.3 MG/DL (ref 8.6–10.2)
CHLORIDE SERPL-SCNC: 95 MMOL/L (ref 98–107)
CHLORIDE SERPL-SCNC: 96 MMOL/L (ref 98–107)
CO2 SERPL-SCNC: 18 MMOL/L (ref 22–29)
CO2 SERPL-SCNC: 20 MMOL/L (ref 22–29)
CREAT SERPL-MCNC: 2.7 MG/DL (ref 0.5–1)
CREAT SERPL-MCNC: 2.9 MG/DL (ref 0.5–1)
CREAT UR-MCNC: 108.4 MG/DL (ref 29–226)
EOSINOPHIL # BLD: 0 K/UL (ref 0.05–0.5)
EOSINOPHILS RELATIVE PERCENT: 0 % (ref 0–6)
ERYTHROCYTE [DISTWIDTH] IN BLOOD BY AUTOMATED COUNT: 13.9 % (ref 11.5–15)
ERYTHROCYTE [DISTWIDTH] IN BLOOD BY AUTOMATED COUNT: 14 % (ref 11.5–15)
GFR, ESTIMATED: 17 ML/MIN/1.73M2
GFR, ESTIMATED: 19 ML/MIN/1.73M2
GLUCOSE BLD-MCNC: 110 MG/DL (ref 74–99)
GLUCOSE BLD-MCNC: 163 MG/DL (ref 74–99)
GLUCOSE BLD-MCNC: 195 MG/DL (ref 74–99)
GLUCOSE BLD-MCNC: 237 MG/DL (ref 74–99)
GLUCOSE BLD-MCNC: 336 MG/DL (ref 74–99)
GLUCOSE BLD-MCNC: 347 MG/DL (ref 74–99)
GLUCOSE BLD-MCNC: 440 MG/DL (ref 74–99)
GLUCOSE SERPL-MCNC: 280 MG/DL (ref 74–99)
GLUCOSE SERPL-MCNC: 296 MG/DL (ref 74–99)
HCT VFR BLD AUTO: 20.5 % (ref 34–48)
HCT VFR BLD AUTO: 21 % (ref 34–48)
HCT VFR BLD AUTO: 22.1 % (ref 34–48)
HGB BLD-MCNC: 6.5 G/DL (ref 11.5–15.5)
HGB BLD-MCNC: 6.7 G/DL (ref 11.5–15.5)
HGB BLD-MCNC: 7.2 G/DL (ref 11.5–15.5)
LYMPHOCYTES NFR BLD: 0.25 K/UL (ref 1.5–4)
LYMPHOCYTES RELATIVE PERCENT: 2 % (ref 20–42)
MCH RBC QN AUTO: 29.5 PG (ref 26–35)
MCH RBC QN AUTO: 30.5 PG (ref 26–35)
MCHC RBC AUTO-ENTMCNC: 31.7 G/DL (ref 32–34.5)
MCHC RBC AUTO-ENTMCNC: 31.9 G/DL (ref 32–34.5)
MCV RBC AUTO: 92.5 FL (ref 80–99.9)
MCV RBC AUTO: 96.2 FL (ref 80–99.9)
MONOCYTES NFR BLD: 0.62 K/UL (ref 0.1–0.95)
MONOCYTES NFR BLD: 4 % (ref 2–12)
NEUTROPHILS NFR BLD: 94 % (ref 43–80)
NEUTS SEG NFR BLD: 13.04 K/UL (ref 1.8–7.3)
PLATELET # BLD AUTO: 213 K/UL (ref 130–450)
PLATELET # BLD AUTO: 214 K/UL (ref 130–450)
PMV BLD AUTO: 10.3 FL (ref 7–12)
PMV BLD AUTO: 10.4 FL (ref 7–12)
POTASSIUM SERPL-SCNC: 4.7 MMOL/L (ref 3.5–5)
POTASSIUM SERPL-SCNC: 5.3 MMOL/L (ref 3.5–5)
RBC # BLD AUTO: 2.13 M/UL (ref 3.5–5.5)
RBC # BLD AUTO: 2.27 M/UL (ref 3.5–5.5)
RBC # BLD: ABNORMAL 10*6/UL
SODIUM SERPL-SCNC: 127 MMOL/L (ref 132–146)
SODIUM SERPL-SCNC: 130 MMOL/L (ref 132–146)
TOTAL PROTEIN, URINE: 8 MG/DL (ref 0–12)
URINE TOTAL PROTEIN CREATININE RATIO: 0.07 (ref 0–0.2)
WBC # BLD: ABNORMAL 10*3/UL
WBC OTHER # BLD: 12.1 K/UL (ref 4.5–11.5)
WBC OTHER # BLD: 13.9 K/UL (ref 4.5–11.5)

## 2024-06-14 PROCEDURE — 6370000000 HC RX 637 (ALT 250 FOR IP): Performed by: PHYSICAL THERAPY ASSISTANT

## 2024-06-14 PROCEDURE — 6370000000 HC RX 637 (ALT 250 FOR IP)

## 2024-06-14 PROCEDURE — 85018 HEMOGLOBIN: CPT

## 2024-06-14 PROCEDURE — 6360000002 HC RX W HCPCS: Performed by: SPECIALIST/TECHNOLOGIST

## 2024-06-14 PROCEDURE — 2580000003 HC RX 258: Performed by: SPECIALIST/TECHNOLOGIST

## 2024-06-14 PROCEDURE — 2580000003 HC RX 258: Performed by: STUDENT IN AN ORGANIZED HEALTH CARE EDUCATION/TRAINING PROGRAM

## 2024-06-14 PROCEDURE — 6370000000 HC RX 637 (ALT 250 FOR IP): Performed by: STUDENT IN AN ORGANIZED HEALTH CARE EDUCATION/TRAINING PROGRAM

## 2024-06-14 PROCEDURE — 6370000000 HC RX 637 (ALT 250 FOR IP): Performed by: SPECIALIST/TECHNOLOGIST

## 2024-06-14 PROCEDURE — 97161 PT EVAL LOW COMPLEX 20 MIN: CPT

## 2024-06-14 PROCEDURE — 83036 HEMOGLOBIN GLYCOSYLATED A1C: CPT

## 2024-06-14 PROCEDURE — 82962 GLUCOSE BLOOD TEST: CPT

## 2024-06-14 PROCEDURE — 99233 SBSQ HOSP IP/OBS HIGH 50: CPT | Performed by: SURGERY

## 2024-06-14 PROCEDURE — 87081 CULTURE SCREEN ONLY: CPT

## 2024-06-14 PROCEDURE — P9016 RBC LEUKOCYTES REDUCED: HCPCS

## 2024-06-14 PROCEDURE — 84156 ASSAY OF PROTEIN URINE: CPT

## 2024-06-14 PROCEDURE — 80048 BASIC METABOLIC PNL TOTAL CA: CPT

## 2024-06-14 PROCEDURE — 6360000002 HC RX W HCPCS: Performed by: STUDENT IN AN ORGANIZED HEALTH CARE EDUCATION/TRAINING PROGRAM

## 2024-06-14 PROCEDURE — 85025 COMPLETE CBC W/AUTO DIFF WBC: CPT

## 2024-06-14 PROCEDURE — 2000000000 HC ICU R&B

## 2024-06-14 PROCEDURE — 30243N1 TRANSFUSION OF NONAUTOLOGOUS RED BLOOD CELLS INTO CENTRAL VEIN, PERCUTANEOUS APPROACH: ICD-10-PCS | Performed by: SURGERY

## 2024-06-14 PROCEDURE — 36430 TRANSFUSION BLD/BLD COMPNT: CPT

## 2024-06-14 PROCEDURE — 97165 OT EVAL LOW COMPLEX 30 MIN: CPT

## 2024-06-14 PROCEDURE — 97530 THERAPEUTIC ACTIVITIES: CPT

## 2024-06-14 PROCEDURE — 97535 SELF CARE MNGMENT TRAINING: CPT

## 2024-06-14 PROCEDURE — 85027 COMPLETE CBC AUTOMATED: CPT

## 2024-06-14 PROCEDURE — 36415 COLL VENOUS BLD VENIPUNCTURE: CPT

## 2024-06-14 PROCEDURE — 82570 ASSAY OF URINE CREATININE: CPT

## 2024-06-14 PROCEDURE — 51798 US URINE CAPACITY MEASURE: CPT

## 2024-06-14 PROCEDURE — L1832 KO ADJ JNT POS R SUP PRE CST: HCPCS

## 2024-06-14 PROCEDURE — 85014 HEMATOCRIT: CPT

## 2024-06-14 RX ORDER — SODIUM CHLORIDE 9 MG/ML
INJECTION, SOLUTION INTRAVENOUS PRN
Status: DISCONTINUED | OUTPATIENT
Start: 2024-06-14 | End: 2024-06-17 | Stop reason: HOSPADM

## 2024-06-14 RX ORDER — INSULIN GLARGINE 100 [IU]/ML
10 INJECTION, SOLUTION SUBCUTANEOUS NIGHTLY
Status: DISCONTINUED | OUTPATIENT
Start: 2024-06-14 | End: 2024-06-17

## 2024-06-14 RX ADMIN — ASPIRIN 325 MG: 325 TABLET, COATED ORAL at 09:14

## 2024-06-14 RX ADMIN — SODIUM CHLORIDE, PRESERVATIVE FREE 10 ML: 5 INJECTION INTRAVENOUS at 11:49

## 2024-06-14 RX ADMIN — METHOCARBAMOL 1000 MG: 500 TABLET ORAL at 17:05

## 2024-06-14 RX ADMIN — INSULIN HUMAN 10 UNITS: 100 INJECTION, SOLUTION PARENTERAL at 15:54

## 2024-06-14 RX ADMIN — ACETAMINOPHEN 650 MG: 325 TABLET ORAL at 00:14

## 2024-06-14 RX ADMIN — OXYCODONE 5 MG: 5 TABLET ORAL at 09:20

## 2024-06-14 RX ADMIN — VERAPAMIL HYDROCHLORIDE 300 MG: 180 TABLET, FILM COATED, EXTENDED RELEASE ORAL at 20:40

## 2024-06-14 RX ADMIN — HYDROMORPHONE HYDROCHLORIDE 0.5 MG: 1 INJECTION, SOLUTION INTRAMUSCULAR; INTRAVENOUS; SUBCUTANEOUS at 18:21

## 2024-06-14 RX ADMIN — ERGOCALCIFEROL 50000 UNITS: 1.25 CAPSULE ORAL at 09:25

## 2024-06-14 RX ADMIN — SODIUM CHLORIDE: 9 INJECTION, SOLUTION INTRAVENOUS at 16:10

## 2024-06-14 RX ADMIN — SODIUM CHLORIDE, PRESERVATIVE FREE 10 ML: 5 INJECTION INTRAVENOUS at 09:23

## 2024-06-14 RX ADMIN — GABAPENTIN 300 MG: 300 CAPSULE ORAL at 09:14

## 2024-06-14 RX ADMIN — ATORVASTATIN CALCIUM 20 MG: 20 TABLET, FILM COATED ORAL at 20:39

## 2024-06-14 RX ADMIN — INSULIN LISPRO 12 UNITS: 100 INJECTION, SOLUTION INTRAVENOUS; SUBCUTANEOUS at 17:05

## 2024-06-14 RX ADMIN — ACETAMINOPHEN 650 MG: 325 TABLET ORAL at 19:52

## 2024-06-14 RX ADMIN — INSULIN LISPRO 16 UNITS: 100 INJECTION, SOLUTION INTRAVENOUS; SUBCUTANEOUS at 11:30

## 2024-06-14 RX ADMIN — INSULIN LISPRO 4 UNITS: 100 INJECTION, SOLUTION INTRAVENOUS; SUBCUTANEOUS at 03:52

## 2024-06-14 RX ADMIN — GABAPENTIN 300 MG: 300 CAPSULE ORAL at 20:39

## 2024-06-14 RX ADMIN — SODIUM CHLORIDE: 9 INJECTION, SOLUTION INTRAVENOUS at 19:12

## 2024-06-14 RX ADMIN — METHOCARBAMOL 1000 MG: 500 TABLET ORAL at 20:39

## 2024-06-14 RX ADMIN — METHOCARBAMOL 1000 MG: 500 TABLET ORAL at 13:23

## 2024-06-14 RX ADMIN — HYDROMORPHONE HYDROCHLORIDE 0.5 MG: 1 INJECTION, SOLUTION INTRAMUSCULAR; INTRAVENOUS; SUBCUTANEOUS at 11:48

## 2024-06-14 RX ADMIN — CARVEDILOL 6.25 MG: 6.25 TABLET, FILM COATED ORAL at 17:05

## 2024-06-14 RX ADMIN — INSULIN GLARGINE 10 UNITS: 100 INJECTION, SOLUTION SUBCUTANEOUS at 20:40

## 2024-06-14 RX ADMIN — INSULIN LISPRO 4 UNITS: 100 INJECTION, SOLUTION INTRAVENOUS; SUBCUTANEOUS at 00:14

## 2024-06-14 RX ADMIN — WATER 2000 MG: 1 INJECTION INTRAMUSCULAR; INTRAVENOUS; SUBCUTANEOUS at 09:12

## 2024-06-14 RX ADMIN — Medication 50 MG: at 09:14

## 2024-06-14 RX ADMIN — OXYCODONE 5 MG: 5 TABLET ORAL at 15:53

## 2024-06-14 RX ADMIN — SODIUM CHLORIDE, PRESERVATIVE FREE 10 ML: 5 INJECTION INTRAVENOUS at 20:40

## 2024-06-14 RX ADMIN — OXYCODONE HYDROCHLORIDE 10 MG: 10 TABLET ORAL at 00:35

## 2024-06-14 RX ADMIN — Medication 500 MG: at 09:14

## 2024-06-14 RX ADMIN — METHOCARBAMOL 1000 MG: 500 TABLET ORAL at 09:13

## 2024-06-14 RX ADMIN — CARVEDILOL 6.25 MG: 6.25 TABLET, FILM COATED ORAL at 09:14

## 2024-06-14 ASSESSMENT — PAIN - FUNCTIONAL ASSESSMENT
PAIN_FUNCTIONAL_ASSESSMENT: PREVENTS OR INTERFERES SOME ACTIVE ACTIVITIES AND ADLS
PAIN_FUNCTIONAL_ASSESSMENT: PREVENTS OR INTERFERES WITH MANY ACTIVE NOT PASSIVE ACTIVITIES
PAIN_FUNCTIONAL_ASSESSMENT: PREVENTS OR INTERFERES SOME ACTIVE ACTIVITIES AND ADLS

## 2024-06-14 ASSESSMENT — PAIN SCALES - GENERAL
PAINLEVEL_OUTOF10: 5
PAINLEVEL_OUTOF10: 4
PAINLEVEL_OUTOF10: 4
PAINLEVEL_OUTOF10: 7
PAINLEVEL_OUTOF10: 7
PAINLEVEL_OUTOF10: 6

## 2024-06-14 ASSESSMENT — PAIN DESCRIPTION - ORIENTATION
ORIENTATION: RIGHT

## 2024-06-14 ASSESSMENT — PAIN DESCRIPTION - ONSET
ONSET: ON-GOING

## 2024-06-14 ASSESSMENT — PAIN DESCRIPTION - DESCRIPTORS
DESCRIPTORS: ACHING;BURNING;DISCOMFORT
DESCRIPTORS: ACHING;DISCOMFORT;SHARP;SHOOTING
DESCRIPTORS: ACHING;SPASM;SHOOTING
DESCRIPTORS: ACHING;SPASM;SHARP
DESCRIPTORS: ACHING;SPASM;SHOOTING
DESCRIPTORS: ACHING;THROBBING;DISCOMFORT
DESCRIPTORS: ACHING;BURNING;DISCOMFORT

## 2024-06-14 ASSESSMENT — PAIN DESCRIPTION - LOCATION
LOCATION: KNEE
LOCATION: KNEE;LEG
LOCATION: HIP;LEG;KNEE
LOCATION: KNEE
LOCATION: LEG;HIP
LOCATION: KNEE;LEG
LOCATION: KNEE

## 2024-06-14 ASSESSMENT — PAIN DESCRIPTION - FREQUENCY
FREQUENCY: CONTINUOUS

## 2024-06-14 ASSESSMENT — PAIN DESCRIPTION - PAIN TYPE
TYPE: SURGICAL PAIN
TYPE: SURGICAL PAIN
TYPE: ACUTE PAIN;SURGICAL PAIN
TYPE: SURGICAL PAIN
TYPE: ACUTE PAIN;SURGICAL PAIN
TYPE: SURGICAL PAIN

## 2024-06-14 NOTE — CARE COORDINATION
6/14. Met with the pt to discuss her discharge plan. She is in agreement with going to a rehab facility. She made 2 choices. 1 [facility] 2. [facility]. Referrals made to both facilities. [facility] is not in network with the pt's insurance. The pt is to receive blood transfusion today for H/H 6.5/20.5. Kat Grande RN

## 2024-06-14 NOTE — CARE COORDINATION
06/14/24 Pt has been accepted to [facility] to be started on Saturday [place] destination and [place] complete Ambulance form in soft chart will need completed day of discharge This was tubed to 54 and call made to nurses station to advise Electronically signed by Kings Lassiter RN on 6/14/2024 at 5:55 PM

## 2024-06-14 NOTE — DISCHARGE INSTR - COC
Continuity of Care Form    Patient Name: Sandhya Adame   :  1955  MRN:  [id number]    Admit date:  2024  Discharge date:  2024    Code Status Order: Full Code   Advance Directives:     Admitting Physician:  Dat Saini MD  PCP: No primary care provider on file.    Discharging Nurse: SONIDO Del Castillo  Discharging Hospital Unit/Room#: 5207  Discharging Unit Phone Number: [phone]    Emergency Contact:   Extended Emergency Contact Information  Primary Emergency Contact: Tari Rader  Mobile Phone: [phone]  Relation: Niece/Nephew  Preferred language: English   needed? No    Past Surgical History:  Past Surgical History:   Procedure Laterality Date    FEMUR SURGERY Right 2024    FEMUR OPEN REDUCTION INTERNAL FIXATION RIGHT DISTAL PERIPROSTHETIC FRACTURE performed by Nathan Angeles MD at [facility] OR       Immunization History:     There is no immunization history on file for this patient.    Active Problems:  Patient Active Problem List   Diagnosis Code    Fall down stairs, initial encounter W10.8XXA    Fall W19.XXXA       Isolation/Infection:   Isolation            No Isolation          Patient Infection Status       None to display            Nurse Assessment:  Last Vital Signs: BP (!) 140/50   Pulse 93   Temp (!) 96.7 °F (35.9 °C) (Temporal)   Resp 18   Ht 1.651 m (5' 5\")   Wt 65.8 kg (145 lb)   SpO2 99%   BMI 24.13 kg/m²     Last documented pain score (0-10 scale): Pain Level: 6  Last Weight:   Wt Readings from Last 1 Encounters:   24 65.8 kg (145 lb)     Mental Status:  oriented, alert, and thought processes intact    IV Access:  - None    Nursing Mobility/ADLs:  Walking   Assisted  Transfer  Assisted  Bathing  Assisted  Dressing  Assisted  Toileting  Assisted  Feeding  Independent  Med Admin  Assisted  Med Delivery   whole    Wound Care Documentation and Therapy:  Incision 24 Knee Right (Active)   Dressing Status Clean;Dry;Intact 24   Incision

## 2024-06-15 PROBLEM — R73.9 HYPERGLYCEMIA: Status: ACTIVE | Noted: 2024-06-15

## 2024-06-15 PROBLEM — E87.1 HYPONATREMIA: Status: ACTIVE | Noted: 2024-06-15

## 2024-06-15 LAB
ABO/RH: NORMAL
ANION GAP SERPL CALCULATED.3IONS-SCNC: 10 MMOL/L (ref 7–16)
ANION GAP SERPL CALCULATED.3IONS-SCNC: 14 MMOL/L (ref 7–16)
ANION GAP SERPL CALCULATED.3IONS-SCNC: 14 MMOL/L (ref 7–16)
ANION GAP SERPL CALCULATED.3IONS-SCNC: 9 MMOL/L (ref 7–16)
ANTIBODY SCREEN: NEGATIVE
ARM BAND NUMBER: NORMAL
BASOPHILS # BLD: 0.03 K/UL (ref 0–0.2)
BASOPHILS NFR BLD: 0 % (ref 0–2)
BLOOD BANK BLOOD PRODUCT EXPIRATION DATE: NORMAL
BLOOD BANK BLOOD PRODUCT EXPIRATION DATE: NORMAL
BLOOD BANK DISPENSE STATUS: NORMAL
BLOOD BANK DISPENSE STATUS: NORMAL
BLOOD BANK ISBT PRODUCT BLOOD TYPE: 5100
BLOOD BANK ISBT PRODUCT BLOOD TYPE: 9500
BLOOD BANK PRODUCT CODE: NORMAL
BLOOD BANK PRODUCT CODE: NORMAL
BLOOD BANK SAMPLE EXPIRATION: NORMAL
BLOOD BANK UNIT TYPE AND RH: NORMAL
BLOOD BANK UNIT TYPE AND RH: NORMAL
BPU ID: NORMAL
BPU ID: NORMAL
BUN SERPL-MCNC: 63 MG/DL (ref 6–23)
BUN SERPL-MCNC: 64 MG/DL (ref 6–23)
BUN SERPL-MCNC: 65 MG/DL (ref 6–23)
BUN SERPL-MCNC: 68 MG/DL (ref 6–23)
CALCIUM SERPL-MCNC: 7.7 MG/DL (ref 8.6–10.2)
CALCIUM SERPL-MCNC: 7.7 MG/DL (ref 8.6–10.2)
CALCIUM SERPL-MCNC: 7.9 MG/DL (ref 8.6–10.2)
CALCIUM SERPL-MCNC: 7.9 MG/DL (ref 8.6–10.2)
CHLORIDE SERPL-SCNC: 100 MMOL/L (ref 98–107)
CHLORIDE SERPL-SCNC: 93 MMOL/L (ref 98–107)
CHLORIDE SERPL-SCNC: 94 MMOL/L (ref 98–107)
CHLORIDE SERPL-SCNC: 97 MMOL/L (ref 98–107)
CO2 SERPL-SCNC: 16 MMOL/L (ref 22–29)
CO2 SERPL-SCNC: 19 MMOL/L (ref 22–29)
CO2 SERPL-SCNC: 20 MMOL/L (ref 22–29)
COMPONENT: NORMAL
COMPONENT: NORMAL
CREAT SERPL-MCNC: 1.8 MG/DL (ref 0.5–1)
CREAT SERPL-MCNC: 2.1 MG/DL (ref 0.5–1)
CREAT SERPL-MCNC: 2.3 MG/DL (ref 0.5–1)
CREAT SERPL-MCNC: 2.5 MG/DL (ref 0.5–1)
CROSSMATCH RESULT: NORMAL
CROSSMATCH RESULT: NORMAL
EOSINOPHIL # BLD: 0.07 K/UL (ref 0.05–0.5)
EOSINOPHILS RELATIVE PERCENT: 1 % (ref 0–6)
ERYTHROCYTE [DISTWIDTH] IN BLOOD BY AUTOMATED COUNT: 13.7 % (ref 11.5–15)
GFR, ESTIMATED: 20 ML/MIN/1.73M2
GFR, ESTIMATED: 23 ML/MIN/1.73M2
GFR, ESTIMATED: 26 ML/MIN/1.73M2
GFR, ESTIMATED: 30 ML/MIN/1.73M2
GLUCOSE BLD-MCNC: 146 MG/DL (ref 74–99)
GLUCOSE BLD-MCNC: 166 MG/DL (ref 74–99)
GLUCOSE BLD-MCNC: 171 MG/DL (ref 74–99)
GLUCOSE BLD-MCNC: 179 MG/DL (ref 74–99)
GLUCOSE BLD-MCNC: 286 MG/DL (ref 74–99)
GLUCOSE BLD-MCNC: 299 MG/DL (ref 74–99)
GLUCOSE SERPL-MCNC: 102 MG/DL (ref 74–99)
GLUCOSE SERPL-MCNC: 138 MG/DL (ref 74–99)
GLUCOSE SERPL-MCNC: 194 MG/DL (ref 74–99)
GLUCOSE SERPL-MCNC: 288 MG/DL (ref 74–99)
HBA1C MFR BLD: 6.9 % (ref 4–5.6)
HCT VFR BLD AUTO: 23.7 % (ref 34–48)
HCT VFR BLD AUTO: 24.8 % (ref 34–48)
HCT VFR BLD AUTO: 25.3 % (ref 34–48)
HGB BLD-MCNC: 7.8 G/DL (ref 11.5–15.5)
IMM GRANULOCYTES # BLD AUTO: 0.09 K/UL (ref 0–0.58)
IMM GRANULOCYTES NFR BLD: 1 % (ref 0–5)
LYMPHOCYTES NFR BLD: 1.84 K/UL (ref 1.5–4)
LYMPHOCYTES RELATIVE PERCENT: 17 % (ref 20–42)
MCH RBC QN AUTO: 30.1 PG (ref 26–35)
MCHC RBC AUTO-ENTMCNC: 32.9 G/DL (ref 32–34.5)
MCV RBC AUTO: 91.5 FL (ref 80–99.9)
MONOCYTES NFR BLD: 1.39 K/UL (ref 0.1–0.95)
MONOCYTES NFR BLD: 13 % (ref 2–12)
NEUTROPHILS NFR BLD: 69 % (ref 43–80)
NEUTS SEG NFR BLD: 7.66 K/UL (ref 1.8–7.3)
PLATELET # BLD AUTO: 185 K/UL (ref 130–450)
PMV BLD AUTO: 10.1 FL (ref 7–12)
POTASSIUM SERPL-SCNC: 4.4 MMOL/L (ref 3.5–5)
POTASSIUM SERPL-SCNC: 4.5 MMOL/L (ref 3.5–5)
POTASSIUM SERPL-SCNC: 4.6 MMOL/L (ref 3.5–5)
POTASSIUM SERPL-SCNC: 4.7 MMOL/L (ref 3.5–5)
RBC # BLD AUTO: 2.59 M/UL (ref 3.5–5.5)
SODIUM SERPL-SCNC: 124 MMOL/L (ref 132–146)
SODIUM SERPL-SCNC: 127 MMOL/L (ref 132–146)
SODIUM SERPL-SCNC: 127 MMOL/L (ref 132–146)
SODIUM SERPL-SCNC: 130 MMOL/L (ref 132–146)
TRANSFUSION STATUS: NORMAL
TRANSFUSION STATUS: NORMAL
UNIT DIVISION: 0
UNIT DIVISION: 0
UNIT ISSUE DATE/TIME: NORMAL
UNIT ISSUE DATE/TIME: NORMAL
WBC OTHER # BLD: 11.1 K/UL (ref 4.5–11.5)

## 2024-06-15 PROCEDURE — 6370000000 HC RX 637 (ALT 250 FOR IP): Performed by: SPECIALIST/TECHNOLOGIST

## 2024-06-15 PROCEDURE — 6370000000 HC RX 637 (ALT 250 FOR IP)

## 2024-06-15 PROCEDURE — 99291 CRITICAL CARE FIRST HOUR: CPT | Performed by: STUDENT IN AN ORGANIZED HEALTH CARE EDUCATION/TRAINING PROGRAM

## 2024-06-15 PROCEDURE — 51798 US URINE CAPACITY MEASURE: CPT

## 2024-06-15 PROCEDURE — 2580000003 HC RX 258: Performed by: STUDENT IN AN ORGANIZED HEALTH CARE EDUCATION/TRAINING PROGRAM

## 2024-06-15 PROCEDURE — 85025 COMPLETE CBC W/AUTO DIFF WBC: CPT

## 2024-06-15 PROCEDURE — 51701 INSERT BLADDER CATHETER: CPT

## 2024-06-15 PROCEDURE — 2700000000 HC OXYGEN THERAPY PER DAY

## 2024-06-15 PROCEDURE — 6370000000 HC RX 637 (ALT 250 FOR IP): Performed by: STUDENT IN AN ORGANIZED HEALTH CARE EDUCATION/TRAINING PROGRAM

## 2024-06-15 PROCEDURE — 2580000003 HC RX 258

## 2024-06-15 PROCEDURE — 6360000002 HC RX W HCPCS: Performed by: STUDENT IN AN ORGANIZED HEALTH CARE EDUCATION/TRAINING PROGRAM

## 2024-06-15 PROCEDURE — 36415 COLL VENOUS BLD VENIPUNCTURE: CPT

## 2024-06-15 PROCEDURE — 2000000000 HC ICU R&B

## 2024-06-15 PROCEDURE — 82962 GLUCOSE BLOOD TEST: CPT

## 2024-06-15 PROCEDURE — 82330 ASSAY OF CALCIUM: CPT

## 2024-06-15 PROCEDURE — 2580000003 HC RX 258: Performed by: SPECIALIST/TECHNOLOGIST

## 2024-06-15 PROCEDURE — 6360000002 HC RX W HCPCS

## 2024-06-15 PROCEDURE — 6370000000 HC RX 637 (ALT 250 FOR IP): Performed by: PHYSICAL THERAPY ASSISTANT

## 2024-06-15 RX ADMIN — METHOCARBAMOL 1000 MG: 500 TABLET ORAL at 13:25

## 2024-06-15 RX ADMIN — Medication 50 MG: at 08:42

## 2024-06-15 RX ADMIN — INSULIN LISPRO 4 UNITS: 100 INJECTION, SOLUTION INTRAVENOUS; SUBCUTANEOUS at 20:02

## 2024-06-15 RX ADMIN — POLYETHYLENE GLYCOL 3350 17 G: 17 POWDER, FOR SOLUTION ORAL at 08:41

## 2024-06-15 RX ADMIN — ACETAMINOPHEN 650 MG: 325 TABLET ORAL at 13:25

## 2024-06-15 RX ADMIN — CARVEDILOL 6.25 MG: 6.25 TABLET, FILM COATED ORAL at 08:41

## 2024-06-15 RX ADMIN — HYDROMORPHONE HYDROCHLORIDE 0.5 MG: 1 INJECTION, SOLUTION INTRAMUSCULAR; INTRAVENOUS; SUBCUTANEOUS at 22:08

## 2024-06-15 RX ADMIN — CARVEDILOL 6.25 MG: 6.25 TABLET, FILM COATED ORAL at 16:24

## 2024-06-15 RX ADMIN — ACETAMINOPHEN 650 MG: 325 TABLET ORAL at 20:01

## 2024-06-15 RX ADMIN — GABAPENTIN 300 MG: 300 CAPSULE ORAL at 08:41

## 2024-06-15 RX ADMIN — METHOCARBAMOL 1000 MG: 500 TABLET ORAL at 08:41

## 2024-06-15 RX ADMIN — GABAPENTIN 300 MG: 300 CAPSULE ORAL at 20:02

## 2024-06-15 RX ADMIN — HYDROMORPHONE HYDROCHLORIDE 0.5 MG: 1 INJECTION, SOLUTION INTRAMUSCULAR; INTRAVENOUS; SUBCUTANEOUS at 03:10

## 2024-06-15 RX ADMIN — METHOCARBAMOL 1000 MG: 500 TABLET ORAL at 16:23

## 2024-06-15 RX ADMIN — INSULIN GLARGINE 10 UNITS: 100 INJECTION, SOLUTION SUBCUTANEOUS at 20:02

## 2024-06-15 RX ADMIN — SODIUM CHLORIDE: 9 INJECTION, SOLUTION INTRAVENOUS at 13:11

## 2024-06-15 RX ADMIN — SODIUM CHLORIDE, PRESERVATIVE FREE 10 ML: 5 INJECTION INTRAVENOUS at 20:02

## 2024-06-15 RX ADMIN — INSULIN LISPRO 12 UNITS: 100 INJECTION, SOLUTION INTRAVENOUS; SUBCUTANEOUS at 13:25

## 2024-06-15 RX ADMIN — VERAPAMIL HYDROCHLORIDE 300 MG: 180 TABLET, FILM COATED, EXTENDED RELEASE ORAL at 20:04

## 2024-06-15 RX ADMIN — ATORVASTATIN CALCIUM 20 MG: 20 TABLET, FILM COATED ORAL at 20:02

## 2024-06-15 RX ADMIN — SODIUM CHLORIDE: 9 INJECTION, SOLUTION INTRAVENOUS at 03:09

## 2024-06-15 RX ADMIN — ACETAMINOPHEN 650 MG: 325 TABLET ORAL at 08:42

## 2024-06-15 RX ADMIN — INSULIN LISPRO 4 UNITS: 100 INJECTION, SOLUTION INTRAVENOUS; SUBCUTANEOUS at 23:36

## 2024-06-15 RX ADMIN — INSULIN LISPRO 12 UNITS: 100 INJECTION, SOLUTION INTRAVENOUS; SUBCUTANEOUS at 16:24

## 2024-06-15 RX ADMIN — ACETAMINOPHEN 650 MG: 325 TABLET ORAL at 02:07

## 2024-06-15 RX ADMIN — CALCIUM GLUCONATE 2000 MG: 98 INJECTION, SOLUTION INTRAVENOUS at 09:00

## 2024-06-15 RX ADMIN — OXYCODONE HYDROCHLORIDE 10 MG: 10 TABLET ORAL at 02:07

## 2024-06-15 RX ADMIN — METHOCARBAMOL 1000 MG: 500 TABLET ORAL at 20:02

## 2024-06-15 RX ADMIN — SODIUM CHLORIDE: 9 INJECTION, SOLUTION INTRAVENOUS at 23:34

## 2024-06-15 RX ADMIN — SODIUM CHLORIDE, PRESERVATIVE FREE 10 ML: 5 INJECTION INTRAVENOUS at 08:42

## 2024-06-15 RX ADMIN — Medication 500 MG: at 08:41

## 2024-06-15 ASSESSMENT — PAIN DESCRIPTION - ORIENTATION
ORIENTATION: RIGHT;LOWER
ORIENTATION: RIGHT

## 2024-06-15 ASSESSMENT — PAIN DESCRIPTION - DESCRIPTORS
DESCRIPTORS: DISCOMFORT;BURNING
DESCRIPTORS: BURNING;ACHING;SHOOTING
DESCRIPTORS: ACHING;DISCOMFORT;SORE
DESCRIPTORS: BURNING;SHARP;SHOOTING
DESCRIPTORS: SPASM;SHOOTING;SHARP

## 2024-06-15 ASSESSMENT — PAIN DESCRIPTION - LOCATION
LOCATION: LEG
LOCATION: LEG;KNEE

## 2024-06-15 ASSESSMENT — PAIN - FUNCTIONAL ASSESSMENT
PAIN_FUNCTIONAL_ASSESSMENT: PREVENTS OR INTERFERES SOME ACTIVE ACTIVITIES AND ADLS
PAIN_FUNCTIONAL_ASSESSMENT: PREVENTS OR INTERFERES WITH MANY ACTIVE NOT PASSIVE ACTIVITIES
PAIN_FUNCTIONAL_ASSESSMENT: PREVENTS OR INTERFERES SOME ACTIVE ACTIVITIES AND ADLS
PAIN_FUNCTIONAL_ASSESSMENT: PREVENTS OR INTERFERES SOME ACTIVE ACTIVITIES AND ADLS
PAIN_FUNCTIONAL_ASSESSMENT: PREVENTS OR INTERFERES WITH MANY ACTIVE NOT PASSIVE ACTIVITIES

## 2024-06-15 ASSESSMENT — PAIN SCALES - GENERAL
PAINLEVEL_OUTOF10: 10
PAINLEVEL_OUTOF10: 7
PAINLEVEL_OUTOF10: 4
PAINLEVEL_OUTOF10: 3
PAINLEVEL_OUTOF10: 8
PAINLEVEL_OUTOF10: 3
PAINLEVEL_OUTOF10: 5
PAINLEVEL_OUTOF10: 6

## 2024-06-15 ASSESSMENT — PAIN DESCRIPTION - FREQUENCY
FREQUENCY: CONTINUOUS

## 2024-06-15 ASSESSMENT — PAIN DESCRIPTION - PAIN TYPE
TYPE: ACUTE PAIN;SURGICAL PAIN

## 2024-06-15 ASSESSMENT — PAIN DESCRIPTION - ONSET
ONSET: ON-GOING

## 2024-06-15 NOTE — PLAN OF CARE
Problem: Safety - Adult  Goal: Free from fall injury  6/14/2024 2109 by Leroy Hart RN  Outcome: Progressing  6/14/2024 1036 by Elke Fernando RN  Outcome: Progressing     Problem: Discharge Planning  Goal: Discharge to home or other facility with appropriate resources  6/14/2024 2109 by Leroy Hart RN  Outcome: Progressing  Flowsheets (Taken 6/14/2024 1900)  Discharge to home or other facility with appropriate resources:   Identify barriers to discharge with patient and caregiver   Arrange for needed discharge resources and transportation as appropriate   Identify discharge learning needs (meds, wound care, etc)   Refer to discharge planning if patient needs post-hospital services based on physician order or complex needs related to functional status, cognitive ability or social support system  6/14/2024 1036 by Elke Fernando RN  Outcome: Progressing     Problem: Pain  Goal: Verbalizes/displays adequate comfort level or baseline comfort level  6/14/2024 2109 by Leroy Hart RN  Outcome: Progressing  Flowsheets (Taken 6/14/2024 1900)  Verbalizes/displays adequate comfort level or baseline comfort level:   Encourage patient to monitor pain and request assistance   Assess pain using appropriate pain scale   Administer analgesics based on type and severity of pain and evaluate response   Implement non-pharmacological measures as appropriate and evaluate response  6/14/2024 1036 by Elke Fernando RN  Outcome: Progressing     Problem: Skin/Tissue Integrity  Goal: Absence of new skin breakdown  Description: 1.  Monitor for areas of redness and/or skin breakdown  2.  Assess vascular access sites hourly  3.  Every 4-6 hours minimum:  Change oxygen saturation probe site  4.  Every 4-6 hours:  If on nasal continuous positive airway pressure, respiratory therapy assess nares and determine need for appliance change or resting period.  Outcome: Progressing     Problem: Neurosensory - Adult  Goal: Achieves stable or

## 2024-06-15 NOTE — CONSULTS
Surgical Intensive Care Unit  Daily Progress Note  Date of admission:  6/12/2024  Reason for ICU transfer:  Hyperglycemia     Subjective:  Transferred to SICU overnight for persistent hyperglycemia, CAMRYN     Hospital Course:  6/12: Trauma team.  Injury occurred just prior to arrival. Pt is a older female that presented after a mechanical fall down 15 steps. Found to have RLE deformity. Did hit her head, no LOC. Reports R leg pain. Found to have R radius and R femur fx. Orthopedic surgery consulted  6/13: R radius splinted. GCS 15. Pain managed. Plan for OR with ortho today  6/14: Pain managed s/p OR. Nephrology consulted. Lantus added for glucose control. Transfer to SICU    Physical Exam:  BP (!) 118/49   Pulse 74   Temp 98.1 °F (36.7 °C) (Oral)   Resp 20   Ht 1.651 m (5' 5\")   Wt 80.1 kg (176 lb 9.4 oz)   SpO2 97%   BMI 29.39 kg/m²     General: In no acute distress  Cardiovascular: Warm throughout, no edema  Respiratory: no respiratory distress, equal chest rise  Abdomen: soft,  nontender, nondistended  Skin: no obvious rashes or lesions appreciated, no jaundice  Extremities: ACE wrap present on RLE, knee immobilizer in place, pt is able to dorsiflex and plantar flex, sensation intact. RUE in splint with motor and sensation intact    ASSESSMENT / PLAN:  Neuro: acute pain - multimodal pain control   CV: Hx HTN - home Coreg and verapamil,   Hx HLD  - home statin  Monitor hemodynamics  Pulm: No acute issues -  Monitor RR, SpO; IS   GI:  Ok for diet   Renal: CAMRYN - pre-renal, nephro following recs appreciated - Cr improving this AM. Continue IVF  Hyponatremia - recheck at 10AM   Monitor UOP  Urinary retention - check UA   ID:  No acute issues -  Monitor for SIRS  Endo: hx of DM, hyperglycemia - 10 lantus and ISS Monitor glucose  MSK: R femur fx; R radial fx - s/p ORIF; PTOT     Bowel regime: glycolax   Pain control/Sedation: Tylenol. Gabapentin and robaxin, PRN oxy and dilaudid  DVT prophylaxis: SCDs.  Resume ASA 
nondistended  Resp:   resp easy and unlabored, no audible wheezes note, normal symmetrical expansion of both hemithoraces  Cardiac: distal pulses palpable, skin and extremities well perfused  Neurological: alert, oriented X3, normal speech, no focal findings or movement disorder noted, motor and sensory grossly normal bilaterally, normal muscle tone, no tremors  HEENT: normochephalic atraumatic, external ears and eyes normal, sclera normal, neck supple, no nasal discharge.   Extremities:   peripheral pulses normal, no edema, redness or tenderness in the calves   Skin: normal coloration, no rashes or open wounds, no suspicious skin lesions noted  Psych: Affect euthymic   Musculoskeletal:   Extremity:  Agree with above examination.  Compartment soft compressible.  Wiggles toes.  Gross sensation intact distally.  Palpable DP and PT pulses.    Impression severely comminuted right distal femur fracture around total knee arthroplasty    Plan: Open reduction internal fixation with intramedullary nailing versus combination intramedullary nailing and plating        ELECTRONICALLY signed by:    Nathan Angeles MD  6/13/24   This is been dictated utilizing voice recognition software.  All efforts have been made to make the note accurate although inadvertent errors may be present.    
PRBC  Urine protein/creatinine ratio   Bladder scan to r/o urine retention post rebolledo removal  Repeat BMP this evening   Follow labs  Monitor I&O  Avoid nephrotoxins as able   Low potassium diet  Continue supportive renal care       Thank you for the opportunity to participate in the care of your pleasant patient.  We look forward to following along with you.        Electronically signed by NEWTON Saravia CNP on 6/14/2024 at 1:13 PM

## 2024-06-16 LAB
ANION GAP SERPL CALCULATED.3IONS-SCNC: 12 MMOL/L (ref 7–16)
BASOPHILS # BLD: 0.05 K/UL (ref 0–0.2)
BASOPHILS NFR BLD: 1 % (ref 0–2)
BUN SERPL-MCNC: 51 MG/DL (ref 6–23)
CA-I BLD-SCNC: 1.17 MMOL/L (ref 1.15–1.33)
CALCIUM SERPL-MCNC: 8.1 MG/DL (ref 8.6–10.2)
CHLORIDE SERPL-SCNC: 106 MMOL/L (ref 98–107)
CO2 SERPL-SCNC: 19 MMOL/L (ref 22–29)
CREAT SERPL-MCNC: 1.4 MG/DL (ref 0.5–1)
EOSINOPHIL # BLD: 0.26 K/UL (ref 0.05–0.5)
EOSINOPHILS RELATIVE PERCENT: 3 % (ref 0–6)
ERYTHROCYTE [DISTWIDTH] IN BLOOD BY AUTOMATED COUNT: 13.2 % (ref 11.5–15)
GFR, ESTIMATED: 41 ML/MIN/1.73M2
GLUCOSE BLD-MCNC: 121 MG/DL (ref 74–99)
GLUCOSE BLD-MCNC: 198 MG/DL (ref 74–99)
GLUCOSE BLD-MCNC: 217 MG/DL (ref 74–99)
GLUCOSE BLD-MCNC: 218 MG/DL (ref 74–99)
GLUCOSE BLD-MCNC: 305 MG/DL (ref 74–99)
GLUCOSE BLD-MCNC: 327 MG/DL (ref 74–99)
GLUCOSE SERPL-MCNC: 105 MG/DL (ref 74–99)
HCT VFR BLD AUTO: 25.8 % (ref 34–48)
HGB BLD-MCNC: 8.6 G/DL (ref 11.5–15.5)
IMM GRANULOCYTES # BLD AUTO: 0.05 K/UL (ref 0–0.58)
IMM GRANULOCYTES NFR BLD: 1 % (ref 0–5)
LYMPHOCYTES NFR BLD: 1.33 K/UL (ref 1.5–4)
LYMPHOCYTES RELATIVE PERCENT: 16 % (ref 20–42)
MAGNESIUM SERPL-MCNC: 2.5 MG/DL (ref 1.6–2.6)
MCH RBC QN AUTO: 30.9 PG (ref 26–35)
MCHC RBC AUTO-ENTMCNC: 33.3 G/DL (ref 32–34.5)
MCV RBC AUTO: 92.8 FL (ref 80–99.9)
MICROORGANISM SPEC CULT: NORMAL
MONOCYTES NFR BLD: 0.97 K/UL (ref 0.1–0.95)
MONOCYTES NFR BLD: 12 % (ref 2–12)
NEUTROPHILS NFR BLD: 68 % (ref 43–80)
NEUTS SEG NFR BLD: 5.68 K/UL (ref 1.8–7.3)
PHOSPHATE SERPL-MCNC: 3 MG/DL (ref 2.5–4.5)
PLATELET # BLD AUTO: 207 K/UL (ref 130–450)
PMV BLD AUTO: 10.4 FL (ref 7–12)
POTASSIUM SERPL-SCNC: 4.7 MMOL/L (ref 3.5–5)
RBC # BLD AUTO: 2.78 M/UL (ref 3.5–5.5)
SODIUM SERPL-SCNC: 137 MMOL/L (ref 132–146)
SPECIMEN DESCRIPTION: NORMAL
WBC OTHER # BLD: 8.3 K/UL (ref 4.5–11.5)

## 2024-06-16 PROCEDURE — 6370000000 HC RX 637 (ALT 250 FOR IP): Performed by: SPECIALIST/TECHNOLOGIST

## 2024-06-16 PROCEDURE — 82330 ASSAY OF CALCIUM: CPT

## 2024-06-16 PROCEDURE — 84100 ASSAY OF PHOSPHORUS: CPT

## 2024-06-16 PROCEDURE — 6370000000 HC RX 637 (ALT 250 FOR IP)

## 2024-06-16 PROCEDURE — 1200000000 HC SEMI PRIVATE

## 2024-06-16 PROCEDURE — 2580000003 HC RX 258: Performed by: STUDENT IN AN ORGANIZED HEALTH CARE EDUCATION/TRAINING PROGRAM

## 2024-06-16 PROCEDURE — 83735 ASSAY OF MAGNESIUM: CPT

## 2024-06-16 PROCEDURE — 2580000003 HC RX 258: Performed by: SPECIALIST/TECHNOLOGIST

## 2024-06-16 PROCEDURE — 6370000000 HC RX 637 (ALT 250 FOR IP): Performed by: STUDENT IN AN ORGANIZED HEALTH CARE EDUCATION/TRAINING PROGRAM

## 2024-06-16 PROCEDURE — 99291 CRITICAL CARE FIRST HOUR: CPT | Performed by: STUDENT IN AN ORGANIZED HEALTH CARE EDUCATION/TRAINING PROGRAM

## 2024-06-16 PROCEDURE — 80048 BASIC METABOLIC PNL TOTAL CA: CPT

## 2024-06-16 PROCEDURE — 6370000000 HC RX 637 (ALT 250 FOR IP): Performed by: PHYSICAL THERAPY ASSISTANT

## 2024-06-16 PROCEDURE — 85025 COMPLETE CBC W/AUTO DIFF WBC: CPT

## 2024-06-16 PROCEDURE — 82962 GLUCOSE BLOOD TEST: CPT

## 2024-06-16 RX ADMIN — INSULIN LISPRO 16 UNITS: 100 INJECTION, SOLUTION INTRAVENOUS; SUBCUTANEOUS at 20:00

## 2024-06-16 RX ADMIN — Medication 50 MG: at 09:01

## 2024-06-16 RX ADMIN — INSULIN LISPRO 8 UNITS: 100 INJECTION, SOLUTION INTRAVENOUS; SUBCUTANEOUS at 11:54

## 2024-06-16 RX ADMIN — VERAPAMIL HYDROCHLORIDE 300 MG: 180 TABLET, FILM COATED, EXTENDED RELEASE ORAL at 22:04

## 2024-06-16 RX ADMIN — OXYCODONE 5 MG: 5 TABLET ORAL at 09:11

## 2024-06-16 RX ADMIN — CARVEDILOL 6.25 MG: 6.25 TABLET, FILM COATED ORAL at 16:42

## 2024-06-16 RX ADMIN — POLYETHYLENE GLYCOL 3350 17 G: 17 POWDER, FOR SOLUTION ORAL at 11:53

## 2024-06-16 RX ADMIN — ACETAMINOPHEN 650 MG: 325 TABLET ORAL at 11:54

## 2024-06-16 RX ADMIN — ASPIRIN 325 MG: 325 TABLET, COATED ORAL at 09:01

## 2024-06-16 RX ADMIN — GABAPENTIN 300 MG: 300 CAPSULE ORAL at 08:58

## 2024-06-16 RX ADMIN — INSULIN GLARGINE 10 UNITS: 100 INJECTION, SOLUTION SUBCUTANEOUS at 22:06

## 2024-06-16 RX ADMIN — METHOCARBAMOL 1000 MG: 500 TABLET ORAL at 11:54

## 2024-06-16 RX ADMIN — INSULIN LISPRO 8 UNITS: 100 INJECTION, SOLUTION INTRAVENOUS; SUBCUTANEOUS at 14:53

## 2024-06-16 RX ADMIN — SODIUM CHLORIDE: 9 INJECTION, SOLUTION INTRAVENOUS at 09:09

## 2024-06-16 RX ADMIN — ACETAMINOPHEN 650 MG: 325 TABLET ORAL at 08:58

## 2024-06-16 RX ADMIN — ATORVASTATIN CALCIUM 20 MG: 20 TABLET, FILM COATED ORAL at 21:45

## 2024-06-16 RX ADMIN — SODIUM CHLORIDE, PRESERVATIVE FREE 10 ML: 5 INJECTION INTRAVENOUS at 21:00

## 2024-06-16 RX ADMIN — OXYCODONE 5 MG: 5 TABLET ORAL at 04:05

## 2024-06-16 RX ADMIN — ALPRAZOLAM 1 MG: 1 TABLET ORAL at 22:03

## 2024-06-16 RX ADMIN — METHOCARBAMOL 1000 MG: 500 TABLET ORAL at 16:41

## 2024-06-16 RX ADMIN — METHOCARBAMOL 1000 MG: 500 TABLET ORAL at 21:44

## 2024-06-16 RX ADMIN — Medication 500 MG: at 08:58

## 2024-06-16 RX ADMIN — ACETAMINOPHEN 650 MG: 325 TABLET ORAL at 21:44

## 2024-06-16 RX ADMIN — SODIUM CHLORIDE: 9 INJECTION, SOLUTION INTRAVENOUS at 17:59

## 2024-06-16 RX ADMIN — SODIUM CHLORIDE, PRESERVATIVE FREE 10 ML: 5 INJECTION INTRAVENOUS at 09:02

## 2024-06-16 RX ADMIN — ASPIRIN 325 MG: 325 TABLET, COATED ORAL at 22:04

## 2024-06-16 RX ADMIN — METHOCARBAMOL 1000 MG: 500 TABLET ORAL at 08:57

## 2024-06-16 RX ADMIN — GABAPENTIN 300 MG: 300 CAPSULE ORAL at 21:44

## 2024-06-16 RX ADMIN — CARVEDILOL 6.25 MG: 6.25 TABLET, FILM COATED ORAL at 08:57

## 2024-06-16 RX ADMIN — INSULIN LISPRO 4 UNITS: 100 INJECTION, SOLUTION INTRAVENOUS; SUBCUTANEOUS at 08:59

## 2024-06-16 ASSESSMENT — PAIN DESCRIPTION - PAIN TYPE
TYPE: ACUTE PAIN;SURGICAL PAIN
TYPE: ACUTE PAIN;SURGICAL PAIN

## 2024-06-16 ASSESSMENT — PAIN SCALES - GENERAL
PAINLEVEL_OUTOF10: 3
PAINLEVEL_OUTOF10: 4
PAINLEVEL_OUTOF10: 2
PAINLEVEL_OUTOF10: 4

## 2024-06-16 ASSESSMENT — PAIN DESCRIPTION - DESCRIPTORS
DESCRIPTORS: ACHING;BURNING;SPASM
DESCRIPTORS: ACHING;SHARP
DESCRIPTORS: DISCOMFORT;ACHING

## 2024-06-16 ASSESSMENT — PAIN DESCRIPTION - FREQUENCY
FREQUENCY: CONTINUOUS
FREQUENCY: CONTINUOUS

## 2024-06-16 ASSESSMENT — PAIN DESCRIPTION - ORIENTATION
ORIENTATION: UPPER
ORIENTATION: RIGHT
ORIENTATION: RIGHT

## 2024-06-16 ASSESSMENT — PAIN DESCRIPTION - ONSET
ONSET: ON-GOING
ONSET: ON-GOING

## 2024-06-16 ASSESSMENT — PAIN DESCRIPTION - LOCATION
LOCATION: LEG
LOCATION: LEG
LOCATION: KNEE;LEG

## 2024-06-16 ASSESSMENT — PAIN SCALES - WONG BAKER
WONGBAKER_NUMERICALRESPONSE: NO HURT
WONGBAKER_NUMERICALRESPONSE: NO HURT

## 2024-06-16 NOTE — PLAN OF CARE
Problem: Discharge Planning  Goal: Discharge to home or other facility with appropriate resources  6/16/2024 1013 by Sruthi Rico, RN  Outcome: Progressing  Flowsheets (Taken 6/16/2024 0400 by Leroy Hart RN)  Discharge to home or other facility with appropriate resources:   Identify barriers to discharge with patient and caregiver   Arrange for needed discharge resources and transportation as appropriate   Identify discharge learning needs (meds, wound care, etc)   Refer to discharge planning if patient needs post-hospital services based on physician order or complex needs related to functional status, cognitive ability or social support system     Problem: Pain  Goal: Verbalizes/displays adequate comfort level or baseline comfort level  6/16/2024 1013 by Sruthi Rico, RN  Outcome: Progressing     Problem: Skin/Tissue Integrity  Goal: Absence of new skin breakdown  Description: 1.  Monitor for areas of redness and/or skin breakdown  2.  Assess vascular access sites hourly  3.  Every 4-6 hours minimum:  Change oxygen saturation probe site  4.  Every 4-6 hours:  If on nasal continuous positive airway pressure, respiratory therapy assess nares and determine need for appliance change or resting period.  6/16/2024 1013 by Sruthi Rico, RN  Outcome: Progressing

## 2024-06-16 NOTE — PLAN OF CARE
Problem: Discharge Planning  Goal: Discharge to home or other facility with appropriate resources  6/16/2024 1900 by Neftali Roblero RN  Outcome: Progressing  6/16/2024 1013 by Sruthi Rico RN  Outcome: Progressing  Flowsheets (Taken 6/16/2024 0400 by Leroy Hart RN)  Discharge to home or other facility with appropriate resources:   Identify barriers to discharge with patient and caregiver   Arrange for needed discharge resources and transportation as appropriate   Identify discharge learning needs (meds, wound care, etc)   Refer to discharge planning if patient needs post-hospital services based on physician order or complex needs related to functional status, cognitive ability or social support system     Problem: Pain  Goal: Verbalizes/displays adequate comfort level or baseline comfort level  6/16/2024 1900 by Neftali Roblero RN  Outcome: Progressing  6/16/2024 1013 by Sruthi Rico RN  Outcome: Progressing     Problem: Neurosensory - Adult  Goal: Achieves stable or improved neurological status  6/16/2024 1900 by Neftali Roblero RN  Outcome: Progressing  6/16/2024 1013 by Sruthi Rico RN  Outcome: Progressing  Flowsheets (Taken 6/16/2024 0400 by Leroy Hart RN)  Achieves stable or improved neurological status:   Assess for and report changes in neurological status   Initiate measures to prevent increased intracranial pressure   Maintain blood pressure and fluid volume within ordered parameters to optimize cerebral perfusion and minimize risk of hemorrhage   Monitor temperature, glucose, and sodium. Initiate appropriate interventions as ordered  Goal: Achieves maximal functionality and self care  Outcome: Progressing     Problem: Cardiovascular - Adult  Goal: Maintains optimal cardiac output and hemodynamic stability  6/16/2024 1900 by Neftali Roblero RN  Outcome: Progressing  6/16/2024 1013 by Sruthi Rico RN  Outcome: Progressing  Flowsheets (Taken 6/16/2024

## 2024-06-16 NOTE — SUBSTANCE USE DISORDER
4 Eyes Skin Assessment     NAME:  Sandhya Adame  YOB: 1955  MEDICAL RECORD NUMBER:  [id number]    The patient is being assessed for  Transfer to New Unit    I agree that at least one RN has performed a thorough Head to Toe Skin Assessment on the patient. ALL assessment sites listed below have been assessed.      Areas assessed by both nurses:    Head, Face, Ears, Shoulders, Back, Chest, Arms, Elbows, Hands, Sacrum. Buttock, Coccyx, Ischium, Legs. Feet and Heels, and Under Medical Devices         Does the Patient have a Wound? No noted wound(s)       Graham Prevention initiated by RN: Yes  Wound Care Orders initiated by RN: No    Pressure Injury (Stage 3,4, Unstageable, DTI, NWPT, and Complex wounds) if present, place Wound referral order by RN under : No    New Ostomies, if present place, Ostomy referral order under : No     Nurse 1 eSignature: Electronically signed by Neftali Steele RN on 6/16/24 at 7:13 PM EDT    **SHARE this note so that the co-signing nurse can place an eSignature**    Nurse 2 eSignature: Simona Villa RN

## 2024-06-17 VITALS
RESPIRATION RATE: 18 BRPM | HEART RATE: 81 BPM | BODY MASS INDEX: 29.42 KG/M2 | SYSTOLIC BLOOD PRESSURE: 154 MMHG | HEIGHT: 65 IN | TEMPERATURE: 98.2 F | DIASTOLIC BLOOD PRESSURE: 61 MMHG | OXYGEN SATURATION: 99 % | WEIGHT: 176.59 LBS

## 2024-06-17 LAB
ANION GAP SERPL CALCULATED.3IONS-SCNC: 7 MMOL/L (ref 7–16)
BASOPHILS # BLD: 0.05 K/UL (ref 0–0.2)
BASOPHILS NFR BLD: 1 % (ref 0–2)
BUN SERPL-MCNC: 31 MG/DL (ref 6–23)
CA-I BLD-SCNC: 1.21 MMOL/L (ref 1.15–1.33)
CALCIUM SERPL-MCNC: 7.8 MG/DL (ref 8.6–10.2)
CHLORIDE SERPL-SCNC: 113 MMOL/L (ref 98–107)
CO2 SERPL-SCNC: 21 MMOL/L (ref 22–29)
CREAT SERPL-MCNC: 1 MG/DL (ref 0.5–1)
EOSINOPHIL # BLD: 0.46 K/UL (ref 0.05–0.5)
EOSINOPHILS RELATIVE PERCENT: 7 % (ref 0–6)
ERYTHROCYTE [DISTWIDTH] IN BLOOD BY AUTOMATED COUNT: 13.7 % (ref 11.5–15)
GFR, ESTIMATED: 63 ML/MIN/1.73M2
GLUCOSE BLD-MCNC: 108 MG/DL (ref 74–99)
GLUCOSE BLD-MCNC: 185 MG/DL (ref 74–99)
GLUCOSE BLD-MCNC: 195 MG/DL (ref 74–99)
GLUCOSE BLD-MCNC: 225 MG/DL (ref 74–99)
GLUCOSE BLD-MCNC: 331 MG/DL (ref 74–99)
GLUCOSE BLD-MCNC: 57 MG/DL (ref 74–99)
GLUCOSE BLD-MCNC: 90 MG/DL (ref 74–99)
GLUCOSE SERPL-MCNC: 49 MG/DL (ref 74–99)
HCT VFR BLD AUTO: 25 % (ref 34–48)
HGB BLD-MCNC: 8.3 G/DL (ref 11.5–15.5)
IMM GRANULOCYTES # BLD AUTO: 0.03 K/UL (ref 0–0.58)
IMM GRANULOCYTES NFR BLD: 1 % (ref 0–5)
LYMPHOCYTES NFR BLD: 1.27 K/UL (ref 1.5–4)
LYMPHOCYTES RELATIVE PERCENT: 19 % (ref 20–42)
MCH RBC QN AUTO: 31.2 PG (ref 26–35)
MCHC RBC AUTO-ENTMCNC: 33.2 G/DL (ref 32–34.5)
MCV RBC AUTO: 94 FL (ref 80–99.9)
MONOCYTES NFR BLD: 0.76 K/UL (ref 0.1–0.95)
MONOCYTES NFR BLD: 12 % (ref 2–12)
NEUTROPHILS NFR BLD: 61 % (ref 43–80)
NEUTS SEG NFR BLD: 4.06 K/UL (ref 1.8–7.3)
PLATELET # BLD AUTO: 213 K/UL (ref 130–450)
PMV BLD AUTO: 10 FL (ref 7–12)
POTASSIUM SERPL-SCNC: 4.2 MMOL/L (ref 3.5–5)
RBC # BLD AUTO: 2.66 M/UL (ref 3.5–5.5)
SODIUM SERPL-SCNC: 141 MMOL/L (ref 132–146)
WBC OTHER # BLD: 6.6 K/UL (ref 4.5–11.5)

## 2024-06-17 PROCEDURE — 6370000000 HC RX 637 (ALT 250 FOR IP): Performed by: STUDENT IN AN ORGANIZED HEALTH CARE EDUCATION/TRAINING PROGRAM

## 2024-06-17 PROCEDURE — 82962 GLUCOSE BLOOD TEST: CPT

## 2024-06-17 PROCEDURE — 2580000003 HC RX 258: Performed by: STUDENT IN AN ORGANIZED HEALTH CARE EDUCATION/TRAINING PROGRAM

## 2024-06-17 PROCEDURE — 6370000000 HC RX 637 (ALT 250 FOR IP): Performed by: PHYSICAL THERAPY ASSISTANT

## 2024-06-17 PROCEDURE — 6370000000 HC RX 637 (ALT 250 FOR IP)

## 2024-06-17 PROCEDURE — 36415 COLL VENOUS BLD VENIPUNCTURE: CPT

## 2024-06-17 PROCEDURE — 97530 THERAPEUTIC ACTIVITIES: CPT

## 2024-06-17 PROCEDURE — 80048 BASIC METABOLIC PNL TOTAL CA: CPT

## 2024-06-17 PROCEDURE — 6370000000 HC RX 637 (ALT 250 FOR IP): Performed by: SPECIALIST/TECHNOLOGIST

## 2024-06-17 PROCEDURE — 97535 SELF CARE MNGMENT TRAINING: CPT

## 2024-06-17 PROCEDURE — 82330 ASSAY OF CALCIUM: CPT

## 2024-06-17 PROCEDURE — 85025 COMPLETE CBC W/AUTO DIFF WBC: CPT

## 2024-06-17 PROCEDURE — 99232 SBSQ HOSP IP/OBS MODERATE 35: CPT | Performed by: SURGERY

## 2024-06-17 RX ORDER — GABAPENTIN 400 MG/1
400 CAPSULE ORAL 2 TIMES DAILY
Status: DISCONTINUED | OUTPATIENT
Start: 2024-06-17 | End: 2024-06-17 | Stop reason: HOSPADM

## 2024-06-17 RX ORDER — BISACODYL 10 MG
10 SUPPOSITORY, RECTAL RECTAL DAILY
Status: DISCONTINUED | OUTPATIENT
Start: 2024-06-17 | End: 2024-06-17 | Stop reason: HOSPADM

## 2024-06-17 RX ORDER — INSULIN GLARGINE 100 [IU]/ML
5 INJECTION, SOLUTION SUBCUTANEOUS 2 TIMES DAILY
Status: DISCONTINUED | OUTPATIENT
Start: 2024-06-17 | End: 2024-06-17

## 2024-06-17 RX ORDER — CARVEDILOL 6.25 MG/1
6.25 TABLET ORAL 2 TIMES DAILY WITH MEALS
Qty: 60 TABLET | Refills: 3 | DISCHARGE
Start: 2024-06-17

## 2024-06-17 RX ORDER — INSULIN GLARGINE 100 [IU]/ML
5 INJECTION, SOLUTION SUBCUTANEOUS 2 TIMES DAILY
Qty: 10 ML | Refills: 3 | DISCHARGE
Start: 2024-06-17

## 2024-06-17 RX ORDER — DIAZEPAM 5 MG/1
5 TABLET ORAL ONCE
Status: COMPLETED | OUTPATIENT
Start: 2024-06-17 | End: 2024-06-17

## 2024-06-17 RX ORDER — GLUCAGON 1 MG/ML
1 KIT INJECTION PRN
Qty: 1 EACH | DISCHARGE
Start: 2024-06-17

## 2024-06-17 RX ORDER — SENNA AND DOCUSATE SODIUM 50; 8.6 MG/1; MG/1
2 TABLET, FILM COATED ORAL DAILY
DISCHARGE
Start: 2024-06-18

## 2024-06-17 RX ORDER — BISACODYL 10 MG
10 SUPPOSITORY, RECTAL RECTAL DAILY
Qty: 30 SUPPOSITORY | Refills: 0 | DISCHARGE
Start: 2024-06-18 | End: 2024-07-18

## 2024-06-17 RX ORDER — INSULIN LISPRO 100 [IU]/ML
0-16 INJECTION, SOLUTION INTRAVENOUS; SUBCUTANEOUS EVERY 4 HOURS
DISCHARGE
Start: 2024-06-17

## 2024-06-17 RX ORDER — SENNA AND DOCUSATE SODIUM 50; 8.6 MG/1; MG/1
2 TABLET, FILM COATED ORAL DAILY
Status: DISCONTINUED | OUTPATIENT
Start: 2024-06-17 | End: 2024-06-17 | Stop reason: HOSPADM

## 2024-06-17 RX ORDER — ONDANSETRON 4 MG/1
4 TABLET, ORALLY DISINTEGRATING ORAL EVERY 8 HOURS PRN
DISCHARGE
Start: 2024-06-17

## 2024-06-17 RX ORDER — METHOCARBAMOL 1000 MG/1
1000 TABLET, COATED ORAL 4 TIMES DAILY
DISCHARGE
Start: 2024-06-17 | End: 2024-06-27

## 2024-06-17 RX ORDER — ATORVASTATIN CALCIUM 20 MG/1
20 TABLET, FILM COATED ORAL NIGHTLY
Qty: 30 TABLET | Refills: 3 | DISCHARGE
Start: 2024-06-17

## 2024-06-17 RX ORDER — INSULIN GLARGINE 100 [IU]/ML
5 INJECTION, SOLUTION SUBCUTANEOUS 2 TIMES DAILY
Status: DISCONTINUED | OUTPATIENT
Start: 2024-06-17 | End: 2024-06-17 | Stop reason: HOSPADM

## 2024-06-17 RX ORDER — OXYCODONE HYDROCHLORIDE 5 MG/1
5 TABLET ORAL EVERY 4 HOURS PRN
Status: DISCONTINUED | OUTPATIENT
Start: 2024-06-17 | End: 2024-06-17 | Stop reason: HOSPADM

## 2024-06-17 RX ORDER — POLYETHYLENE GLYCOL 3350 17 G/17G
17 POWDER, FOR SOLUTION ORAL DAILY
Qty: 527 G | Refills: 0 | DISCHARGE
Start: 2024-06-18 | End: 2024-07-19

## 2024-06-17 RX ORDER — GABAPENTIN 400 MG/1
400 CAPSULE ORAL 2 TIMES DAILY
Qty: 20 CAPSULE | Refills: 0 | DISCHARGE
Start: 2024-06-17 | End: 2024-06-27

## 2024-06-17 RX ADMIN — INSULIN LISPRO 16 UNITS: 100 INJECTION, SOLUTION INTRAVENOUS; SUBCUTANEOUS at 11:59

## 2024-06-17 RX ADMIN — GABAPENTIN 400 MG: 400 CAPSULE ORAL at 08:41

## 2024-06-17 RX ADMIN — CARVEDILOL 6.25 MG: 6.25 TABLET, FILM COATED ORAL at 16:29

## 2024-06-17 RX ADMIN — METHOCARBAMOL 1000 MG: 500 TABLET ORAL at 11:58

## 2024-06-17 RX ADMIN — METHOCARBAMOL 1000 MG: 500 TABLET ORAL at 08:36

## 2024-06-17 RX ADMIN — OXYCODONE 5 MG: 5 TABLET ORAL at 09:41

## 2024-06-17 RX ADMIN — Medication 50 MG: at 09:41

## 2024-06-17 RX ADMIN — SODIUM CHLORIDE, PRESERVATIVE FREE 10 ML: 5 INJECTION INTRAVENOUS at 11:59

## 2024-06-17 RX ADMIN — OXYCODONE 5 MG: 5 TABLET ORAL at 16:30

## 2024-06-17 RX ADMIN — Medication 500 MG: at 08:43

## 2024-06-17 RX ADMIN — INSULIN LISPRO 4 UNITS: 100 INJECTION, SOLUTION INTRAVENOUS; SUBCUTANEOUS at 16:28

## 2024-06-17 RX ADMIN — INSULIN LISPRO 4 UNITS: 100 INJECTION, SOLUTION INTRAVENOUS; SUBCUTANEOUS at 00:39

## 2024-06-17 RX ADMIN — ASPIRIN 325 MG: 325 TABLET, COATED ORAL at 08:43

## 2024-06-17 RX ADMIN — METHOCARBAMOL 1000 MG: 500 TABLET ORAL at 16:29

## 2024-06-17 RX ADMIN — INSULIN GLARGINE 5 UNITS: 100 INJECTION, SOLUTION SUBCUTANEOUS at 12:41

## 2024-06-17 RX ADMIN — SENNOSIDES AND DOCUSATE SODIUM 2 TABLET: 50; 8.6 TABLET ORAL at 08:41

## 2024-06-17 RX ADMIN — POLYETHYLENE GLYCOL 3350 17 G: 17 POWDER, FOR SOLUTION ORAL at 08:36

## 2024-06-17 RX ADMIN — OXYCODONE 5 MG: 5 TABLET ORAL at 05:37

## 2024-06-17 RX ADMIN — DIAZEPAM 5 MG: 5 TABLET ORAL at 08:40

## 2024-06-17 RX ADMIN — Medication 16 G: at 05:39

## 2024-06-17 ASSESSMENT — PAIN SCALES - GENERAL
PAINLEVEL_OUTOF10: 4
PAINLEVEL_OUTOF10: 6
PAINLEVEL_OUTOF10: 0
PAINLEVEL_OUTOF10: 6
PAINLEVEL_OUTOF10: 7
PAINLEVEL_OUTOF10: 6
PAINLEVEL_OUTOF10: 5
PAINLEVEL_OUTOF10: 4

## 2024-06-17 ASSESSMENT — PAIN DESCRIPTION - LOCATION
LOCATION: LEG
LOCATION: LEG;KNEE
LOCATION: LEG
LOCATION: KNEE;LEG
LOCATION: LEG;KNEE
LOCATION: KNEE;GROIN;LEG

## 2024-06-17 ASSESSMENT — PAIN DESCRIPTION - ORIENTATION
ORIENTATION: RIGHT
ORIENTATION: RIGHT;MID
ORIENTATION: RIGHT;MID
ORIENTATION: RIGHT

## 2024-06-17 ASSESSMENT — PAIN - FUNCTIONAL ASSESSMENT
PAIN_FUNCTIONAL_ASSESSMENT: PREVENTS OR INTERFERES SOME ACTIVE ACTIVITIES AND ADLS

## 2024-06-17 ASSESSMENT — PAIN SCALES - WONG BAKER: WONGBAKER_NUMERICALRESPONSE: NO HURT

## 2024-06-17 ASSESSMENT — PAIN DESCRIPTION - DESCRIPTORS
DESCRIPTORS: ACHING;CRAMPING;SHOOTING
DESCRIPTORS: ACHING;CRAMPING;SHOOTING
DESCRIPTORS: SHARP;SPASM
DESCRIPTORS: BURNING;THROBBING;STABBING

## 2024-06-17 ASSESSMENT — PAIN DESCRIPTION - FREQUENCY: FREQUENCY: CONTINUOUS

## 2024-06-17 NOTE — DISCHARGE INSTR - DIET

## 2024-06-17 NOTE — PLAN OF CARE
Problem: Discharge Planning  Goal: Discharge to home or other facility with appropriate resources  6/16/2024 2340 by Simona Weaver RN  Outcome: Progressing  Flowsheets (Taken 6/16/2024 2030)  Discharge to home or other facility with appropriate resources: Identify barriers to discharge with patient and caregiver  6/16/2024 1900 by Neftali Roblero RN  Outcome: Progressing  6/16/2024 1013 by Sruthi Rico RN  Outcome: Progressing  Flowsheets (Taken 6/16/2024 0400 by Leroy Hart RN)  Discharge to home or other facility with appropriate resources:   Identify barriers to discharge with patient and caregiver   Arrange for needed discharge resources and transportation as appropriate   Identify discharge learning needs (meds, wound care, etc)   Refer to discharge planning if patient needs post-hospital services based on physician order or complex needs related to functional status, cognitive ability or social support system     Problem: Pain  Goal: Verbalizes/displays adequate comfort level or baseline comfort level  6/16/2024 2340 by Simona Weaver RN  Outcome: Progressing  6/16/2024 1900 by Neftali Roblero RN  Outcome: Progressing  6/16/2024 1013 by Sruthi Rico RN  Outcome: Progressing     Problem: Neurosensory - Adult  Goal: Achieves stable or improved neurological status  6/16/2024 2340 by Simona Weaver RN  Outcome: Progressing  Flowsheets (Taken 6/16/2024 2030)  Achieves stable or improved neurological status: Assess for and report changes in neurological status  6/16/2024 1900 by Neftali Roblero RN  Outcome: Progressing  6/16/2024 1013 by Sruthi Rico RN  Outcome: Progressing  Flowsheets (Taken 6/16/2024 0400 by Leroy Hart RN)  Achieves stable or improved neurological status:   Assess for and report changes in neurological status   Initiate measures to prevent increased intracranial pressure   Maintain blood pressure and fluid volume within ordered parameters to

## 2024-06-17 NOTE — CARE COORDINATION
06/17/24 Update CM Note: Elizabeth is pending precert for discharge to [facility]. She is now on general medical floor. She is still c/o leg pain with minimal relief from medications. She is now on room air. She was started on valium due to her anxiety and is feeling better she states. Will follow for precert. Contact made with Gita and she will check with insurance. Electronically signed by Tara Espinoza RN CM on 6/17/2024 at 11:45 AM

## 2024-06-17 NOTE — CARE COORDINATION
06/17/24 Update CM Note: Sallytent to be discharged to [facility] today with [facility] picking up at 5:00pm via stretcher. Paperwork completed. Bedside nurse and admissions liaison notified. Electronically signed by Tara Espinoza RN CM on 6/17/2024 at 3:25 PM

## 2024-06-17 NOTE — PROGRESS NOTES
Date: 2024       Patient Name: Sandhya Adame  : 1955      MRN: [id number]    PT order received. Chart has been reviewed. PT evaluation will be on hold due to planned OR this date 24. Will continue to follow and complete evaluation at later time.     Akshat Berg, PT     
4 Eyes Skin Assessment     NAME:  Sandhya Adame  YOB: 1955  MEDICAL RECORD NUMBER:  [id number]    The patient is being assessed for  Admission    I agree that at least one RN has performed a thorough Head to Toe Skin Assessment on the patient. ALL assessment sites listed below have been assessed.      Areas assessed by both nurses:    Head, Face, Ears, Shoulders, Back, Chest, Arms, Elbows, Hands, Sacrum. Buttock, Coccyx, Ischium, and Legs. Feet and Heels        Does the Patient have a Wound? No noted wound(s)       Graham Prevention initiated by RN: Yes  Wound Care Orders initiated by RN: No    Pressure Injury (Stage 3,4, Unstageable, DTI, NWPT, and Complex wounds) if present, place Wound referral order by RN under : No    New Ostomies, if present place, Ostomy referral order under : No     Nurse 1 eSignature: Electronically signed by Jaye Snow RN on 6/13/24 at 3:30 PM EDT    **SHARE this note so that the co-signing nurse can place an eSignature**    Nurse 2 eSignature: {Esignature:[id number]}  
4 Eyes Skin Assessment     NAME:  Sandhya Adame  YOB: 1955  MEDICAL RECORD NUMBER:  [id number]    The patient is being assessed for  Transfer to New Unit    I agree that at least one RN has performed a thorough Head to Toe Skin Assessment on the patient. ALL assessment sites listed below have been assessed.      Areas assessed by both nurses:    Head, Face, Ears, Shoulders, Back, Chest, Arms, Elbows, Hands, Sacrum. Buttock, Coccyx, Ischium, Legs. Feet and Heels, and Under Medical Devices         Does the Patient have a Wound? Yes wound(s) were present on assessment. LDA wound assessment was Initiated and completed by RN    Incision LLE  Ecchymosis BUE  Blanchable redness to buttocks       Graham Prevention initiated by RN: Yes  Wound Care Orders initiated by RN: No    Pressure Injury (Stage 3,4, Unstageable, DTI, NWPT, and Complex wounds) if present, place Wound referral order by RN under : No    New Ostomies, if present place, Ostomy referral order under : No     Nurse 1 eSignature: Electronically signed by Leroy Hart RN on 6/14/24 at 7:34 PM EDT    **SHARE this note so that the co-signing nurse can place an eSignature**    Nurse 2 eSignature: Electronically signed by Thais Mejía on 6/14/24 at 11:23 PM EDT   
[facility]  MD Edgardo Stevenson, MD Erum Allred, CNP   July Castillo, ZAK Smith, MILE  Progress Note    6/17/2024    SUBJECTIVE:   6/15: Seen this early afternoon.  Feeling better.  BP improved.  Pain controlled.  No swelling.  (-) sob/lay/cp/palp Appetite ok    6/17: Laying in bed. Complains of muscle spasms to her right leg. Not happy with her dietary trays, though intake is decent. She denies dyspnea, chest pain, or palpitations. Urine output is excellent.     PROBLEM LIST:    Principal Problem:    Fall down stairs, initial encounter  Active Problems:    Fall    Hyperglycemia    Hyponatremia  Resolved Problems:    * No resolved hospital problems. *         DIET:    ADULT DIET; Regular; 3 carb choices (45 gm/meal)     MEDS (scheduled):    sennosides-docusate sodium  2 tablet Oral Daily    bisacodyl  10 mg Rectal Daily    gabapentin  400 mg Oral BID    insulin glargine  5 Units SubCUTAneous BID    atorvastatin  20 mg Oral Nightly    carvedilol  6.25 mg Oral BID WC    verapamil  300 mg Oral Nightly    insulin lispro  0-16 Units SubCUTAneous Q4H    aspirin  325 mg Oral BID    vitamin C  500 mg Oral Daily    zinc sulfate  50 mg Oral Daily    vitamin D  50,000 Units Oral Weekly    sodium chloride flush  10 mL IntraVENous 2 times per day    methocarbamol  1,000 mg Oral 4x Daily    polyethylene glycol  17 g Oral Daily    acetaminophen  650 mg Oral Q6H       MEDS (infusions):   sodium chloride      sodium chloride      dextrose      sodium chloride 100 mL/hr at 06/16/24 1759    sodium chloride         MEDS (prn):  oxyCODONE **OR** [DISCONTINUED] oxyCODONE, sodium chloride, sodium chloride, ALPRAZolam, glucose, dextrose bolus **OR** dextrose bolus, glucagon (rDNA), dextrose, sodium chloride flush, sodium chloride, ondansetron **OR** ondansetron    PHYSICAL EXAM:     Patient Vitals for the past 24 hrs:   BP Temp Temp src Pulse Resp SpO2   06/17/24 1136 
[facility]  MD Edgardo Stevenson, MD Rosita Brooks, MD Erum Calderon, CNP   July Castillo, ANP  Meri Smith, CNP  Progress Note    6/15/2024    SUBJECTIVE:   6/15: Seen this early afternoon.  Feeling better.  BP improved.  Pain controlled.  No swelling.  (-) sob/lay/cp/palp Appetite ok    PROBLEM LIST:    Principal Problem:    Fall down stairs, initial encounter  Active Problems:    Fall    Hyperglycemia    Hyponatremia  Resolved Problems:    * No resolved hospital problems. *         DIET:    ADULT DIET; Regular; 4 carb choices (60 gm/meal); Low Potassium (Less than 3000 mg/day)     MEDS (scheduled):    insulin glargine  10 Units SubCUTAneous Nightly    atorvastatin  20 mg Oral Nightly    carvedilol  6.25 mg Oral BID WC    gabapentin  300 mg Oral BID    verapamil  300 mg Oral Nightly    insulin lispro  0-16 Units SubCUTAneous Q4H    [Held by provider] aspirin  325 mg Oral BID    vitamin C  500 mg Oral Daily    zinc sulfate  50 mg Oral Daily    vitamin D  50,000 Units Oral Weekly    sodium chloride flush  10 mL IntraVENous 2 times per day    methocarbamol  1,000 mg Oral 4x Daily    polyethylene glycol  17 g Oral Daily    acetaminophen  650 mg Oral Q6H       MEDS (infusions):   sodium chloride      sodium chloride      dextrose      sodium chloride 100 mL/hr at 06/15/24 1329    sodium chloride         MEDS (prn):  sodium chloride, sodium chloride, ALPRAZolam, glucose, dextrose bolus **OR** dextrose bolus, glucagon (rDNA), dextrose, sodium chloride flush, sodium chloride, ondansetron **OR** ondansetron, oxyCODONE **OR** oxyCODONE, HYDROmorphone    PHYSICAL EXAM:     Patient Vitals for the past 24 hrs:   BP Temp Temp src Pulse Resp SpO2 Weight   06/15/24 1623 (!) 122/47 -- -- 74 -- -- --   06/15/24 1500 (!) 126/51 -- -- 74 21 95 % --   06/15/24 1400 (!) 134/55 98.1 °F (36.7 °C) Oral 74 10 95 % --   06/15/24 1300 (!) 123/50 -- -- 74 14 96 % --   06/15/24 1200 124/61 98.4 °F 
[facility].    Daily Progress Note 6/17/2024    Date of admission:  6/12/2024    CC: mechanical fall down 15 steps    Subjective:  Transferred out of ICU. 3.3 L UOP. AM Cr normalized. Having RLE muscle spasms. Hyperglycemic last night, given 10 Lantus, became hypoglycemic this AM.    Objective:  BP (!) 116/50   Pulse 70   Temp 97.2 °F (36.2 °C) (Tympanic)   Resp 18   Ht 1.651 m (5' 5\")   Wt 80.1 kg (176 lb 9.4 oz)   SpO2 100%   BMI 29.39 kg/m²     GENERAL:  Laying in bed, awake, alert, cooperative, no apparent distress    NEUROLOGIC:      HEAD: Normocephalic, atraumatic  EYES: No sclera icterus, pupils equal  LUNGS:  No increased work of breathing.  room air.  CARDIOVASCULAR:  regular rate, normotensive.   ABDOMEN:  Soft, non-tender, non-distended  EXTREMITIES: No edema or swelling. RLE wrapped and in splint. RUE wrapped  SKIN: Warm and dry    Assessment/Plan:  68 y.o. female s/p mechanical fall down 15 steps with R radius fx and  R femur fx    Principal Problem:    Fall down stairs, initial encounter  Active Problems:    Fall    Hyperglycemia    Hyponatremia  Resolved Problems:    * No resolved hospital problems. *      Neuro:   Acute pain: scheduled Tylenol, Cassi prn, Gabapentin, and Robaxin.    Spines cleared  Anxiety: prn Xanax  Cardiac:   HLD: continue Lipitor  HTN: continue Coreg and verapamil.  Pulmonary:   No acute issues.  Monitor RR.  Maintain SpO2 > 92%.  Aggressive pulmonary hygiene. SMI  GI: No acute issues.  Carb controlled.  Senna and Glycolax Ducolax suppository  Renal:   CAMRYN- resolved. Nephrology following. Still on IVF.   Musculoskeletal:   R radius fx: NWB RUE, splint in place. Orthopedic surgery team following  R femur fx: NWB RLE, s/p ORIF 6/13/24, orthopedic surgery team following.  NWB RUE and RLE .  AM-PAC Score 16/24  ID: No acute issues.  No indications for antibiotics.    Endocrine:   Hyperglycemia: extremely labile. Change Lantus to 5 BID to start this PM. Being 
Bladder scanned patient around 1545 for 272 cc.   Had an episode of incontinence around 1645. Unable to obtain urine specimen at this time. Purewick in place.   
Called Sandhya's Ninohemi Gross update her of new room number [id number] which is a higher level care for closer monitoring.  
Called number on file to Tari [phone], another voicemail left to call hospital back.   
[facility] notified that Sandhya N/V X1 and surgery for 1930 today. IV fluids needed.  
Cervical Spine C Collar Clearance -  Patient CT Spine Imaging normal.  Patient does not complain of Cervical Spine tenderness upon palpation.  Patients C-Spine ranged.  C-spine clear, no need for C-Collar.     Electronically signed by Ashly Venegas DO on 6/13/2024 at 6:10 AM    
Consult faxed to rere for hinged knee brace.  
Department of Orthopedic Surgery  Resident Progress Note    POD #2 status post right retrograde femoral nail. Patient seen and examined, reports pain and burning sensation about anterior knee, denies any other acute complaints.  Reports improvement in pain with medication.      VITALS:  BP (!) 116/55   Pulse 73   Temp 98.4 °F (36.9 °C) (Oral)   Resp 10   Ht 1.651 m (5' 5\")   Wt 80.1 kg (176 lb 9.4 oz)   SpO2 97%   BMI 29.39 kg/m²     General: in no acute distress and alert    MUSCULOSKELETAL:   right lower extremity:  Dressing C/D/I, hinged ROM brace in place  Compartments soft and compressible  Calf is soft and nontender  +PF/DF/EHL  Brisk Cap refill, Toes warm and perfused  Distal sensation grossly intact to Peroneals, Sural, Saphenous, and tibial nrs    CBC:   Lab Results   Component Value Date/Time    WBC 11.1 06/15/2024 04:15 AM    HGB 7.8 06/15/2024 04:15 AM    HCT 23.7 06/15/2024 04:15 AM     06/15/2024 04:15 AM     PT/INR:    Lab Results   Component Value Date/Time    PROTIME 11.2 06/12/2024 07:00 PM    INR 1.0 06/12/2024 07:00 PM       ASSESSMENT  S/P right femur open reduction internal fixation- 6/13/24  Closed right radial styloid fracture    PLAN      Continue physical therapy and protocol: NWB - R LE  Nonweightbearing right upper extremity, maintain splint clean dry and intact  24 hour abx coverage complete  Deep venous thrombosis prophylaxis -aspirin 325 mg twice daily, okay to begin postop day #1, early mobilization  PT/OT  Pain Control: PO  D/C Plan:  pending sw/cm and therapy recommendations. Ok to discharge from orthopedic standpoint once appropriate discharge plan is in place  Orthopedic surgery will follow from periphery for remainder of visit, please contact with questions or concerns     Electronically signed by Edgardo Sahni DO on 6/15/2024 at 8:46 AM  
Department of Orthopedic Surgery  Resident Progress Note    POD 1. Patient seen and examined.  Resting comfortably in bed.  Pain moderately controlled but improved compared to preoperative levels.  Patient complaining of burning about the knee joint.  Denies any other orthopedic complaints this morning.    VITALS:  BP (!) 152/45   Pulse 95   Temp 99 °F (37.2 °C) (Temporal)   Resp 18   Ht 1.651 m (5' 5\")   Wt 65.8 kg (145 lb)   SpO2 100%   BMI 24.13 kg/m²     General: in no acute distress and alert    MUSCULOSKELETAL:   right lower extremity:  Dressing C/D/I, knee immobilizer in place  Compartments soft and compressible  Calf is soft and nontender  +PF/DF/EHL  Brisk Cap refill, Toes warm and perfused  Distal sensation grossly intact to Peroneals, Sural, Saphenous, and tibial nrs    CBC:   Lab Results   Component Value Date/Time    WBC 15.8 06/13/2024 08:20 AM    HGB 9.4 06/13/2024 08:20 AM    HCT 31.8 06/13/2024 08:20 AM     06/13/2024 08:20 AM     PT/INR:    Lab Results   Component Value Date/Time    PROTIME 11.2 06/12/2024 07:00 PM    INR 1.0 06/12/2024 07:00 PM       ASSESSMENT  S/P right femur open reduction internal fixation- 6/13/24    PLAN      Continue physical therapy and protocol: NWB - R LE  24 hour abx coverage to be completed today  Deep venous thrombosis prophylaxis -aspirin 325 mg twice daily, early mobilization  PT/OT  Pain Control: PO  D/C Plan:  pending sw/cm and therapy recommendations. Ok to discharge from orthopedic standpoint once appropriate discharge plan is in place    Electronically signed by Jose Hardin DO on 6/14/2024 at 6:44 AM    
Dexcom monitor removed prior to exam and placed in denture cup prior to leaving CT.   
Dr Sinha notified that hgb is 6.5 this AM.  
Dr. Sinha notified of 440 blood sugar via perfect serve. No new orders noted. Given 16 u humalog per sliding scale.     1200- stat labs ordered for CBC/BMP. Spoke with lab about not being able to get labs this AM after two attempts. New consult placed for IV team for lab draw      
Dr. Venegas made aware pt fsbs of 305 no new orders.  
IV team perfect served for IV access and lab draws    
KAYY Ureña CNP notified that CO2 9 and Potassium 5.6. As blood sugar to come down and the other levels will also improve.  
Notified Dr. Brooks regarding nephrology consult via perfect serve.   
Nurse report given to nurse Bardales at [facility]. Questions answered; aware about PAS  is arranged by CM/SW at 5pm or after, and Oxy script is attached with AVS dc papers. Peripheral IV line is removed. Purewick would be removed just before leaving.     5PM: PAS called in. New  time 7PM or after.  
Nurse to nurse called to Leroy in SICU. All questions answered.   
OCCUPATIONAL THERAPY TREATMENT NOTE    [facility]  OT BEDSIDE TREATMENT NOTE      Date:2024  Patient Name: Sandhya Adame  MRN: [id number]  : 1955  Room: [address] [place] [address]-A     Per OT Eval:  Evaluating OT: CARTER Garay, OTR/L; [id number]       Referring Provider: Edgardo Sahni DO     Specific Provider Orders/Date: OT Eval and Treat 2024       Diagnosis: Fall down stairs, initial encounter [W10.8XXA]  Fall, initial encounter [W19.XXXA]  Griselda-prosthetic femur fracture at tip of prosthesis, initial encounter [M97.8XXA, Z96.649]       Surgery: 2024 Open reduction internal fixation right severely comminuted femoral shaft fracture around a total knee arthroplasty with retrograde femoral nail        Pertinent Medical History:  has no past medical history on file.        Recommended Adaptive Equipment: TBD, AE for LE bathing and dressing PRN      Precautions:  Fall Risk, NWB RLE, hinged ROM brace 0-30 RLE, , +alarms, purewick, NWB RUE per ortho note 6/15/24         Assessment of current deficits    [x] Functional mobility            [x]ADLs           [x] Strength                  [x]Cognition    [x] Functional transfers          [x] IADLs         [x] Safety Awareness   [x]Endurance    [x] Fine Coordination                         [x] Balance      [] Vision/perception   []Sensation      []Gross Motor Coordination             [x] ROM           [] Delirium                   [] Motor Control      OT PLAN OF CARE   OT POC based on physician orders, patient diagnosis and results of clinical assessment     Frequency/Duration 2-5 days/wk for 2 weeks PRN   Specific OT Treatment Interventions to include:   * Instruction/training on adapted ADL techniques and AE recommendations to increase functional independence within precautions       * Training on energy conservation strategies, correct breathing pattern and techniques to improve independence/tolerance for self-care routine  * 
Occupational Therapy    Date:2024  Patient Name: Sandhya Adame  MRN: [id number]  : 1955  Room: [address] [place] [address]-A     OT orders received and chart reviewed. OT eval on hold at this time pending OR this day.  OT will follow and re-attempt eval as appropriate, pending Ortho post-op orders/ POC, at a later time/date.    Veronika Diallo, OTR/L   License #  [id number]    
Occupational Therapy    OCCUPATIONAL THERAPY INITIAL EVALUATION    [facility]  [address] [place], [place]        Date:2024                                                  Patient Name: Sandhya Adame    MRN: [id number]    : 1955    Room: [address] [place] [address]          Evaluating OT: Amada Renee, CARTER, OTR/L; [id number]      Referring Provider: Edgardo Sahni DO     Specific Provider Orders/Date: OT Eval and Treat 2024      Diagnosis: Fall down stairs, initial encounter [W10.8XXA]  Fall, initial encounter [W19.XXXA]  Griselda-prosthetic femur fracture at tip of prosthesis, initial encounter [M97.8XXA, Z96.649]       Surgery: 2024 Open reduction internal fixation right severely comminuted femoral shaft fracture around a total knee arthroplasty with retrograde femoral nail        Pertinent Medical History:  has no past medical history on file.       Recommended Adaptive Equipment: TBD, AE for LE bathing and dressing PRN     Precautions:  Fall Risk, NWB RLE, hinged ROM brace 0-30 RLE,  Per Ortho note  \"partial weightbearing to right upper extremity just no direct weightbearing through the hand at this time. \", +alarms, purewick       Assessment of current deficits    [x] Functional mobility  [x]ADLs  [x] Strength               [x]Cognition    [x] Functional transfers   [x] IADLs         [x] Safety Awareness   [x]Endurance    [x] Fine Coordination              [x] Balance      [] Vision/perception   []Sensation     []Gross Motor Coordination  [x] ROM  [] Delirium                   [] Motor Control     OT PLAN OF CARE   OT POC based on physician orders, patient diagnosis and results of clinical assessment    Frequency/Duration 2-5 days/wk for 2 weeks PRN   Specific OT Treatment Interventions to include:   * Instruction/training on adapted ADL techniques and AE recommendations to increase functional independence within precautions       * 
Patient admitted to SICU with the following belongings:  cell phone, shirt, pants, underwear, socks, and Dexcom monitor. The following belongings admitted with the patient, ALL and None, were sent home with the patient's family.   
Patient rebolledo removed.  Patient tolerated well. Griselda care given,  [id number].  
Patient states she is  DNR and has it on file but does not remember if she is a DNRCC or DNRCCA. Pt asked can we call clara Marc with number on file, no answer and voicemail left to call unit back.   
Physical Therapy  Physical Therapy Initial Assessment     Name: Sandhya Adame  : 1955  MRN: [id number]      Date of Service: 2024    Evaluating PT:  Claudia Guillen, PT, DPT, [id number]    Room #:  5409/5409-A  Diagnosis:  Fall down stairs, initial encounter [W10.8XXA]  Fall, initial encounter [W19.XXXA]  Griselda-prosthetic femur fracture at tip of prosthesis, initial encounter [M97.8XXA, Z96.649]  PMHx/PSHx:  No past medical history on file.   Past Surgical History:   Procedure Laterality Date    FEMUR SURGERY Right 2024    FEMUR OPEN REDUCTION INTERNAL FIXATION RIGHT DISTAL PERIPROSTHETIC FRACTURE [place] [facility] [place]      Procedure/Surgery:  FEMUR OPEN REDUCTION INTERNAL FIXATION RIGHT DISTAL PERIPROSTHETIC FRACTURE   Precautions:  Fall Risk, NWB RLE, KI to hinged knee brace 0-30,  Per Ortho note  \"partial weightbearing to right upper extremity just no direct weightbearing through the hand at this time. \", + Alarms, Purewick  Equipment Needs:  TBD    SUBJECTIVE:    Pt lives alone in a 2 story home with 1 stairs to enter and 1 rail.  Bed is on 1 floor and bath is on 1 floor. Pt rarely uses 2nd floor with 15 steps and 1 rail to access- pt reports scooting up steps.   Pt ambulated with no AD PTA.    OBJECTIVE:   Initial Evaluation  Date: 24 Treatment Short Term/ Long Term   Goals   AM-PAC 6 Clicks      Was pt agreeable to Eval/treatment? yes     Does pt have pain? 5/10 R patellar pain      Bed Mobility  Rolling: NT  Supine to sit: Moda  Sit to supine: ModA  Scooting: ModA  Rolling: Independent   Supine to sit: Independent   Sit to supine: Independent   Scooting: Independent    Transfers Sit to stand: MaxA (x1 partial rep)  Stand to sit: MaxA  Stand pivot: NT  Sit to stand: Talib  Stand to sit: Talib  Stand pivot: Talib AAD   Ambulation    NT  150+ feet with AAD Talib    Stair negotiation: ascended and descended  NT  1+ steps with 1 rail ModA   ROM BUE:  Refer to 
Physical Therapy  Physical Therapy Treatment    Name: Sandhya Adame  : 1955  MRN: [id number]      Date of Service: 2024    Evaluating PT:  Claudia Guillen, PT, DPT, [id number]    Room #:  5207/5207-A  Diagnosis:  Fall down stairs, initial encounter [W10.8XXA]  Fall, initial encounter [W19.XXXA]  Griselda-prosthetic femur fracture at tip of prosthesis, initial encounter [M97.8XXA, Z96.649]  PMHx/PSHx:  No past medical history on file.   Past Surgical History:   Procedure Laterality Date    FEMUR SURGERY Right 2024    FEMUR OPEN REDUCTION INTERNAL FIXATION RIGHT DISTAL PERIPROSTHETIC FRACTURE performed by Nathan Angeles MD at [facility] [place] [facility] [place]      Procedure/Surgery:  FEMUR OPEN REDUCTION INTERNAL FIXATION RIGHT DISTAL PERIPROSTHETIC FRACTURE   Precautions:  Fall Risk, NWB RLE, KI to hinged knee brace 0-30,   + Alarms, Purewick, NWB RUE per ortho note on 6/15  Equipment Needs:  TBD    SUBJECTIVE:    Pt lives alone in a 2 story home with 1 stairs to enter and 1 rail.  Bed is on 1 floor and bath is on 1 floor. Pt rarely uses 2nd floor with 15 steps and 1 rail to access- pt reports scooting up steps.   Pt ambulated with no AD PTA.    OBJECTIVE:   Initial Evaluation  Date: 24 Treatment  24 Short Term/ Long Term   Goals   AM-PAC 6 Clicks     Was pt agreeable to Eval/treatment? yes yes    Does pt have pain? 5/10 R patellar pain  RLE pain, does not rate    Bed Mobility  Rolling: NT  Supine to sit: Moda  Sit to supine: ModA  Scooting: ModA Rolling: NT  Supine to sit: ModA  Sit to supine: ModA  Scooting: SBA Rolling: Independent   Supine to sit: Independent   Sit to supine: Independent   Scooting: Independent    Transfers Sit to stand: MaxA (x1 partial rep)  Stand to sit: MaxA  Stand pivot: NT Sit to stand: ModA x2  Stand to sit: ModA x2  Stand pivot: NT Sit to stand: Talib  Stand to sit: Talib  Stand pivot: Talib AAD   Ambulation    NT Side steps with no AD ModA x2 150+ feet with AAD Talib    Stair 
Procedure consent signed, pt does not have blood band on. Type and screen ordered.   
Pt verified using 2 Identifiers and ID band with OR staff prior to acceptance to PACU/Phase II care.   
Res aware of pt recent , and due to receive Lispro 16U SQ lunch time.  
TRAUMA SURGERY  DAILY PROGRESS NOTE    Patient's Name/Date of Birth: Sandhya Adame / 1955    Date: 2024     Chief Complaint   Patient presents with    Fall     Fell down 15 steps, +head, R leg deformity, 200 fent, 40 ketamine.        Subjective:  Pt resting in bed. States her pain is managed. Follows with Dr. Mayes for nephrology.      Objective:  Last 24Hrs  Temp  Av.8 °F (36.6 °C)  Min: 97.2 °F (36.2 °C)  Max: 99 °F (37.2 °C)  Resp  Av.5  Min: 12  Max: 18  Pulse  Av.5  Min: 83  Max: 101  Systolic (24hrs), Av , Min:133 , Max:179     Diastolic (24hrs), Av, Min:45, Max:80    SpO2  Av.5 %  Min: 95 %  Max: 100 %    I/O last 3 completed shifts:  In: 900 [I.V.:900]  Out: 950 [Urine:675; Emesis/NG output:175; Blood:100]      General: In no acute distress  Cardiovascular: Warm throughout, no edema  Respiratory: no respiratory distress, equal chest rise  Abdomen: soft,  nontender, nondistended  Skin: no obvious rashes or lesions appreciated, no jaundice  Extremities: ACE wrap present on RLE, knee immobilizer in place, pt is able to dorsiflex and plantar flex, sensation intact. RUE in splint with motor and sensation intact      CBC  Recent Labs     24  0820   WBC 10.4 15.8*   RBC 3.88 3.21*   HGB 11.6 9.4*   HCT 36.3 31.8*   MCV 93.6 99.1   MCH 29.9 29.3   MCHC 32.0 29.6*   RDW 13.6 13.7    313   MPV 9.9 10.4       CMP  Recent Labs     24  19024  19024  0820 24  1754     --  135 138   K 4.1 4.05 5.6* 4.7   CL 99  --  98 103   CO2 20*  --  9* 19*   BUN 25*  --  38* 46*   CREATININE 1.3*  --  1.7* 2.2*   GLUCOSE 220*  --  278* 181*   CALCIUM 9.0  --  8.8 8.2*   BILITOT 0.7  --   --   --    ALKPHOS 83  --   --   --    AST 26  --   --   --    ALT 20  --   --   --          Assessment/Plan:    Patient Active Problem List   Diagnosis    Fall down stairs, initial encounter    Fall       68 y.o. female  s/p fall down steps with R 
patient, consider CT at 3 months, PET/CT, or tissue sampling.      In a high-risk patient, consider CT at 3 months, PET/CT, or tissue sampling.      Multiple Solid Nodules:      Nodule size less than 6 mm   In a low-risk patient, no routine follow-up.   In a high-risk patient, optional CT at 12 months.      Nodule size equals 6-8 mm   In a low-risk patient, CT at 3-6 months, then consider CT at 18-24 months.   In a high-risk patient, CT at 3-6 months, then CT at 18-24 months.      Nodule size greater than 8 mm   In a low-risk patient, CT at 3-6 months, then consider CT at 18-24 months.   In a high-risk patient, CT at 3-6 months, then CT at 18-24 months.      - Low risk patients include individuals with minimal or absent history of   smoking and other known risk factors.      - High risk patients include individuals with a history or smoking or known   risk factors.      Radiology 2017 http://pubs.rsna.org/doi/full/10.1148/radiol.0713925122         CT ABDOMEN PELVIS W IV CONTRAST Additional Contrast? None   Final Result   1.  No acute process in the abdomen or pelvis.      2.  Prominent right retrocrural lymph node.  Continued follow-up could be   helpful for further evaluation.         XR KNEE RIGHT (1-2 VIEWS)   Final Result   Complex comminuted fracture involving distal femur with displaced fracture   fragments.         XR PELVIS (1-2 VIEWS)   Final Result   No acute fracture or dislocation. If the patient's symptoms persists   follow-up studies in 10-14 days or CT scan may be helpful to exclude an   occult injury.         CT FEMUR RIGHT WO CONTRAST    (Results Pending)       PHYSICAL EXAM:     Central Nervous System  Loss of consciousness:  No    GCS:    Eye:  4 - Opens eyes on own  Motor:  6 - Follows simple motor commands  Verbal:  5 - Alert and oriented    Neuromuscular blockade: No  Pupil size:  Left 3 mm    Right 3 mm  Pupil reaction: Yes    Wiggles fingers: Left Yes Right Yes  Wiggles toes: Left Yes   Right 
review, and adjusting the clinical plan as well as urgent coordination with multiple specialists.     Pt is appropriate for downgrade from SICU today    Akshat Villarreal MD   6/16/2024  7:24 AM

## 2024-06-17 NOTE — CARE COORDINATION
06/17/24 Update CM Note: patient is approved to go to [facility] today as bed is ready and insurance has approved. Perfect serve sent to Trauma/ortho and renal for discharge. Electronically signed by Tara Espinoza RN CM on 6/17/2024 at 3:15 PM

## 2024-06-17 NOTE — PLAN OF CARE
Problem: Discharge Planning  Goal: Discharge to home or other facility with appropriate resources  6/17/2024 0934 by Magdalene Beck, RN  Outcome: Progressing     Problem: Pain  Goal: Verbalizes/displays adequate comfort level or baseline comfort level  6/17/2024 0934 by Magdalene Beck RN  Outcome: Progressing     Problem: Neurosensory - Adult  Goal: Achieves stable or improved neurological status  6/17/2024 0934 by Magdalene Beck, RN  Outcome: Progressing     Problem: Neurosensory - Adult  Goal: Achieves maximal functionality and self care  6/17/2024 0934 by Magdalene Beck, RN  Outcome: Progressing     Problem: Cardiovascular - Adult  Goal: Maintains optimal cardiac output and hemodynamic stability  6/17/2024 0934 by Magdalene Beck RN  Outcome: Progressing     Problem: Skin/Tissue Integrity - Adult  Goal: Incisions, wounds, or drain sites healing without S/S of infection  6/17/2024 0934 by Magdalene Beck, RN  Outcome: Progressing     Problem: Musculoskeletal - Adult  Goal: Return mobility to safest level of function  6/17/2024 0934 by Magdalene Beck, RN  Outcome: Progressing     Problem: Musculoskeletal - Adult  Goal: Maintain proper alignment of affected body part  6/17/2024 0934 by Magdalene Beck RN  Outcome: Progressing     Problem: Musculoskeletal - Adult  Goal: Return ADL status to a safe level of function  6/17/2024 0934 by Magdalene Beck, RN  Outcome: Progressing     Problem: Gastrointestinal - Adult  Goal: Maintains adequate nutritional intake  6/17/2024 0934 by Magdalene Beck, RN  Outcome: Progressing     Problem: Genitourinary - Adult  Goal: Absence of urinary retention  6/17/2024 0934 by Magdalene Beck, RN  Outcome: Progressing     Problem: Infection - Adult  Goal: Absence of infection at discharge  6/17/2024 0934 by Magdalene Beck, RN  Outcome: Progressing     Problem: Infection - Adult  Goal: Absence of infection during hospitalization  6/17/2024 0934 by Magdalene Beck, RN  Outcome:

## 2024-06-18 ENCOUNTER — APPOINTMENT (OUTPATIENT)
Dept: GENERAL RADIOLOGY | Age: 69
End: 2024-06-18
Payer: MEDICARE

## 2024-06-18 ENCOUNTER — HOSPITAL ENCOUNTER (OUTPATIENT)
Age: 69
Setting detail: OBSERVATION
Discharge: NURSING, REHAB OR LONG-TERM CARE | End: 2024-06-21
Attending: EMERGENCY MEDICINE | Admitting: INTERNAL MEDICINE
Payer: MEDICARE

## 2024-06-18 DIAGNOSIS — M79.604 RIGHT LEG PAIN: Primary | ICD-10-CM

## 2024-06-18 DIAGNOSIS — S52.501D CLOSED FRACTURE OF DISTAL END OF RIGHT RADIUS WITH ROUTINE HEALING, UNSPECIFIED FRACTURE MORPHOLOGY, SUBSEQUENT ENCOUNTER: ICD-10-CM

## 2024-06-18 LAB
GLUCOSE BLD-MCNC: 185 MG/DL (ref 74–99)
GLUCOSE BLD-MCNC: 214 MG/DL (ref 74–99)

## 2024-06-18 PROCEDURE — 73564 X-RAY EXAM KNEE 4 OR MORE: CPT

## 2024-06-18 PROCEDURE — 96375 TX/PRO/DX INJ NEW DRUG ADDON: CPT

## 2024-06-18 PROCEDURE — 73590 X-RAY EXAM OF LOWER LEG: CPT

## 2024-06-18 PROCEDURE — 6360000002 HC RX W HCPCS: Performed by: EMERGENCY MEDICINE

## 2024-06-18 PROCEDURE — 73502 X-RAY EXAM HIP UNI 2-3 VIEWS: CPT

## 2024-06-18 PROCEDURE — 96374 THER/PROPH/DIAG INJ IV PUSH: CPT

## 2024-06-18 PROCEDURE — 82962 GLUCOSE BLOOD TEST: CPT

## 2024-06-18 PROCEDURE — 99285 EMERGENCY DEPT VISIT HI MDM: CPT

## 2024-06-18 PROCEDURE — G0378 HOSPITAL OBSERVATION PER HR: HCPCS

## 2024-06-18 PROCEDURE — 6370000000 HC RX 637 (ALT 250 FOR IP): Performed by: EMERGENCY MEDICINE

## 2024-06-18 RX ORDER — HYDRALAZINE HYDROCHLORIDE 25 MG/1
25 TABLET, FILM COATED ORAL 2 TIMES DAILY
Status: DISCONTINUED | OUTPATIENT
Start: 2024-06-18 | End: 2024-06-21 | Stop reason: HOSPADM

## 2024-06-18 RX ORDER — INSULIN LISPRO 100 [IU]/ML
0-8 INJECTION, SOLUTION INTRAVENOUS; SUBCUTANEOUS
Status: DISCONTINUED | OUTPATIENT
Start: 2024-06-19 | End: 2024-06-21 | Stop reason: HOSPADM

## 2024-06-18 RX ORDER — METHOCARBAMOL 500 MG/1
500 TABLET, FILM COATED ORAL 2 TIMES DAILY
Status: DISCONTINUED | OUTPATIENT
Start: 2024-06-18 | End: 2024-06-21 | Stop reason: HOSPADM

## 2024-06-18 RX ORDER — CARVEDILOL 6.25 MG/1
6.25 TABLET ORAL 2 TIMES DAILY WITH MEALS
Status: DISCONTINUED | OUTPATIENT
Start: 2024-06-18 | End: 2024-06-21 | Stop reason: HOSPADM

## 2024-06-18 RX ORDER — FENTANYL CITRATE 50 UG/ML
25 INJECTION, SOLUTION INTRAMUSCULAR; INTRAVENOUS ONCE
Status: COMPLETED | OUTPATIENT
Start: 2024-06-18 | End: 2024-06-18

## 2024-06-18 RX ORDER — INSULIN LISPRO 100 [IU]/ML
0-4 INJECTION, SOLUTION INTRAVENOUS; SUBCUTANEOUS NIGHTLY
Status: DISCONTINUED | OUTPATIENT
Start: 2024-06-18 | End: 2024-06-21 | Stop reason: HOSPADM

## 2024-06-18 RX ORDER — DOCUSATE SODIUM 100 MG/1
100 CAPSULE, LIQUID FILLED ORAL DAILY
Status: DISCONTINUED | OUTPATIENT
Start: 2024-06-18 | End: 2024-06-21 | Stop reason: HOSPADM

## 2024-06-18 RX ORDER — GLUCAGON 1 MG/ML
1 KIT INJECTION PRN
Status: DISCONTINUED | OUTPATIENT
Start: 2024-06-18 | End: 2024-06-21 | Stop reason: HOSPADM

## 2024-06-18 RX ORDER — INSULIN GLARGINE 100 [IU]/ML
10 INJECTION, SOLUTION SUBCUTANEOUS NIGHTLY
Status: DISCONTINUED | OUTPATIENT
Start: 2024-06-18 | End: 2024-06-21 | Stop reason: HOSPADM

## 2024-06-18 RX ORDER — ALPRAZOLAM 1 MG/1
1 TABLET ORAL 2 TIMES DAILY
Status: DISCONTINUED | OUTPATIENT
Start: 2024-06-18 | End: 2024-06-21 | Stop reason: HOSPADM

## 2024-06-18 RX ORDER — OXYCODONE HYDROCHLORIDE AND ACETAMINOPHEN 5; 325 MG/1; MG/1
2 TABLET ORAL ONCE
Status: DISCONTINUED | OUTPATIENT
Start: 2024-06-18 | End: 2024-06-21 | Stop reason: HOSPADM

## 2024-06-18 RX ORDER — ATORVASTATIN CALCIUM 20 MG/1
20 TABLET, FILM COATED ORAL DAILY
Status: DISCONTINUED | OUTPATIENT
Start: 2024-06-18 | End: 2024-06-21 | Stop reason: HOSPADM

## 2024-06-18 RX ORDER — LANOLIN ALCOHOL/MO/W.PET/CERES
400 CREAM (GRAM) TOPICAL 2 TIMES DAILY
Status: DISCONTINUED | OUTPATIENT
Start: 2024-06-18 | End: 2024-06-21 | Stop reason: HOSPADM

## 2024-06-18 RX ORDER — ASPIRIN 81 MG/1
81 TABLET ORAL DAILY
Status: DISCONTINUED | OUTPATIENT
Start: 2024-06-18 | End: 2024-06-21 | Stop reason: HOSPADM

## 2024-06-18 RX ORDER — FUROSEMIDE 20 MG/1
20 TABLET ORAL DAILY
Status: DISCONTINUED | OUTPATIENT
Start: 2024-06-18 | End: 2024-06-21 | Stop reason: HOSPADM

## 2024-06-18 RX ORDER — DEXTROSE MONOHYDRATE 100 MG/ML
INJECTION, SOLUTION INTRAVENOUS CONTINUOUS PRN
Status: DISCONTINUED | OUTPATIENT
Start: 2024-06-18 | End: 2024-06-21 | Stop reason: HOSPADM

## 2024-06-18 RX ADMIN — FENTANYL CITRATE 25 MCG: 50 INJECTION INTRAMUSCULAR; INTRAVENOUS at 02:44

## 2024-06-18 RX ADMIN — DOCUSATE SODIUM 100 MG: 100 CAPSULE, LIQUID FILLED ORAL at 14:28

## 2024-06-18 RX ADMIN — HYDROMORPHONE HYDROCHLORIDE 0.5 MG: 1 INJECTION, SOLUTION INTRAMUSCULAR; INTRAVENOUS; SUBCUTANEOUS at 03:48

## 2024-06-18 ASSESSMENT — PAIN DESCRIPTION - ORIENTATION
ORIENTATION: RIGHT;LEFT
ORIENTATION: RIGHT

## 2024-06-18 ASSESSMENT — PAIN DESCRIPTION - FREQUENCY: FREQUENCY: CONTINUOUS

## 2024-06-18 ASSESSMENT — PAIN SCALES - GENERAL
PAINLEVEL_OUTOF10: 7
PAINLEVEL_OUTOF10: 7
PAINLEVEL_OUTOF10: 8
PAINLEVEL_OUTOF10: 4

## 2024-06-18 ASSESSMENT — PAIN DESCRIPTION - LOCATION
LOCATION: LEG;ARM
LOCATION: LEG;ELBOW

## 2024-06-18 ASSESSMENT — PAIN - FUNCTIONAL ASSESSMENT: PAIN_FUNCTIONAL_ASSESSMENT: 0-10

## 2024-06-18 ASSESSMENT — PAIN DESCRIPTION - DESCRIPTORS
DESCRIPTORS: BURNING;THROBBING;STABBING
DESCRIPTORS: ACHING;STABBING;THROBBING

## 2024-06-18 ASSESSMENT — PAIN DESCRIPTION - PAIN TYPE: TYPE: ACUTE PAIN

## 2024-06-18 NOTE — ED NOTES
Patient refuses to return to [facility] stated that she does not feel safe there, they did not have the meds available to control her pain and they put her in a diaper and told her that she had to pee in it all night and it would be changed in the AM. Patient did arrive in a diaper that was saturated. MD made aware and order placed for SW consult to assist with further placement d/t patient not being able to care for self at this time.

## 2024-06-18 NOTE — CARE COORDINATION
Social Work /Transition of Care:    Pt presents to the ED secondary to leg pain from [facility].  Pt discharged from this hospital yesterday 6/17 after a fall at home resulting in a right femur and right radius fracture.  Pt had surgery on 6/13 and has a right knee immobilizer.    BAYRON met with pt who is extremely upset about the lack of care she received at [facility] and is adamant about not returning there.  Pt reports that when she got to the facility yesterday, they did not have her prescriptions available, including pain meds, did not check her blood sugar or give her anything to eat,  and \"degraded her\" by putting a diaper on her and telling her to pee in it all night and they would change it in the morning.  Per RN in ED, pt did arrive in saturated diaper.    Pt stated she would return home but lives alone and the only living relative she has is her niece, who works two jobs and is unable to assist pt regularly.  Pt reports she is non weight bearing on her right leg and right arm.  SW to review other [facility]/SNF options with pt.  Pt would like referral to [facility] but [facility] is not in network.  Pt agreeable to referral to [facility].  SW made referral to Abbey with [facility] but they report they would like pt to have a long term discharge plan or discharge home with family.  Pt reports she would like referral to [facility] in [place].  SW left a message for admissions at [facility].  BAYRON spoke with pt's niece who reports she is on her way to the ED and will further review list of [facility]/SNF options with pt.    Electronically signed by MO Begum on 6/18/2024 at 5:14 PM

## 2024-06-18 NOTE — PROGRESS NOTES
I spoke with social work patient will need observation admission and plan for rehab placement tomorrow  is arranging rehab placement for patient

## 2024-06-18 NOTE — ED NOTES
Pt offered and refused pain medication twice. Pt is screaming at nurse that she is in agony and demanding to see social work because she wants to \"leave this place.\"

## 2024-06-18 NOTE — ED PROVIDER NOTES
HPI:  6/18/24, Time: 2:01 AM EDT         Sandhya Adame is a 68 y.o. female history of anxiety history of carotid stenosis history of CHF history of humeral fracture history of blood clots hypertension kidney disease phlebitis history of varicose vein presenting to the ED for right leg pain, beginning hours ago.  The complaint has been persistent, moderate in severity, and worsened by movement of right lower leg.  Patient reports she was just discharged from the hospital.  Patient reports she was reportedly moved from ambulance bed to hospital bed at nursing home and the way she was moved reportedly she is tiring more pain in her right leg.  Patient reporting no chest pain no difficulty breathing there is no abdominal pain.  Patient reports recent surgery in the leg secondary to fall down 13 steps.  Patient was given Percocet prior to arrival.  Patient still having pain.    ROS:   Pertinent positives and negatives are stated within HPI, all other systems reviewed and are negative.  --------------------------------------------- PAST HISTORY ---------------------------------------------  Past Medical History:  has a past medical history of Anxiety, Bilateral carotid artery stenosis, Bruit of left carotid artery, CHF (congestive heart failure) (HCC), Decreased dorsalis pedis pulse, History of deep vein thrombosis, Humeral fracture, Hx of blood clots, Hypertension, Kidney disease, Left carotid stenosis, Leg pain, Osteoarthritis of right knee, Superficial phlebitis of leg, right, Type II or unspecified type diabetes mellitus without mention of complication, not stated as uncontrolled, and Varicose veins of leg with pain, right.    Past Surgical History:  has a past surgical history that includes Cholecystectomy; Appendectomy; Knee Arthroplasty (Right, 2011); fracture surgery (Right); Upper gastrointestinal endoscopy (N/A, 2/19/2024); and Colonoscopy (N/A, 2/19/2024).    Social History:  reports that she has never smoked.  disposition  Patient condition is stable        NOTE: This report was transcribed using voice recognition software. Every effort was made to ensure accuracy; however, inadvertent computerized transcription errors may be present         Hugo Carr MD  06/18/24 0445       Hugo Carr MD  06/18/24 0448

## 2024-06-19 ENCOUNTER — HOSPITAL ENCOUNTER (OUTPATIENT)
Dept: OTHER | Age: 69
Setting detail: THERAPIES SERIES
Discharge: HOME | End: 2024-06-19

## 2024-06-19 PROBLEM — S52.91XA CLOSED FRACTURE OF RIGHT RADIUS: Status: ACTIVE | Noted: 2024-06-19

## 2024-06-19 LAB
GLUCOSE BLD-MCNC: 190 MG/DL (ref 74–99)
GLUCOSE BLD-MCNC: 224 MG/DL (ref 74–99)
GLUCOSE BLD-MCNC: 266 MG/DL (ref 74–99)
GLUCOSE BLD-MCNC: 270 MG/DL (ref 74–99)
GLUCOSE BLD-MCNC: 317 MG/DL (ref 74–99)

## 2024-06-19 PROCEDURE — 97535 SELF CARE MNGMENT TRAINING: CPT

## 2024-06-19 PROCEDURE — 97161 PT EVAL LOW COMPLEX 20 MIN: CPT

## 2024-06-19 PROCEDURE — 97165 OT EVAL LOW COMPLEX 30 MIN: CPT

## 2024-06-19 PROCEDURE — 97530 THERAPEUTIC ACTIVITIES: CPT

## 2024-06-19 PROCEDURE — 82962 GLUCOSE BLOOD TEST: CPT

## 2024-06-19 PROCEDURE — G0378 HOSPITAL OBSERVATION PER HR: HCPCS

## 2024-06-19 PROCEDURE — 6370000000 HC RX 637 (ALT 250 FOR IP): Performed by: EMERGENCY MEDICINE

## 2024-06-19 PROCEDURE — 6370000000 HC RX 637 (ALT 250 FOR IP): Performed by: INTERNAL MEDICINE

## 2024-06-19 RX ORDER — HYDROCODONE BITARTRATE AND ACETAMINOPHEN 5; 325 MG/1; MG/1
1 TABLET ORAL EVERY 6 HOURS PRN
Qty: 12 TABLET | Refills: 0 | Status: SHIPPED | OUTPATIENT
Start: 2024-06-19 | End: 2024-06-22

## 2024-06-19 RX ORDER — HYDROCODONE BITARTRATE AND ACETAMINOPHEN 5; 325 MG/1; MG/1
1 TABLET ORAL EVERY 6 HOURS PRN
Status: DISCONTINUED | OUTPATIENT
Start: 2024-06-19 | End: 2024-06-21 | Stop reason: HOSPADM

## 2024-06-19 RX ADMIN — INSULIN LISPRO 6 UNITS: 100 INJECTION, SOLUTION INTRAVENOUS; SUBCUTANEOUS at 12:27

## 2024-06-19 RX ADMIN — HYDROCODONE BITARTRATE AND ACETAMINOPHEN 1 TABLET: 5; 325 TABLET ORAL at 01:48

## 2024-06-19 RX ADMIN — DOCUSATE SODIUM 100 MG: 100 CAPSULE, LIQUID FILLED ORAL at 08:37

## 2024-06-19 RX ADMIN — Medication 400 MG: at 20:11

## 2024-06-19 RX ADMIN — INSULIN LISPRO 2 UNITS: 100 INJECTION, SOLUTION INTRAVENOUS; SUBCUTANEOUS at 07:09

## 2024-06-19 RX ADMIN — METHOCARBAMOL 500 MG: 500 TABLET ORAL at 08:36

## 2024-06-19 RX ADMIN — HYDRALAZINE HYDROCHLORIDE 25 MG: 25 TABLET ORAL at 08:37

## 2024-06-19 RX ADMIN — HYDROCODONE BITARTRATE AND ACETAMINOPHEN 1 TABLET: 5; 325 TABLET ORAL at 20:08

## 2024-06-19 RX ADMIN — EMPAGLIFLOZIN 10 MG: 10 TABLET, FILM COATED ORAL at 08:38

## 2024-06-19 RX ADMIN — ASPIRIN 81 MG: 81 TABLET, COATED ORAL at 01:43

## 2024-06-19 RX ADMIN — HYDRALAZINE HYDROCHLORIDE 25 MG: 25 TABLET ORAL at 20:11

## 2024-06-19 RX ADMIN — CARVEDILOL 6.25 MG: 6.25 TABLET, FILM COATED ORAL at 08:38

## 2024-06-19 RX ADMIN — APIXABAN 5 MG: 5 TABLET, FILM COATED ORAL at 01:50

## 2024-06-19 RX ADMIN — METHOCARBAMOL 500 MG: 500 TABLET ORAL at 01:43

## 2024-06-19 RX ADMIN — ALPRAZOLAM 1 MG: 1 TABLET ORAL at 01:42

## 2024-06-19 RX ADMIN — VERAPAMIL HYDROCHLORIDE 300 MG: 180 TABLET, FILM COATED, EXTENDED RELEASE ORAL at 08:39

## 2024-06-19 RX ADMIN — CARVEDILOL 6.25 MG: 6.25 TABLET, FILM COATED ORAL at 01:44

## 2024-06-19 RX ADMIN — ALPRAZOLAM 1 MG: 1 TABLET ORAL at 08:37

## 2024-06-19 RX ADMIN — ATORVASTATIN CALCIUM 20 MG: 20 TABLET, FILM COATED ORAL at 01:47

## 2024-06-19 RX ADMIN — HYDRALAZINE HYDROCHLORIDE 25 MG: 25 TABLET ORAL at 01:50

## 2024-06-19 RX ADMIN — METHOCARBAMOL 500 MG: 500 TABLET ORAL at 20:11

## 2024-06-19 RX ADMIN — ASPIRIN 81 MG: 81 TABLET, COATED ORAL at 08:38

## 2024-06-19 RX ADMIN — ALPRAZOLAM 1 MG: 1 TABLET ORAL at 20:11

## 2024-06-19 RX ADMIN — CARVEDILOL 6.25 MG: 6.25 TABLET, FILM COATED ORAL at 17:37

## 2024-06-19 RX ADMIN — Medication 400 MG: at 01:42

## 2024-06-19 RX ADMIN — ATORVASTATIN CALCIUM 20 MG: 20 TABLET, FILM COATED ORAL at 08:36

## 2024-06-19 RX ADMIN — Medication 400 MG: at 08:37

## 2024-06-19 RX ADMIN — FUROSEMIDE 20 MG: 20 TABLET ORAL at 08:37

## 2024-06-19 RX ADMIN — INSULIN GLARGINE 10 UNITS: 100 INJECTION, SOLUTION SUBCUTANEOUS at 01:40

## 2024-06-19 RX ADMIN — INSULIN GLARGINE 10 UNITS: 100 INJECTION, SOLUTION SUBCUTANEOUS at 20:18

## 2024-06-19 ASSESSMENT — PAIN DESCRIPTION - LOCATION
LOCATION: LEG
LOCATION: LEG
LOCATION: LEG;ARM

## 2024-06-19 ASSESSMENT — PAIN DESCRIPTION - DESCRIPTORS
DESCRIPTORS: ACHING;THROBBING;NAGGING
DESCRIPTORS: SPASM;DISCOMFORT;ACHING
DESCRIPTORS: DISCOMFORT;SPASM;SHARP

## 2024-06-19 ASSESSMENT — PAIN DESCRIPTION - ORIENTATION
ORIENTATION: RIGHT

## 2024-06-19 ASSESSMENT — PAIN SCALES - GENERAL
PAINLEVEL_OUTOF10: 5
PAINLEVEL_OUTOF10: 10
PAINLEVEL_OUTOF10: 6
PAINLEVEL_OUTOF10: 2

## 2024-06-19 ASSESSMENT — PAIN - FUNCTIONAL ASSESSMENT: PAIN_FUNCTIONAL_ASSESSMENT: PREVENTS OR INTERFERES WITH ALL ACTIVE AND SOME PASSIVE ACTIVITIES

## 2024-06-19 NOTE — CARE COORDINATION
6/19/24, BAYRON Update-After several attempts to fax the referral to Rafaela from [facility] contacted [facility].  Referral would be sent via email to Rafaela at:  [email or web address]  BAYRON to follow.    Ronda Cornell JOSELIN  [facility] Case Management  [phone]

## 2024-06-19 NOTE — PROGRESS NOTES
4 Eyes Skin Assessment     NAME:  Sandhya Adame  YOB: 1955  MEDICAL RECORD NUMBER:  [id number]    The patient is being assessed for  Admission    I agree that at least one RN has performed a thorough Head to Toe Skin Assessment on the patient. ALL assessment sites listed below have been assessed.      Areas assessed by both nurses:    Head, Face, Ears, Shoulders, Back, Chest, Arms, Elbows, Hands, Sacrum. Buttock, Coccyx, Ischium, Legs. Feet and Heels, and Under Medical Devices         Does the Patient have a Wound? No noted wound(s)       Graham Prevention initiated by RN: Yes  Wound Care Orders initiated by RN: No    Pressure Injury (Stage 3,4, Unstageable, DTI, NWPT, and Complex wounds) if present, place Wound referral order by RN under : No    New Ostomies, if present place, Ostomy referral order under : No     Nurse 1 eSignature: Electronically signed by Savanah Gallegos RN on 6/19/24 at 3:42 AM EDT    **SHARE this note so that the co-signing nurse can place an eSignature**    Nurse 2 eSignature: Electronically signed by Chacha Ruiz RN on 6/19/24 at 3:44 AM EDT

## 2024-06-19 NOTE — PLAN OF CARE
Problem: Pain  Goal: Verbalizes/displays adequate comfort level or baseline comfort level  6/19/2024 1034 by Lan Toribio RN  Outcome: Progressing  6/19/2024 0242 by Savanah Gallegos RN  Outcome: Progressing     Problem: Safety - Adult  Goal: Free from fall injury  6/19/2024 1034 by Lan Toribio RN  Outcome: Progressing  6/19/2024 0242 by Savanah Gallegos RN  Outcome: Progressing     Problem: Skin/Tissue Integrity  Goal: Absence of new skin breakdown  Description: 1.  Monitor for areas of redness and/or skin breakdown  2.  Assess vascular access sites hourly  3.  Every 4-6 hours minimum:  Change oxygen saturation probe site  4.  Every 4-6 hours:  If on nasal continuous positive airway pressure, respiratory therapy assess nares and determine need for appliance change or resting period.  6/19/2024 1034 by Lan Toribio RN  Outcome: Progressing  6/19/2024 0242 by Savanah Gallegos RN  Outcome: Progressing

## 2024-06-19 NOTE — PROGRESS NOTES
OCCUPATIONAL THERAPY INITIAL EVALUATION    [facility]  [address], [place]        Date:2024                                                  Patient Name: Sandhya Adame    MRN: [id number]    : 1955    Room: [address] [place] [address]      Evaluating OT: Han Rosen OTR/L; [id number]       Referring Provider: Josesito Lopez MD     Specific Provider Orders/Date: OT Eval and Treat 24       Diagnosis: Right leg pain - admitted from SNF     Surgery: None this admission     Pertinent Medical History:  has a past medical history of Anxiety, Bilateral carotid artery stenosis, Bruit of left carotid artery, CHF (congestive heart failure) ([facility]), Decreased dorsalis pedis pulse, History of deep vein thrombosis, Humeral fracture, Hx of blood clots, Hypertension, Kidney disease, Left carotid stenosis, Leg pain, Osteoarthritis of right knee, Superficial phlebitis of leg, right, Type II or unspecified type diabetes mellitus without mention of complication, not stated as uncontrolled, and Varicose veins of leg with pain, right.   24 Intramedullary fixation of periprosthetic distal R femoral fx.    Recommended Adaptive Equipment:  AE for LE bathing and dressing PRN     Precautions:  Fall Risk, NWB RUE/RLE, RLE hinged ROM brace, +alarms, cognition, incontinence     Assessment of current deficits    [x] Functional mobility  [x]ADLs  [x] Strength               [x]Cognition    [x] Functional transfers   [x] IADLs         [x] Safety Awareness   [x]Endurance    [x] Fine Coordination              [x] Balance      [] Vision/perception   []Sensation     []Gross Motor Coordination  [] ROM  [] Delirium                   [] Motor Control     OT PLAN OF CARE   OT POC based on physician orders, patient diagnosis and results of clinical assessment    Frequency/Duration 3-5 days/wk for 2 weeks PRN   Specific OT Treatment Interventions to include:   *  Status  Date: 6/19/24 Treatment Status  Date: STGs = LTGs  Time frame: 10-14 days   Feeding Setup  For tray table at bed-level   Independent    Grooming Minimal Assist   Modified Gallia    UB Dressing Moderate Assist   To tie gown posteriorly seated EOB  Modified Gallia    LB Dressing Dependent   Minimal Assist    Bathing Maximal Assist  Minimal Assist    Toileting Maximal Assist   For posterior hygiene care & clothing management while standing 2/2 urinary incontinence  Minimal Assist    Bed Mobility  Log Roll: NT   Supine to sit: Moderate Assist   Sit to supine: Moderate Assist   Supine to sit: Stand by Assist   Sit to supine: Stand by Assist    Functional Transfers Sit to stand:Moderate Assist   Stand to sit:Moderate Assist   Stand pivot: NT   Commode: NT   Sit to stand: Stand by Assist   Stand to sit: Stand by Assist   Stand pivot:  Stand by Assist   Commode:  Stand by Assist    Functional Mobility NT   Pt unable to safely advance LLE while maintaining NWB RLE/RUE this date  Stand by Assist w/ use of Appropriate AD   Balance Sitting:     Static - Stand by Assist      Dynamic - Stand by Assist   Standing: Moderate Assist   Sitting:     Static:  Independent      Dynamic:  Independent   Standing:  Stand by Assist    Activity Tolerance Fair tolerance w/ light activity  Good tolerance w/ light to mod activity   Visual/  Perceptual WFL     Safety Fair  Good  during ADL completion following NWB RUE/RLE precautions   Vitals /58, 81 HR s/p 1st sit<>stand transfer.        AROM (PROM) Strength Additional Info:    RUE  Shoulder & elbow flexion WFL. Limited distally 2/2 splint. Deferred - NWB RUE. Fair  and FMC/dexterity noted during ADL tasks     LUE WFL 4-/5 grossly tested Good  and wfl FMC/dexterity noted during ADL tasks       Hearing: WFL  Sensation: No c/o numbness or tingling  Tone: WFL  Edema: Unremarkable    Comment: Cleared by RN to see pt. Upon arrival patient supine in bed and agreeable

## 2024-06-19 NOTE — PROGRESS NOTES
Physical Therapy  Physical Therapy Initial Assessment     Name: Sandhya Adame  : 1955  MRN: [id number]      Date of Service: 2024    Evaluating PT:  Stoney Braxton PT, DPT    Room #:  8211/8211-B  Diagnosis:  Right leg pain [M79.604]  PMHx/PSHx:  CHF, DM II, CKD, s/p ORIF R femur fx   Procedure/Surgery:  N/A  Precautions:  NWB RLE, NWB RUE (distal radius fx), R hinged knee brace (0-30 deg), fall risk, bed alarm  Equipment Needs:  TBD    SUBJECTIVE:    Pt admitted from [facility]. Prior to injury, pt lives alone in a 2 story home with 1 steps to enter and 1 handrail.  Bedroom is on 1st floor, and bathroom is on 2nd floor.  Pt ambulated with no AD PTA.    OBJECTIVE:   Initial Evaluation  Date: 24 Treatment Short Term/ Long Term   Goals   AM-PAC 6 Clicks 10/24     Was pt agreeable to Eval/treatment? yes     Does pt have pain? 10 RLE     Bed Mobility  Rolling: min A  Supine to sit: mod A  Sit to supine: mod A  Scooting: min A  Rolling: mod I  Supine to sit: mod I  Sit to supine: mod I  Scooting: mod I   Transfers Sit to stand: mod A  Stand to sit: mod A  Stand pivot: NT  Sit to stand: SBA  Stand to sit: SBA  Stand pivot: CGA with AAD   Ambulation    NT  10'+ with AAD CGA   Stair negotiation: ascended and descended  NT  1 steps with AAD min A     Strength/ROM:   RLE grossly 2/5  LLE grossly 4/5  RLE AROM limited by weakness/pain, (hinged knee brace 0-30 deg)  LLE AROM WFL    Balance:   Static Sitting: SBA  Dynamic Sitting: SBA  Static Standing: min/mod A with no AD  Dynamic Standing: NT    Pt is A & O x 3  Sensation:  Pt denies numbness and tingling to extremities  Edema:  unremarkable    Vitals:  SpO2 and HR were stable during session  BP in sittin/58, HR 81 bpm    Therapeutic Exercises:    Bed mobility: supine<>sit, cued for EOB positioning  Balance: sitting EOB ~7', standing ~1' x3  Transfers: STS x3, cued for hand placement and postural correction  BLE AROM    Patient education  Pt  educated on role of PT, importance of functional mobility during hospital stay, safety with functional mobility    Patient response to education:   Pt verbalized understanding Pt demonstrated skill Pt requires further education in this area   yes partial yes     ASSESSMENT:    Conditions Requiring Skilled Therapeutic Intervention:    [x]Decreased strength     [x]Decreased ROM  [x]Decreased functional mobility  [x]Decreased balance   [x]Decreased endurance   [x]Decreased posture  []Decreased sensation  []Decreased coordination   []Decreased vision  [x]Decreased safety awareness   [x]Increased pain       Comments:  Pt supine in bed upon entering, pt agreeable to participate. Pt instructed to transfer to EOB, completing transfer with assist of trunk and RLE. Pt sitting upright with good sitting balance. Pt with no c/o dizziness with position change. Pt then cued for hand placement and instructed to stand from EOB. Pt standing with fair<> poor balance with arm in arm A. Pt tolerating standing well, maintaining NWB of RLE, cueing provided for postural correction. Pt demonstrated good tolerance to functional mobility, but unable to advance LLE this date. Pt was assisted back to supine at end of session. Pt positioned for comfort with all needs met and call bell in reach prior to exiting.    Treatment:  Patient practiced and was instructed in the following treatment:    Bed mobility training - pt given verbal and tactile cues to facilitate proper sequencing and safety during rolling and supine>sit as well as provided with physical assistance to complete task   Sitting EOB for >5 minutes for upright tolerance, postural awareness and BLE ROM  STS transfer training - pt educated on proper hand and foot placement, safety and sequencing, and use of verbal and tactile cues to safely complete sit<>stand transfers with physical assistance to complete task safely    Pt's/ family goals   1. [facility]    Prognosis is fair for reaching

## 2024-06-19 NOTE — DISCHARGE INSTR - COC
Continuity of Care Form    Patient Name: Sandhya Adame   :  1955  MRN:  [id number]    Admit date:  2024  Discharge date:  2024    Code Status Order: Full Code   Advance Directives:     Admitting Physician:  Josesito Lopez MD  PCP: Edgardo Colon MD    Discharging Nurse: Carlita EHAD  Discharging Hospital Unit/Room#: 8211/8211-B  Discharging Unit Phone Number: [phone]    Emergency Contact:   Extended Emergency Contact Information  Primary Emergency Contact: Tari Rader  Home Phone: [phone]  Relation: Niece/Nephew  Secondary Emergency Contact: NONE,NONE  Home Phone: [phone]  Relation: Other    Past Surgical History:  Past Surgical History:   Procedure Laterality Date    APPENDECTOMY      CHOLECYSTECTOMY      COLONOSCOPY N/A 2024    COLORECTAL CANCER SCREENING, NOT HIGH RISK performed by Kary Hicks MD at [facility] ENDOSCOPY    FRACTURE SURGERY Right     arm    KNEE ARTHROPLASTY Right 2011    Right TKA.  Karan Olmos MD    UPPER GASTROINTESTINAL ENDOSCOPY N/A 2024    EGD BIOPSY performed by Kary Hicks MD at [facility] ENDOSCOPY       Immunization History:   Immunization History   Administered Date(s) Administered    TD 2LF, TDVAX, (age 7y+), IM, 0.5mL 2022       Active Problems:  Patient Active Problem List   Diagnosis Code    Bruit of left carotid artery R09.89    Varicose veins of leg with pain, right I83.811    Bilateral carotid artery stenosis I65.23    Congestive heart failure of unknown etiology (HCC) I50.9    Type 2 diabetes mellitus with chronic kidney disease (HCC) E11.22    Diabetic neuropathy (HCC) E11.40    Vitamin D deficiency E55.9    Stage 3a chronic kidney disease (HCC) N18.31    Proteinuria due to type 2 diabetes mellitus (HCC) E11.29, R80.9    History of deep vein thrombosis Z86.718    Right leg pain M79.604       Isolation/Infection:   Isolation            No Isolation          Patient Infection Status       None to display

## 2024-06-19 NOTE — H&P
[facility]  History and Physical      CHIEF COMPLAINT: Right leg pain.    Reason for Admission: Right leg pain need for orthopedic evaluation    History Obtained From: Patient    PCP :  Edgardo Colon MD    [address] [facility] [address]      HISTORY OF PRESENT ILLNESS:      The patient is a 68 y.o. female came from a local nursing home because of excruciating pain in the right leg.  Patient was under trauma service recently and was just released to [facility].  Apparently nursing they were last time careful with her injuries.  Patient had repeated jolts to her body.  Patient then requested that she be sent to the ED.  Patient then requested a different nursing home.  At the time of questioning patient is complaining of ongoing pain.    Past Medical History:        Diagnosis Date    Anxiety     Bilateral carotid artery stenosis 10/07/2021    Bruit of left carotid artery 07/09/2020    CHF (congestive heart failure) (HCC)     Decreased dorsalis pedis pulse 07/09/2020    History of deep vein thrombosis 05/18/2023    Right peroneal vein, January 2023    Humeral fracture     right    Hx of blood clots     Hypertension     Kidney disease     Left carotid stenosis 07/09/2020    Leg pain     Osteoarthritis of right knee 05/04/2011    Superficial phlebitis of leg, right 08/12/2021    Type II or unspecified type diabetes mellitus without mention of complication, not stated as uncontrolled     Varicose veins of leg with pain, right 08/12/2021     Past Surgical History:        Procedure Laterality Date    APPENDECTOMY      CHOLECYSTECTOMY      COLONOSCOPY N/A 2/19/2024    COLORECTAL CANCER SCREENING, NOT HIGH RISK performed by Kary Hicks MD at [facility] ENDOSCOPY    FRACTURE SURGERY Right     arm    KNEE ARTHROPLASTY Right 2011    Right TKA.  Karan Olmos MD    UPPER GASTROINTESTINAL ENDOSCOPY N/A 2/19/2024    EGD BIOPSY performed by Kary Hicks MD at [facility]  prosthetic distal femoral fracture with   anatomic alignment.   2. Total knee arthroplasty otherwise intact.         XR TIBIA FIBULA RIGHT (2 VIEWS)   Final Result   1. Intramedullary fixation of jerry prosthetic distal femoral fracture with   anatomic alignment.   2. Total knee arthroplasty otherwise intact.         XR HIP RIGHT (2-3 VIEWS)   Final Result   1. Intramedullary fixation of jerry prosthetic distal femoral fracture with   anatomic alignment.   2. Total knee arthroplasty otherwise intact.                 ASSESSMENT :      Principal Problem:    Right leg pain  Active Problems:    Closed fracture of right radius  Resolved Problems:    * No resolved hospital problems. *    Diabetes mellitus type 2  History of CHF  History of DVT  Hypertension  Recent acute kidney injury creatinine normal  Cerebrovascular disease  Recent right femur fracture-  Recent right radius fracture    Plan :    Pain control  Social work regarding subacute rehab placement  Has immobilizer for right knee    Electronically signed by Josesito Lopez MD on 6/19/2024 at 12:51 PM    NOTE: This report was transcribed using voice recognition software. Every effort was made to ensure accuracy; however, inadvertent transcription errors may be present

## 2024-06-19 NOTE — PLAN OF CARE
Problem: Chronic Conditions and Co-morbidities  Goal: Patient's chronic conditions and co-morbidity symptoms are monitored and maintained or improved  Outcome: Progressing     Problem: Pain  Goal: Verbalizes/displays adequate comfort level or baseline comfort level  Outcome: Progressing     Problem: Skin/Tissue Integrity  Goal: Absence of new skin breakdown  Description: 1.  Monitor for areas of redness and/or skin breakdown  2.  Assess vascular access sites hourly  3.  Every 4-6 hours minimum:  Change oxygen saturation probe site  4.  Every 4-6 hours:  If on nasal continuous positive airway pressure, respiratory therapy assess nares and determine need for appliance change or resting period.  Outcome: Progressing     Problem: Safety - Adult  Goal: Free from fall injury  Outcome: Progressing     Problem: ABCDS Injury Assessment  Goal: Absence of physical injury  Outcome: Progressing

## 2024-06-19 NOTE — CARE COORDINATION
6/19/24, BAYRON Update-BAYRON met with patient in room to discuss discharge plan.  Went over [facility] Care was made a referral by ER BAYRON per patient's request.  Patient is now saying she wants other choices from the list she was given.  Could not find list and informed this worker to contact her niece.  Call placed to Tari the niece-choices are 1-[facility], Choice 2-[facility]  and Choice 3-[facility].  Call placed to Sathya at [facility]-no beds available.  Call placed to [facility] Magdalene-no beds available.  Call placed to Rafaela and left a vm for Rafaela to look at referral information that was sent via fax to her.  Will wait for response from Rafaela.  BAYRON to follow.      MO Morfin  [facility] Case Management  [phone]

## 2024-06-20 ENCOUNTER — APPOINTMENT (OUTPATIENT)
Dept: GENERAL RADIOLOGY | Age: 69
End: 2024-06-20
Payer: MEDICARE

## 2024-06-20 LAB
GLUCOSE BLD-MCNC: 118 MG/DL (ref 74–99)
GLUCOSE BLD-MCNC: 243 MG/DL (ref 74–99)
GLUCOSE BLD-MCNC: 268 MG/DL (ref 74–99)
GLUCOSE BLD-MCNC: 302 MG/DL (ref 74–99)

## 2024-06-20 PROCEDURE — G0378 HOSPITAL OBSERVATION PER HR: HCPCS

## 2024-06-20 PROCEDURE — 2580000003 HC RX 258: Performed by: INTERNAL MEDICINE

## 2024-06-20 PROCEDURE — 82962 GLUCOSE BLOOD TEST: CPT

## 2024-06-20 PROCEDURE — 6370000000 HC RX 637 (ALT 250 FOR IP): Performed by: EMERGENCY MEDICINE

## 2024-06-20 PROCEDURE — 73110 X-RAY EXAM OF WRIST: CPT

## 2024-06-20 PROCEDURE — 6370000000 HC RX 637 (ALT 250 FOR IP): Performed by: INTERNAL MEDICINE

## 2024-06-20 RX ORDER — SODIUM CHLORIDE 0.9 % (FLUSH) 0.9 %
5-40 SYRINGE (ML) INJECTION 2 TIMES DAILY
Status: DISCONTINUED | OUTPATIENT
Start: 2024-06-20 | End: 2024-06-21 | Stop reason: HOSPADM

## 2024-06-20 RX ADMIN — FUROSEMIDE 20 MG: 20 TABLET ORAL at 08:48

## 2024-06-20 RX ADMIN — APIXABAN 5 MG: 5 TABLET, FILM COATED ORAL at 08:48

## 2024-06-20 RX ADMIN — METHOCARBAMOL 500 MG: 500 TABLET ORAL at 20:38

## 2024-06-20 RX ADMIN — ALPRAZOLAM 1 MG: 1 TABLET ORAL at 20:38

## 2024-06-20 RX ADMIN — Medication 400 MG: at 20:38

## 2024-06-20 RX ADMIN — INSULIN LISPRO 4 UNITS: 100 INJECTION, SOLUTION INTRAVENOUS; SUBCUTANEOUS at 20:39

## 2024-06-20 RX ADMIN — SODIUM CHLORIDE, PRESERVATIVE FREE 5 ML: 5 INJECTION INTRAVENOUS at 10:27

## 2024-06-20 RX ADMIN — DOCUSATE SODIUM 100 MG: 100 CAPSULE, LIQUID FILLED ORAL at 08:48

## 2024-06-20 RX ADMIN — HYDROCODONE BITARTRATE AND ACETAMINOPHEN 1 TABLET: 5; 325 TABLET ORAL at 04:31

## 2024-06-20 RX ADMIN — Medication 400 MG: at 08:48

## 2024-06-20 RX ADMIN — INSULIN GLARGINE 10 UNITS: 100 INJECTION, SOLUTION SUBCUTANEOUS at 20:38

## 2024-06-20 RX ADMIN — ASPIRIN 81 MG: 81 TABLET, COATED ORAL at 08:48

## 2024-06-20 RX ADMIN — INSULIN LISPRO 4 UNITS: 100 INJECTION, SOLUTION INTRAVENOUS; SUBCUTANEOUS at 11:16

## 2024-06-20 RX ADMIN — ALPRAZOLAM 1 MG: 1 TABLET ORAL at 08:48

## 2024-06-20 RX ADMIN — HYDROCODONE BITARTRATE AND ACETAMINOPHEN 1 TABLET: 5; 325 TABLET ORAL at 13:26

## 2024-06-20 RX ADMIN — SODIUM CHLORIDE, PRESERVATIVE FREE 10 ML: 5 INJECTION INTRAVENOUS at 20:39

## 2024-06-20 RX ADMIN — ATORVASTATIN CALCIUM 20 MG: 20 TABLET, FILM COATED ORAL at 08:48

## 2024-06-20 RX ADMIN — HYDRALAZINE HYDROCHLORIDE 25 MG: 25 TABLET ORAL at 08:48

## 2024-06-20 RX ADMIN — METHOCARBAMOL 500 MG: 500 TABLET ORAL at 08:46

## 2024-06-20 RX ADMIN — INSULIN LISPRO 2 UNITS: 100 INJECTION, SOLUTION INTRAVENOUS; SUBCUTANEOUS at 16:12

## 2024-06-20 RX ADMIN — HYDRALAZINE HYDROCHLORIDE 25 MG: 25 TABLET ORAL at 20:38

## 2024-06-20 RX ADMIN — EMPAGLIFLOZIN 10 MG: 10 TABLET, FILM COATED ORAL at 08:49

## 2024-06-20 RX ADMIN — VERAPAMIL HYDROCHLORIDE 300 MG: 180 TABLET, FILM COATED, EXTENDED RELEASE ORAL at 08:49

## 2024-06-20 RX ADMIN — CARVEDILOL 6.25 MG: 6.25 TABLET, FILM COATED ORAL at 16:12

## 2024-06-20 RX ADMIN — CARVEDILOL 6.25 MG: 6.25 TABLET, FILM COATED ORAL at 08:48

## 2024-06-20 RX ADMIN — HYDROCODONE BITARTRATE AND ACETAMINOPHEN 1 TABLET: 5; 325 TABLET ORAL at 20:45

## 2024-06-20 ASSESSMENT — PAIN DESCRIPTION - LOCATION
LOCATION: LEG
LOCATION: ARM;LEG
LOCATION: KNEE;LEG

## 2024-06-20 ASSESSMENT — PAIN SCALES - GENERAL
PAINLEVEL_OUTOF10: 4
PAINLEVEL_OUTOF10: 6
PAINLEVEL_OUTOF10: 5
PAINLEVEL_OUTOF10: 5
PAINLEVEL_OUTOF10: 8
PAINLEVEL_OUTOF10: 7
PAINLEVEL_OUTOF10: 2

## 2024-06-20 ASSESSMENT — PAIN - FUNCTIONAL ASSESSMENT
PAIN_FUNCTIONAL_ASSESSMENT: ACTIVITIES ARE NOT PREVENTED
PAIN_FUNCTIONAL_ASSESSMENT: ACTIVITIES ARE NOT PREVENTED

## 2024-06-20 ASSESSMENT — PAIN SCALES - WONG BAKER: WONGBAKER_NUMERICALRESPONSE: HURTS A LITTLE BIT

## 2024-06-20 ASSESSMENT — PAIN DESCRIPTION - ORIENTATION
ORIENTATION: RIGHT

## 2024-06-20 ASSESSMENT — PAIN DESCRIPTION - DESCRIPTORS
DESCRIPTORS: ACHING;DULL;SORE
DESCRIPTORS: ACHING;SPASM;DULL

## 2024-06-20 ASSESSMENT — PAIN DESCRIPTION - FREQUENCY: FREQUENCY: CONTINUOUS

## 2024-06-20 NOTE — CONSULTS
Department of Orthopedic Surgery  Resident Consult Note    Reason for Consult: Patient recently seen for fracture; needs wrap checked    HISTORY OF PRESENT ILLNESS:       Patient is a 68 y.o. female who presents 1 week status post right distal femur ORIF with retrograde nail.  Patient seen and examined, reports she was at nursing facility which was treating her very poorly.  Patient reports she called 911 and was brought to hospital secondary to poor care at facility.  Orthopedic surgery consulted secondary to right upper extremity splint.  Patient denies any acute issues with right lower extremity status post intramedullary nailing.  Reports right upper extremity has discomfort about skin with proximal splint.  Denies distal numbness/tingling/paresthesias, denies any other orthopedic complaints.    Past Medical History:        Diagnosis Date    Anxiety     Bilateral carotid artery stenosis 10/07/2021    Bruit of left carotid artery 07/09/2020    CHF (congestive heart failure) (HCC)     Decreased dorsalis pedis pulse 07/09/2020    History of deep vein thrombosis 05/18/2023    Right peroneal vein, January 2023    Humeral fracture     right    Hx of blood clots     Hypertension     Kidney disease     Left carotid stenosis 07/09/2020    Leg pain     Osteoarthritis of right knee 05/04/2011    Superficial phlebitis of leg, right 08/12/2021    Type II or unspecified type diabetes mellitus without mention of complication, not stated as uncontrolled     Varicose veins of leg with pain, right 08/12/2021     Past Surgical History:        Procedure Laterality Date    APPENDECTOMY      CHOLECYSTECTOMY      COLONOSCOPY N/A 2/19/2024    COLORECTAL CANCER SCREENING, NOT HIGH RISK performed by Kary Hicks MD at [facility] ENDOSCOPY    FRACTURE SURGERY Right     arm    KNEE ARTHROPLASTY Right 2011    Right TKA.  Karan Olmos MD    UPPER GASTROINTESTINAL ENDOSCOPY N/A 2/19/2024    EGD BIOPSY performed by Kary Hicks MD at [facility]

## 2024-06-20 NOTE — CARE COORDINATION
6/20/24, SW Update-Met with patient at bedside and informed patient that [facility] and [facility] had no beds and that [facility] was still reviewing patient for acceptance.  Patient in agreement to going to [facility] for rehab.  SW to follow.      MO Morfin  [facility] Case Management  [phone]

## 2024-06-20 NOTE — CARE COORDINATION
6/20/24, BAYRON spoke with Rafaela from [facility]-patient is still being looked at by DON of facility.  Will know soon if patient has been accepted.  SW to follow.      MO Morfin  [facility] Case Management  [phone]

## 2024-06-20 NOTE — PROGRESS NOTES
Subjective:    Chief complaint:    Patient complaining of right arm pain  No problems overnight.  Denies chest pain, angina, and dyspnea.  Denies abdominal pain.  Tolerating diet.  No nausea or vomiting.    Objective:    BP (!) 132/46   Pulse 70   Temp 96.8 °F (36 °C) (Temporal)   Resp 18   Ht 1.651 m (5' 5\")   Wt 74.9 kg (165 lb 3.2 oz) Comment: with immobilizer and arm cast  SpO2 95%   BMI 27.49 kg/m²   General : Awake ,alert,no distress.  Heart:  RRR, no murmurs, gallops, or rubs.  Lungs:  CTA bilaterally, no wheeze, rales or rhonchi  Abd: bowel sounds present, nontender, nondistended, no masses  Extrem:  No clubbing, cyanosis, or edema    CBC:   Lab Results   Component Value Date/Time    WBC 6.1 04/17/2024 10:57 AM    RBC 4.21 04/17/2024 10:57 AM    HGB 12.3 04/17/2024 10:57 AM    HCT 39.0 04/17/2024 10:57 AM    MCV 92.6 04/17/2024 10:57 AM    MCH 29.2 04/17/2024 10:57 AM    MCHC 31.5 04/17/2024 10:57 AM    RDW 13.4 04/17/2024 10:57 AM     04/17/2024 10:57 AM    MPV 10.9 04/17/2024 10:57 AM     BMP:    Lab Results   Component Value Date/Time     04/17/2024 10:57 AM    K 4.2 04/17/2024 10:57 AM    CL 96 04/17/2024 10:57 AM    CO2 24 04/17/2024 10:57 AM    BUN 32 05/21/2024 04:12 PM    CREATININE 1.3 05/21/2024 04:12 PM    CALCIUM 10.0 04/17/2024 10:57 AM    GFRAA 50 01/12/2022 04:30 PM    LABGLOM 45 05/21/2024 04:12 PM    LABGLOM 49 04/17/2024 10:57 AM    GLUCOSE 128 04/17/2024 10:57 AM    GLUCOSE 319 06/08/2011 04:55 PM     PT/INR:  No results found for: \"PROTIME\", \"INR\"  Troponin:    Lab Results   Component Value Date/Time    TROPONINI 0.01 04/09/2020 03:58 PM       No results for input(s): \"LABURIN\" in the last 72 hours.  No results for input(s): \"BC\" in the last 72 hours.  No results for input(s): \"BLOODCULT2\" in the last 72 hours.      Current Facility-Administered Medications:     sodium chloride flush 0.9 % injection 5-40 mL, 5-40 mL, IntraVENous, BID, Josesito Lopez MD, 5 mL at  ASSESSMENT:  16yF w/o significat PMHx who presented to the ED with chief complaint of abdominal pain. Pt reports that the pain began today about 2pain located in the epigastric area. The pain worsens with movement and improved with warm compresses. The pt does report previous similar episodes in the past, that come and go specially after eating fatty/spice food. Pt repots some nausea, but denies any fever, chills or diarrhea. Physical exam findings, imaging, and labs as documented above.     PLAN:  -no acute surgical intervention  -patient can follow outpatient with Dr Garcia for further evaluation and possible surgical intervention  -pt was educated to return to the ED for any acute changes or pain out of control    Lines/Tubes: PIV    Above plan discussed with Attending Surgeon Dr. Garcia  , patient, patient family, and Primary team  11-17-22 @ 23:06    CONSULT SPECTRA: [phone]

## 2024-06-20 NOTE — CARE COORDINATION
6/20/24, BAYRON Update-After several calls to [facility] Rafaela and working with Patrizia in CM department-patient has been approved [facility].  Precert was transferred from the other facility that patient wa recently at-[facility].  Calls were made to [facility], [facility] and [facility] ambulance-none could transport patient today and have patent in the building by 7 pm.  Per Rafaela at [facility] patient would need to be there by 7pm in order to be able to get her meds tonight.  [facility] a 9am  tomorrow in the am [facility]  Those informed are Rafaela from [facility] and nursing.  Ambulance form (will need completed on day of discharge), face sheet and envelope is on soft chart.  SW to follow.      MO Morfin  [facility] Case Management  [phone]

## 2024-06-20 NOTE — PROGRESS NOTES
Ortho consult sent via [employer] serve to Dr. Greenberg. Physician added to care team.

## 2024-06-21 VITALS
SYSTOLIC BLOOD PRESSURE: 129 MMHG | BODY MASS INDEX: 27.52 KG/M2 | HEART RATE: 71 BPM | RESPIRATION RATE: 16 BRPM | TEMPERATURE: 96.8 F | HEIGHT: 65 IN | OXYGEN SATURATION: 99 % | WEIGHT: 165.2 LBS | DIASTOLIC BLOOD PRESSURE: 43 MMHG

## 2024-06-21 LAB — GLUCOSE BLD-MCNC: 205 MG/DL (ref 74–99)

## 2024-06-21 PROCEDURE — 6370000000 HC RX 637 (ALT 250 FOR IP): Performed by: EMERGENCY MEDICINE

## 2024-06-21 PROCEDURE — 6370000000 HC RX 637 (ALT 250 FOR IP): Performed by: INTERNAL MEDICINE

## 2024-06-21 PROCEDURE — G0378 HOSPITAL OBSERVATION PER HR: HCPCS

## 2024-06-21 PROCEDURE — 2580000003 HC RX 258: Performed by: INTERNAL MEDICINE

## 2024-06-21 PROCEDURE — 82962 GLUCOSE BLOOD TEST: CPT

## 2024-06-21 RX ADMIN — INSULIN LISPRO 2 UNITS: 100 INJECTION, SOLUTION INTRAVENOUS; SUBCUTANEOUS at 08:35

## 2024-06-21 RX ADMIN — VERAPAMIL HYDROCHLORIDE 300 MG: 180 TABLET, FILM COATED, EXTENDED RELEASE ORAL at 08:23

## 2024-06-21 RX ADMIN — METHOCARBAMOL 500 MG: 500 TABLET ORAL at 08:22

## 2024-06-21 RX ADMIN — EMPAGLIFLOZIN 10 MG: 10 TABLET, FILM COATED ORAL at 08:23

## 2024-06-21 RX ADMIN — ATORVASTATIN CALCIUM 20 MG: 20 TABLET, FILM COATED ORAL at 08:21

## 2024-06-21 RX ADMIN — CARVEDILOL 6.25 MG: 6.25 TABLET, FILM COATED ORAL at 08:22

## 2024-06-21 RX ADMIN — FUROSEMIDE 20 MG: 20 TABLET ORAL at 08:21

## 2024-06-21 RX ADMIN — ALPRAZOLAM 1 MG: 1 TABLET ORAL at 08:22

## 2024-06-21 RX ADMIN — HYDROCODONE BITARTRATE AND ACETAMINOPHEN 1 TABLET: 5; 325 TABLET ORAL at 06:25

## 2024-06-21 RX ADMIN — HYDRALAZINE HYDROCHLORIDE 25 MG: 25 TABLET ORAL at 08:21

## 2024-06-21 RX ADMIN — DOCUSATE SODIUM 100 MG: 100 CAPSULE, LIQUID FILLED ORAL at 08:20

## 2024-06-21 RX ADMIN — ASPIRIN 81 MG: 81 TABLET, COATED ORAL at 08:21

## 2024-06-21 RX ADMIN — SODIUM CHLORIDE, PRESERVATIVE FREE 10 ML: 5 INJECTION INTRAVENOUS at 08:24

## 2024-06-21 RX ADMIN — Medication 400 MG: at 08:23

## 2024-06-21 ASSESSMENT — PAIN SCALES - GENERAL
PAINLEVEL_OUTOF10: 0
PAINLEVEL_OUTOF10: 4

## 2024-06-21 ASSESSMENT — PAIN DESCRIPTION - LOCATION: LOCATION: ARM;LEG

## 2024-06-21 ASSESSMENT — PAIN DESCRIPTION - ORIENTATION: ORIENTATION: RIGHT

## 2024-06-21 ASSESSMENT — PAIN - FUNCTIONAL ASSESSMENT: PAIN_FUNCTIONAL_ASSESSMENT: ACTIVITIES ARE NOT PREVENTED

## 2024-06-21 ASSESSMENT — PAIN DESCRIPTION - ONSET: ONSET: ON-GOING

## 2024-06-21 ASSESSMENT — PAIN DESCRIPTION - FREQUENCY: FREQUENCY: CONTINUOUS

## 2024-06-21 ASSESSMENT — PAIN DESCRIPTION - PAIN TYPE: TYPE: ACUTE PAIN;SURGICAL PAIN

## 2024-06-21 ASSESSMENT — PAIN DESCRIPTION - DESCRIPTORS: DESCRIPTORS: ACHING;DISCOMFORT;SORE

## 2024-06-21 NOTE — PROGRESS NOTES
Subjective:    Chief complaint:    Has no new complaints  Feeling better    Objective:    BP (!) 129/43   Pulse 71   Temp 96.8 °F (36 °C) (Temporal)   Resp 16   Ht 1.651 m (5' 5\")   Wt 74.9 kg (165 lb 3.2 oz) Comment: with immobilizer and arm cast  SpO2 99%   BMI 27.49 kg/m²   General : Awake ,alert,no distress.  Heart:  RRR, no murmurs, gallops, or rubs.  Lungs:  CTA bilaterally, no wheeze, rales or rhonchi  Abd: bowel sounds present, nontender, nondistended, no masses  Extrem:  No clubbing, cyanosis, or edema    CBC:   Lab Results   Component Value Date/Time    WBC 6.6 06/17/2024 05:29 AM    RBC 2.66 06/17/2024 05:29 AM    HGB 8.3 06/17/2024 05:29 AM    HCT 25.0 06/17/2024 05:29 AM    MCV 94.0 06/17/2024 05:29 AM    MCH 31.2 06/17/2024 05:29 AM    MCHC 33.2 06/17/2024 05:29 AM    RDW 13.7 06/17/2024 05:29 AM     06/17/2024 05:29 AM    MPV 10.0 06/17/2024 05:29 AM     BMP:    Lab Results   Component Value Date/Time     06/17/2024 05:29 AM    K 4.2 06/17/2024 05:29 AM     06/17/2024 05:29 AM    CO2 21 06/17/2024 05:29 AM    BUN 31 06/17/2024 05:29 AM    CREATININE 1.0 06/17/2024 05:29 AM    CALCIUM 7.8 06/17/2024 05:29 AM    GFRAA 50 01/12/2022 04:30 PM    LABGLOM 63 06/17/2024 05:29 AM    LABGLOM 49 04/17/2024 10:57 AM    GLUCOSE 49 06/17/2024 05:29 AM    GLUCOSE 319 06/08/2011 04:55 PM     PT/INR:    Lab Results   Component Value Date/Time    PROTIME 11.2 06/12/2024 07:00 PM    INR 1.0 06/12/2024 07:00 PM     Troponin:    Lab Results   Component Value Date/Time    TROPONINI 0.01 04/09/2020 03:58 PM       No results for input(s): \"LABURIN\" in the last 72 hours.  No results for input(s): \"BC\" in the last 72 hours.  No results for input(s): \"BLOODCULT2\" in the last 72 hours.    No current facility-administered medications for this encounter.    Current Outpatient Medications:     HYDROcodone-acetaminophen (NORCO) 5-325 MG per tablet, Take 1 tablet by mouth every 6 hours as needed for Pain  MD  1:22 PM  6/21/2024    NOTE: This report was transcribed using voice recognition software. Every effort was made to ensure accuracy; however, inadvertent transcription errors may be present

## 2024-06-21 NOTE — PLAN OF CARE
Problem: Chronic Conditions and Co-morbidities  Goal: Patient's chronic conditions and co-morbidity symptoms are monitored and maintained or improved  Outcome: Adequate for Discharge     Problem: Pain  Goal: Verbalizes/displays adequate comfort level or baseline comfort level  Outcome: Adequate for Discharge     Problem: Skin/Tissue Integrity  Goal: Absence of new skin breakdown  Description: 1.  Monitor for areas of redness and/or skin breakdown  2.  Assess vascular access sites hourly  3.  Every 4-6 hours minimum:  Change oxygen saturation probe site  4.  Every 4-6 hours:  If on nasal continuous positive airway pressure, respiratory therapy assess nares and determine need for appliance change or resting period.  Outcome: Adequate for Discharge     Problem: Safety - Adult  Goal: Free from fall injury  Outcome: Adequate for Discharge     Problem: ABCDS Injury Assessment  Goal: Absence of physical injury  Outcome: Adequate for Discharge

## 2024-06-21 NOTE — PROGRESS NOTES
Department of Orthopedic Surgery  Resident Progress Note    Patient seen and examined, no acute issues overnight    VITALS:  BP (!) 129/43   Pulse 71   Temp 96.8 °F (36 °C) (Temporal)   Resp 16   Ht 1.651 m (5' 5\")   Wt 74.9 kg (165 lb 3.2 oz) Comment: with immobilizer and arm cast  SpO2 99%   BMI 27.49 kg/m²     General: alert and oriented to person, place and time, well-developed and well-nourished, in no acute distress    MUSCULOSKELETAL:   left upper extremity:  Splint taken down, mild skin tear dorsal aspect proximal wrist  +TTP about distal radius  Forearm/hand compartments soft and compressible  AIN/PIN/ulnar nerve function intact grossly  2+/4 radial pulse, hand warm and well-perfused  Sensation intact grossly to radial/ulnar/median nerve distributions to hand     CBC:   Lab Results   Component Value Date/Time    WBC 6.1 04/17/2024 10:57 AM    HGB 12.3 04/17/2024 10:57 AM    HCT 39.0 04/17/2024 10:57 AM     04/17/2024 10:57 AM     PT/INR:  No results found for: \"PROTIME\", \"INR\"    ASSESSMENT  Status post right femur retrograde nail 6/13  Closed right radial styloid fracture    PLAN      No acute orthopedic surgery intervention  Nonweightbearing right lower extremity, maintain hinged ROM brace 0-30  Nonweightbearing right upper extremity.  Splint taken down and replaced with Adaptic and ABD placed over wound  Post x-rays, will consider splint removal and brace or resplint application  Physical exam and imaging finding reviewed with patient  Pain control per primary  DVT prophylaxis: 81 mg aspirin twice daily  Follow up outpatient as previously  All patient questions sought and answered to best ability, patient is currently agreeable to plan.  Orthopedic surgery will follow from periphery for remainder of visit, please contact with questions or concerns     Electronically signed by Edgardo Sahni DO on 6/21/2024 at 8:03 AM

## 2024-06-21 NOTE — PROGRESS NOTES
Called nurse to nurse to Sunni at [facility] and faxed over AVS to fax number provided [phone]

## 2024-06-21 NOTE — DISCHARGE INSTR - DIET

## 2024-06-21 NOTE — CARE COORDINATION
6/21/24, BAYRON met with patient in room to confirm discharge plan will be to [facility] today.  [facility] will be [facility] patient up at 9 am today to go to facility.  Patient in agreement to go to [facility].  Contacted Tari-patient's niece and left a vm on patient leaving today for facility.  Patient requested this worker contact Tari to let her know where patient would be going.  PAS/RR, ambulance form, face sheet and envelope is on soft chart.  BAYRON to follow.      Ronda Cornell JOSELIN  [facility] Case Management  [phone]

## 2024-06-27 ENCOUNTER — OUTSIDE SERVICES (OUTPATIENT)
Dept: FAMILY MEDICINE CLINIC | Age: 69
End: 2024-06-27

## 2024-06-27 DIAGNOSIS — E11.9 TYPE 2 DIABETES MELLITUS WITHOUT COMPLICATION, WITHOUT LONG-TERM CURRENT USE OF INSULIN (HCC): ICD-10-CM

## 2024-06-27 DIAGNOSIS — D64.9 ANEMIA, UNSPECIFIED TYPE: Primary | ICD-10-CM

## 2024-06-27 DIAGNOSIS — I10 PRIMARY HYPERTENSION: ICD-10-CM

## 2024-06-27 DIAGNOSIS — N28.9 KIDNEY DISEASE: ICD-10-CM

## 2024-06-27 DIAGNOSIS — S72.401S CLOSED FRACTURE OF DISTAL END OF RIGHT FEMUR, UNSPECIFIED FRACTURE MORPHOLOGY, SEQUELA: ICD-10-CM

## 2024-06-28 DIAGNOSIS — T14.8XXA FRACTURE: ICD-10-CM

## 2024-06-28 DIAGNOSIS — S52.514D CLOSED NONDISPLACED FRACTURE OF STYLOID PROCESS OF RIGHT RADIUS WITH ROUTINE HEALING, SUBSEQUENT ENCOUNTER: Primary | ICD-10-CM

## 2024-06-28 NOTE — PROGRESS NOTES
6/27/2024    Sandhya Adame  1955    This resident is being seen today for skilled evaluation visit.  She is a resident who has long-term medical conditions including anxiety, bilateral carotid artery stenosis, left carotid bruit, congestive heart failure, decreased dorsalis pedis pulse, DVT, femoral fracture, blood clots, hypertension, kidney disease, left carotid stenosis, osteoarthritis to the right knee, super Jennifer phlebitis to the leg on the right, type 2 diabetes mellitus, varicose veins with a surgical history of cholecystectomy, appendectomy, knee arthroplasty to the right in 2011 with a fracture surgery on the right, upper gastrointestinal endoscopy 2/19/2024 and colonoscopy 2/19/2024.  She is a 68 y.o. female resident who is being seen today for skilled therapy with which this resident does have the benefit of physical therapy as well as Occupational Therapy and is seen in conjunction with orthopedic surgery with a follow-up appointment scheduled for Monday with Dr. Angeles she did admit that she had some underlying constipation but had a bowel movement Saturday and had some medication last night.  She furthermore states that she is a resident who continues to follow with the CHF clinic.  She has no complaints of headaches or dizziness at this time, sore throat, chest pain, nausea or vomiting, constipation or diarrhea, fever or chills, syncopal events or seizure activity.  She does admit that she has some ongoing pain from time to time but she has been trying to utilize her pain medication twice daily with Tylenol in between.  It is of note to mention that this is a resident who has a recent history of a recent fall in her home setting with which she does have a brace intact to the right lower extremity in a cast to her right upper extremity with a recent total knee arthroplasty and a distal femoral fracture.      Medications:  Verapamil 180 mg daily  Aspirin 81 mg daily  Atorvastatin 20 mg

## 2024-07-01 ENCOUNTER — HOSPITAL ENCOUNTER (OUTPATIENT)
Dept: GENERAL RADIOLOGY | Age: 69
Discharge: HOME | End: 2024-07-03
Payer: MEDICARE

## 2024-07-01 ENCOUNTER — OFFICE VISIT (OUTPATIENT)
Dept: ORTHOPEDIC SURGERY | Age: 69
End: 2024-07-01
Payer: MEDICARE

## 2024-07-01 VITALS — BODY MASS INDEX: 27.51 KG/M2 | HEIGHT: 65 IN | WEIGHT: 165.12 LBS

## 2024-07-01 DIAGNOSIS — T14.8XXA FRACTURE: ICD-10-CM

## 2024-07-01 DIAGNOSIS — S52.514A CLOSED NONDISPLACED FRACTURE OF STYLOID PROCESS OF RIGHT RADIUS, INITIAL ENCOUNTER: ICD-10-CM

## 2024-07-01 DIAGNOSIS — S72.401D CLOSED FRACTURE OF DISTAL END OF RIGHT FEMUR WITH ROUTINE HEALING, UNSPECIFIED FRACTURE MORPHOLOGY, SUBSEQUENT ENCOUNTER: ICD-10-CM

## 2024-07-01 PROCEDURE — 99024 POSTOP FOLLOW-UP VISIT: CPT | Performed by: PHYSICIAN ASSISTANT

## 2024-07-01 PROCEDURE — 73110 X-RAY EXAM OF WRIST: CPT

## 2024-07-01 PROCEDURE — 73552 X-RAY EXAM OF FEMUR 2/>: CPT

## 2024-07-01 PROCEDURE — L3809 WHFO W/O JOINTS PRE OTS: HCPCS | Performed by: PHYSICIAN ASSISTANT

## 2024-07-01 PROCEDURE — 99213 OFFICE O/P EST LOW 20 MIN: CPT | Performed by: PHYSICIAN ASSISTANT

## 2024-07-01 RX ORDER — ACETAMINOPHEN 325 MG/1
650 TABLET ORAL EVERY 6 HOURS PRN
COMMUNITY

## 2024-07-01 RX ORDER — HYDROCODONE BITARTRATE AND ACETAMINOPHEN 5; 325 MG/1; MG/1
1 TABLET ORAL EVERY 6 HOURS PRN
COMMUNITY

## 2024-07-01 RX ORDER — ERGOCALCIFEROL 1.25 MG/1
50000 CAPSULE ORAL WEEKLY
Qty: 12 CAPSULE | Refills: 1 | Status: SHIPPED | OUTPATIENT
Start: 2024-07-01

## 2024-07-01 RX ORDER — PHENOL 1.4 %
1 AEROSOL, SPRAY (ML) MUCOUS MEMBRANE DAILY
Qty: 30 TABLET | Refills: 3 | Status: SHIPPED | OUTPATIENT
Start: 2024-07-01

## 2024-07-01 NOTE — PATIENT INSTRUCTIONS
Removed staples  Steri strips should fall off in the shower at some point, if they do not fall off in 10 days, remove them  Dressing: Can remove dressing in 1-2 days then open to air  Can shower tomorrow over incision. NO Soaking or submerging incision in water until fully healed & all scabs are gone    WB:  Non-weight bearing on right upper and lower extremities    Knee Hinged ROM Brace: On at all times, can remove for bathing and therapy. Remove daily for evaluation of skin breakdown    Therapy: hinged ROM brace to the right knee 0-30 degrees. Please advance 10 degrees of flexion each week    Cock up wrist brace to the right upper extremity. Okay to remove for daily skin checks and to place xeroform and dry dressing over skin abrasions to the right upper extremity. Okay to wash abrasions with soapy water, dry well, and apply new dressing    Recommend calcium 600mg daily and vitamin D 50,000 IU weekly     DVT: Patients chart states taht she is taking eliquis and aspirin 81mg QD. Patient states she doesn't typically take eliquis. From an orthopedic standpoint, I would recommend either Eliquis OR aspirin 325mg twice per day per medicine. Patient needs to remain on one of these for DVT prophylaxis until she is weightbearing again  Pain control : Per facility physician    Continue with ice to the injured extremity 2-3 times per day for swelling  If able continue with elevation and compression    Follow up in 4 weeks with XR of the right wrist and right femur to be done in office

## 2024-07-01 NOTE — PROGRESS NOTES
Chief Complaint   Patient presents with    Follow-up     Patient here for  2wk p/o R Distal Femur ORIF- 6/13/24, Closed right radial styloid fracture; TTS (*GP 9/11/24). Patient states pain lvl 5          OP:SURGEON: Dr. Nathan Angeles MD  DATE OF PROCEDURE: 6/13/24  PROCEDURE:Open reduction internal fixation right severely comminuted femoral shaft fracture around a total knee arthroplasty with retrograde femoral nail    Non-op management of right distal radius fracture    POD: 2.5 weeks    Subjective:  Sandhya Adame is following up from the above surgery. She is NWB on right upper and right lower extremity. She is seen today as a prosper lift. Pain to extremity is moderate. They denies numbness, tingling to the right upper and right lower extremity. Denies calf pain, chest pain, or shortness of breath.  Patient continues to use DVT prophylaxis,  per facility paperwork she is taking eliquis and aspirin 81mg once per day .  She states prior to this injury she has not taken Eliquis in a very long time.  Patient is  participating in therapy, at her facility.     Review of Systems -  All pertinent positives/negative in HPI     Objective:    General: Alert and oriented X 3, normocephalic atraumatic, external ears and eye normal, sclera clear, no acute distress, respirations easy and unlabored with no audible wheezes, skin warm and dry, speech and dress appropriate for noted age, affect euthymic.    Extremity:  Right Lower Extremity  Skin clean dry and intact, without signs of infection  Incision healing well with staples in place  moderate edema noted  Compartments supple throughout thigh and leg  Calf supple and not tender  negative Homans  Demonstrates active knee flexion and extension 0-30 degrees. Active DF/PF.   States sensation intact to touch in sural, deep peroneal, superficial peroneal, saphenous, posterior tibial  nerve distributions to foot/ankle.  Palpable dorsalis pedis and posterior tibialis pulses, cap

## 2024-07-05 LAB
ALBUMIN SERPL-MCNC: 3.5 G/DL (ref 3.5–5.2)
ALP SERPL-CCNC: 146 U/L (ref 35–104)
ALT SERPL-CCNC: 16 U/L (ref 0–32)
ANION GAP SERPL CALCULATED.3IONS-SCNC: 21 MMOL/L (ref 7–16)
AST SERPL-CCNC: 18 U/L (ref 0–31)
BASOPHILS # BLD: 0.08 K/UL (ref 0–0.2)
BASOPHILS NFR BLD: 1 % (ref 0–2)
BILIRUB SERPL-MCNC: 0.6 MG/DL (ref 0–1.2)
BILIRUB UR QL STRIP: NEGATIVE
BUN SERPL-MCNC: 49 MG/DL (ref 6–23)
CALCIUM SERPL-MCNC: 9.2 MG/DL (ref 8.6–10.2)
CHLORIDE SERPL-SCNC: 92 MMOL/L (ref 98–107)
CLARITY UR: CLEAR
CO2 SERPL-SCNC: 20 MMOL/L (ref 22–29)
COLOR UR: YELLOW
CREAT SERPL-MCNC: 1.5 MG/DL (ref 0.5–1)
EOSINOPHIL # BLD: 0.63 K/UL (ref 0.05–0.5)
EOSINOPHILS RELATIVE PERCENT: 10 % (ref 0–6)
ERYTHROCYTE [DISTWIDTH] IN BLOOD BY AUTOMATED COUNT: 14.2 % (ref 11.5–15)
GFR, ESTIMATED: 37 ML/MIN/1.73M2
GLUCOSE SERPL-MCNC: 346 MG/DL (ref 74–99)
GLUCOSE UR STRIP-MCNC: >=1000 MG/DL
HCT VFR BLD AUTO: 30.6 % (ref 34–48)
HGB BLD-MCNC: 9.6 G/DL (ref 11.5–15.5)
HGB UR QL STRIP.AUTO: NEGATIVE
IMM GRANULOCYTES # BLD AUTO: 0.03 K/UL (ref 0–0.58)
IMM GRANULOCYTES NFR BLD: 1 % (ref 0–5)
KETONES UR STRIP-MCNC: 15 MG/DL
LEUKOCYTE ESTERASE UR QL STRIP: ABNORMAL
LYMPHOCYTES NFR BLD: 1.42 K/UL (ref 1.5–4)
LYMPHOCYTES RELATIVE PERCENT: 24 % (ref 20–42)
MCH RBC QN AUTO: 30.8 PG (ref 26–35)
MCHC RBC AUTO-ENTMCNC: 31.4 G/DL (ref 32–34.5)
MCV RBC AUTO: 98.1 FL (ref 80–99.9)
MONOCYTES NFR BLD: 0.55 K/UL (ref 0.1–0.95)
MONOCYTES NFR BLD: 9 % (ref 2–12)
NEUTROPHILS NFR BLD: 55 % (ref 43–80)
NEUTS SEG NFR BLD: 3.33 K/UL (ref 1.8–7.3)
NITRITE UR QL STRIP: NEGATIVE
PH UR STRIP: 6 [PH] (ref 5–9)
PLATELET # BLD AUTO: 334 K/UL (ref 130–450)
PMV BLD AUTO: 11.1 FL (ref 7–12)
POTASSIUM SERPL-SCNC: 5.3 MMOL/L (ref 3.5–5)
PROT SERPL-MCNC: 6.1 G/DL (ref 6.4–8.3)
PROT UR STRIP-MCNC: NEGATIVE MG/DL
RBC # BLD AUTO: 3.12 M/UL (ref 3.5–5.5)
RBC #/AREA URNS HPF: NORMAL /HPF
SODIUM SERPL-SCNC: 133 MMOL/L (ref 132–146)
SP GR UR STRIP: 1.01 (ref 1–1.03)
UROBILINOGEN UR STRIP-ACNC: 0.2 EU/DL (ref 0–1)
WBC #/AREA URNS HPF: NORMAL /HPF
WBC OTHER # BLD: 6 K/UL (ref 4.5–11.5)

## 2024-07-06 LAB
MICROORGANISM SPEC CULT: ABNORMAL
SPECIMEN DESCRIPTION: ABNORMAL

## 2024-07-08 ENCOUNTER — TELEPHONE (OUTPATIENT)
Dept: ENDOCRINOLOGY | Age: 69
End: 2024-07-08

## 2024-07-11 ENCOUNTER — OFFICE VISIT (OUTPATIENT)
Dept: ENDOCRINOLOGY | Age: 69
End: 2024-07-11
Payer: MEDICARE

## 2024-07-11 VITALS — SYSTOLIC BLOOD PRESSURE: 124 MMHG | DIASTOLIC BLOOD PRESSURE: 70 MMHG

## 2024-07-11 DIAGNOSIS — Z79.4 TYPE 2 DIABETES MELLITUS WITH HYPERGLYCEMIA, WITH LONG-TERM CURRENT USE OF INSULIN (HCC): Primary | ICD-10-CM

## 2024-07-11 DIAGNOSIS — E11.42 DIABETIC POLYNEUROPATHY ASSOCIATED WITH TYPE 2 DIABETES MELLITUS (HCC): ICD-10-CM

## 2024-07-11 DIAGNOSIS — N18.31 STAGE 3A CHRONIC KIDNEY DISEASE (HCC): ICD-10-CM

## 2024-07-11 DIAGNOSIS — E11.65 TYPE 2 DIABETES MELLITUS WITH HYPERGLYCEMIA, WITH LONG-TERM CURRENT USE OF INSULIN (HCC): Primary | ICD-10-CM

## 2024-07-11 PROCEDURE — 3044F HG A1C LEVEL LT 7.0%: CPT | Performed by: NURSE PRACTITIONER

## 2024-07-11 PROCEDURE — 95251 CONT GLUC MNTR ANALYSIS I&R: CPT | Performed by: NURSE PRACTITIONER

## 2024-07-11 PROCEDURE — 1123F ACP DISCUSS/DSCN MKR DOCD: CPT | Performed by: NURSE PRACTITIONER

## 2024-07-11 PROCEDURE — 99214 OFFICE O/P EST MOD 30 MIN: CPT | Performed by: NURSE PRACTITIONER
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
## 2024-07-29 ENCOUNTER — OFFICE VISIT (OUTPATIENT)
Dept: ORTHOPEDIC SURGERY | Age: 69
End: 2024-07-29
Payer: MEDICARE

## 2024-07-29 ENCOUNTER — HOSPITAL ENCOUNTER (OUTPATIENT)
Dept: GENERAL RADIOLOGY | Age: 69
Discharge: HOME | End: 2024-07-31
Payer: MEDICARE

## 2024-07-29 VITALS — HEIGHT: 65 IN | BODY MASS INDEX: 27.51 KG/M2 | WEIGHT: 165.12 LBS

## 2024-07-29 DIAGNOSIS — S72.401D CLOSED FRACTURE OF DISTAL END OF RIGHT FEMUR WITH ROUTINE HEALING, UNSPECIFIED FRACTURE MORPHOLOGY, SUBSEQUENT ENCOUNTER: ICD-10-CM

## 2024-07-29 DIAGNOSIS — S52.514A CLOSED NONDISPLACED FRACTURE OF STYLOID PROCESS OF RIGHT RADIUS, INITIAL ENCOUNTER: ICD-10-CM

## 2024-07-29 DIAGNOSIS — S52.514A CLOSED NONDISPLACED FRACTURE OF STYLOID PROCESS OF RIGHT RADIUS, INITIAL ENCOUNTER: Primary | ICD-10-CM

## 2024-07-29 PROCEDURE — 99024 POSTOP FOLLOW-UP VISIT: CPT | Performed by: PHYSICIAN ASSISTANT

## 2024-07-29 PROCEDURE — 73110 X-RAY EXAM OF WRIST: CPT

## 2024-07-29 PROCEDURE — 99212 OFFICE O/P EST SF 10 MIN: CPT

## 2024-07-29 PROCEDURE — 73552 X-RAY EXAM OF FEMUR 2/>: CPT

## 2024-07-29 NOTE — PATIENT INSTRUCTIONS
Continue nonweightbearing right lower extremity.    Partial weightbearing right upper extremity.  Okay to start coming out of removable wrist brace.    Hinged brace set at 0-70 today.  Brace to be adjusted increasing 10 degrees flexion per week.  Okay to remove brace for hygiene and range of motion exercises of the knee.    Patient will finish current Eliquis dosage then transition to aspirin 325 mg twice daily for DVT prophylaxis.

## 2024-07-29 NOTE — PROGRESS NOTES
Chief Complaint   Patient presents with    Follow-up     p/o R Distal Femur ORIF- 6/13/24, Closed right radial styloid fracture; TTS (*GP 9/11/24). Patient states pain lvl 4          OP:SURGEON: Dr. Nathan Angeles MD  DATE OF PROCEDURE: 6-13-24  PROCEDURE: Open reduction internal fixation right severely comminuted femoral shaft fracture around a total knee arthroplasty with retrograde femoral nail     POD: 6.5 weeks    Subjective:  Sandhya Adame is following up from the above surgery. She is NWB on right upper and right lower extremity.  Pain to extremity is mild and is not taking prescribed pain medication. They denies numbness or tingling to the right lower extremity. Denies calf pain, chest pain, or shortness of breath.  Patient continues to use DVT prophylaxis,  Eliquis . Patient is not participating in therapy at this time.  Currently patient is in a hinged ROM knee brace set at 0-40.  States that she has been working on knee ROM exercises and therapy as well as exercises for her wrist.  She has been wearing the removable left wrist brace.  She does have some occasional discomfort in the left leg, denies pain in the left wrist.     Review of Systems -  All pertinent positives/negative in HPI     Objective:    General: Alert and oriented X 3, normocephalic atraumatic, external ears and eye normal, sclera clear, no acute distress, respirations easy and unlabored with no audible wheezes, skin warm and dry, speech and dress appropriate for noted age, affect euthymic.    Extremity:  Right Lower Extremity  Skin clean dry and intact, without signs of infection   Incision well-healed  mild edema noted to the knee area  Compartments supple throughout thigh and leg  Calf supple and not tender  negative Homans  Demonstrates active motion with ankle DF/PF  Knee AROM 25-85  Presents in a wheelchair  States sensation intact to touch in sural, deep peroneal, superficial peroneal, saphenous, posterior tibial  nerve

## 2024-08-26 ENCOUNTER — TELEPHONE (OUTPATIENT)
Dept: ORTHOPEDIC SURGERY | Age: 69
End: 2024-08-26

## 2024-08-26 NOTE — TELEPHONE ENCOUNTER
The patient called and stated that she is in a immobilizer and she is in therapy. She state that her right foot is turning bright red from her toes past her ankle. She has upcoming appointment but wants to know if she can be seen this week.    Last OV: 07/29/2024 - closed nondisplaced fracture of styloid process of right radius     Surgery: 06/13/2024 - femur ORIF    Sent to provider for further recommendations  Future Appointments   Date Time Provider Department Center   9/11/2024  8:00 AM Nathan Angeles MD SE Ortho [facility]   9/20/2024 10:20 AM Jess Sanchez MD [facility]   9/25/2024  8:30 AM [facility] CHF ROOM 3 [facility]   11/25/2024 11:00 AM Yenifer Douglas, APRN - NP [facility] ENDO [facility]   6/5/2025  2:30 PM BEVERLEY BRANCH VAS US 1 SEYZ CARDIO SE Rad[facility]Car   6/5/2025  3:00 PM Cameron Garcia MD VASC/MED [facility]

## 2024-08-26 NOTE — TELEPHONE ENCOUNTER
Spoke to the patient and she could not go to [place] tomorrow due to ride transportation. Scheduled appointment on Wednesday and she verbalized understanding  Future Appointments   Date Time Provider Department Center   8/28/2024  8:45 AM Ntahan Angeles MD  Ortho [facility]   9/11/2024  8:00 AM Nathan Angeles MD  Ortho [facility]   9/20/2024 10:20 AM Jess Sanchez MD [place] [facility]   9/25/2024  8:30 AM [facility] CHF ROOM 3 SE CHF [facility]   11/25/2024 11:00 AM Yenifer Douglas, APRN - NP [facility] ENDO [facility]   6/5/2025  2:30 PM BEVERLEY BRANCH VAS US 1 SEYZ CARDIO [facility] Rad/Car   6/5/2025  3:00 PM Cameron Garcia MD VASC/MED [facility]

## 2024-08-28 ENCOUNTER — HOSPITAL ENCOUNTER (OUTPATIENT)
Dept: GENERAL RADIOLOGY | Age: 69
Discharge: HOME | End: 2024-08-30
Payer: MEDICARE

## 2024-08-28 ENCOUNTER — OFFICE VISIT (OUTPATIENT)
Dept: ORTHOPEDIC SURGERY | Age: 69
End: 2024-08-28
Payer: MEDICARE

## 2024-08-28 ENCOUNTER — HOSPITAL ENCOUNTER (OUTPATIENT)
Dept: ULTRASOUND IMAGING | Age: 69
Discharge: HOME | End: 2024-08-30
Payer: MEDICARE

## 2024-08-28 ENCOUNTER — TELEPHONE (OUTPATIENT)
Dept: ORTHOPEDIC SURGERY | Age: 69
End: 2024-08-28

## 2024-08-28 VITALS — HEIGHT: 65 IN | BODY MASS INDEX: 27.51 KG/M2 | WEIGHT: 165.12 LBS

## 2024-08-28 DIAGNOSIS — M79.661 PAIN OF RIGHT CALF: ICD-10-CM

## 2024-08-28 DIAGNOSIS — S72.401D CLOSED FRACTURE OF DISTAL END OF RIGHT FEMUR WITH ROUTINE HEALING, UNSPECIFIED FRACTURE MORPHOLOGY, SUBSEQUENT ENCOUNTER: ICD-10-CM

## 2024-08-28 DIAGNOSIS — R60.9 SWELLING: Primary | ICD-10-CM

## 2024-08-28 DIAGNOSIS — M79.604 PAIN OF RIGHT LOWER EXTREMITY: ICD-10-CM

## 2024-08-28 DIAGNOSIS — S52.514A CLOSED NONDISPLACED FRACTURE OF STYLOID PROCESS OF RIGHT RADIUS, INITIAL ENCOUNTER: Primary | ICD-10-CM

## 2024-08-28 DIAGNOSIS — R60.9 SWELLING: ICD-10-CM

## 2024-08-28 DIAGNOSIS — S52.514A CLOSED NONDISPLACED FRACTURE OF STYLOID PROCESS OF RIGHT RADIUS, INITIAL ENCOUNTER: ICD-10-CM

## 2024-08-28 DIAGNOSIS — M79.89 LEG SWELLING: ICD-10-CM

## 2024-08-28 LAB — ECHO BSA: 1.85 M2

## 2024-08-28 PROCEDURE — 73110 X-RAY EXAM OF WRIST: CPT

## 2024-08-28 PROCEDURE — 93971 EXTREMITY STUDY: CPT

## 2024-08-28 PROCEDURE — 99213 OFFICE O/P EST LOW 20 MIN: CPT

## 2024-08-28 PROCEDURE — 73552 X-RAY EXAM OF FEMUR 2/>: CPT

## 2024-08-28 RX ORDER — AMMONIUM LACTATE 12 G/100G
CREAM TOPICAL
COMMUNITY
Start: 2024-08-22

## 2024-08-28 NOTE — PROGRESS NOTES
Chief Complaint   Patient presents with    Follow-up     problem with toes and ankle bright red and edema, DOS: 07/29/2024 - closed nondisplaced fracture of styloid process of right radius TTS. Patient states pain lvl 3. Patient states swelling in right ankle with actively           OP:SURGEON: Dr. Nathan Angeles MD  DATE OF PROCEDURE: 6-13-24  PROCEDURE: Open reduction internal fixation right severely comminuted femoral shaft fracture around a total knee arthroplasty with retrograde femoral nail     POD: 11 weeks    Subjective:  Sandhya Adame is following up from the above surgery. She is NWB on right lower extremity and PWB RUE.  Pain to extremity is mild and is not taking prescribed pain medication. They denies numbness or tingling to the right upper and right lower extremity. Denies calf pain, chest pain, or shortness of breath.  Patient continues to use DVT prophylaxis,  Aspirin 325 daily .  Patient states that she has been taking aspirin 325 mg daily although she was advised at her last office visit to take aspirin 325 mg twice daily after transitioning from the [facility].  Patient is participating in therapy, home health care .  Patient is doing okay after surgery.  She presents today complaining of some swelling and redness in the right ankle and right foot over the past week.  She has been removing the hinged ROM knee brace for hygiene and knee ROM exercises.  Denies any recent injuries to the right ankle.     Review of Systems -  All pertinent positives/negative in HPI     Objective:    General: Alert and oriented X 3, normocephalic atraumatic, external ears and eye normal, sclera clear, no acute distress, respirations easy and unlabored with no audible wheezes, skin warm and dry, speech and dress appropriate for noted age, affect euthymic.    Extremity:  Right Lower Extremity  Skin clean dry and intact, without signs of infection   Incisions well-healed  mild edema and erythema noted to the

## 2024-08-28 NOTE — TELEPHONE ENCOUNTER
I spoke to the radiology department regarding patient's stat ultrasound results today which were positive for DVT in right lower extremity.  I was transferred and spoke to the patient as well.  Patient states she has been taking aspirin 325 mg once a day although she was advised at her last office visit to take the aspirin 325 mg twice daily.  Discussed that she may need to go back on the Eliquis which she was taking after surgery before being transitioned to aspirin.  I did advise her to go to the ED immediately for further workup/treatment of the DVT as this could lead to PE or death.  Patient understands this.  Patient stated that she is not going to go to the ED today because the person who brought her has to get to work.  Patient states that she will start taking the aspirin 325 mg twice daily.  Again I strongly advised her to go to the ED immediately which she declined.  She has a follow-up scheduled in our office on 9/11/2024.

## 2024-08-28 NOTE — TELEPHONE ENCOUNTER
I called and spoke to the patient again.  She is aware from our conversation earlier today that she has a DVT in the right leg.  Patient states that she did have a refill on her Eliquis which she picked up today.  I advised her to start taking the Eliquis twice daily and she can stop taking the aspirin.  I also advised her to contact her PCP and cardiologist to make them aware of her DVT in the right leg and check if they have any other treatment recommendations.  She understands this.

## 2024-08-28 NOTE — PATIENT INSTRUCTIONS
Duplex ultrasound right lower extremity to rule out DVT.    Continue nonweightbearing right lower extremity.    Activities as tolerated with the right upper extremity.  Okay to stop wearing removable brace    Continue ice and elevation to right ankle/foot.    Continue aspirin for DVT prophylaxis.    Follow-up at regularly scheduled office visit on 9/11/2024 for reevaluation, x-rays and possible progression of weightbearing status.    Call if any questions or concerns.

## 2024-09-06 DIAGNOSIS — S52.514A CLOSED NONDISPLACED FRACTURE OF STYLOID PROCESS OF RIGHT RADIUS, INITIAL ENCOUNTER: ICD-10-CM

## 2024-09-06 DIAGNOSIS — S72.401D CLOSED FRACTURE OF DISTAL END OF RIGHT FEMUR WITH ROUTINE HEALING, UNSPECIFIED FRACTURE MORPHOLOGY, SUBSEQUENT ENCOUNTER: Primary | ICD-10-CM

## 2024-09-11 ENCOUNTER — HOSPITAL ENCOUNTER (OUTPATIENT)
Dept: GENERAL RADIOLOGY | Age: 69
Discharge: HOME | End: 2024-09-13
Payer: MEDICARE

## 2024-09-11 ENCOUNTER — OFFICE VISIT (OUTPATIENT)
Dept: ORTHOPEDIC SURGERY | Age: 69
End: 2024-09-11
Payer: MEDICARE

## 2024-09-11 VITALS — BODY MASS INDEX: 24.83 KG/M2 | HEIGHT: 65 IN | WEIGHT: 149 LBS

## 2024-09-11 DIAGNOSIS — Z91.199 NONCOMPLIANCE: ICD-10-CM

## 2024-09-11 DIAGNOSIS — S72.401D CLOSED FRACTURE OF DISTAL END OF RIGHT FEMUR WITH ROUTINE HEALING, UNSPECIFIED FRACTURE MORPHOLOGY, SUBSEQUENT ENCOUNTER: ICD-10-CM

## 2024-09-11 DIAGNOSIS — S52.514A CLOSED NONDISPLACED FRACTURE OF STYLOID PROCESS OF RIGHT RADIUS, INITIAL ENCOUNTER: ICD-10-CM

## 2024-09-11 DIAGNOSIS — S52.514A CLOSED NONDISPLACED FRACTURE OF STYLOID PROCESS OF RIGHT RADIUS, INITIAL ENCOUNTER: Primary | ICD-10-CM

## 2024-09-11 PROCEDURE — 99024 POSTOP FOLLOW-UP VISIT: CPT | Performed by: PHYSICIAN ASSISTANT

## 2024-09-11 PROCEDURE — 73552 X-RAY EXAM OF FEMUR 2/>: CPT

## 2024-09-11 PROCEDURE — 73110 X-RAY EXAM OF WRIST: CPT

## 2024-09-11 PROCEDURE — 99212 OFFICE O/P EST SF 10 MIN: CPT | Performed by: PHYSICIAN ASSISTANT

## 2024-09-11 RX ORDER — PHENOL 1.4 %
1 AEROSOL, SPRAY (ML) MUCOUS MEMBRANE DAILY
Qty: 30 TABLET | Refills: 3 | Status: SHIPPED | OUTPATIENT
Start: 2024-09-11

## 2024-09-11 RX ORDER — ERGOCALCIFEROL 1.25 MG/1
50000 CAPSULE, LIQUID FILLED ORAL WEEKLY
Qty: 12 CAPSULE | Refills: 1 | Status: SHIPPED | OUTPATIENT
Start: 2024-09-11

## 2024-09-13 ENCOUNTER — HOSPITAL ENCOUNTER (EMERGENCY)
Age: 69
Discharge: HOME | End: 2024-09-14
Attending: EMERGENCY MEDICINE
Payer: MEDICARE

## 2024-09-13 VITALS
OXYGEN SATURATION: 96 % | DIASTOLIC BLOOD PRESSURE: 75 MMHG | TEMPERATURE: 98 F | RESPIRATION RATE: 18 BRPM | HEART RATE: 79 BPM | SYSTOLIC BLOOD PRESSURE: 147 MMHG

## 2024-09-13 DIAGNOSIS — M79.89 LEG SWELLING: Primary | ICD-10-CM

## 2024-09-13 PROCEDURE — 99283 EMERGENCY DEPT VISIT LOW MDM: CPT

## 2024-09-13 ASSESSMENT — PAIN - FUNCTIONAL ASSESSMENT: PAIN_FUNCTIONAL_ASSESSMENT: 0-10

## 2024-09-13 ASSESSMENT — PAIN DESCRIPTION - DESCRIPTORS: DESCRIPTORS: ACHING

## 2024-09-13 ASSESSMENT — PAIN DESCRIPTION - LOCATION: LOCATION: LEG

## 2024-09-13 ASSESSMENT — PAIN SCALES - GENERAL: PAINLEVEL_OUTOF10: 4

## 2024-09-13 ASSESSMENT — PAIN DESCRIPTION - ORIENTATION: ORIENTATION: RIGHT

## 2024-09-14 ASSESSMENT — ENCOUNTER SYMPTOMS
VOMITING: 0
WHEEZING: 0
BACK PAIN: 0
DIARRHEA: 0
ABDOMINAL PAIN: 0
NAUSEA: 0
VOICE CHANGE: 0
COUGH: 0
TROUBLE SWALLOWING: 0
SHORTNESS OF BREATH: 0
PHOTOPHOBIA: 0

## 2024-09-16 ENCOUNTER — TELEPHONE (OUTPATIENT)
Dept: ORTHOPEDIC SURGERY | Age: 69
End: 2024-09-16

## 2024-09-16 ENCOUNTER — OFFICE VISIT (OUTPATIENT)
Dept: FAMILY MEDICINE CLINIC | Age: 69
End: 2024-09-16
Payer: MEDICARE

## 2024-09-16 VITALS
DIASTOLIC BLOOD PRESSURE: 62 MMHG | TEMPERATURE: 98.1 F | BODY MASS INDEX: 25.46 KG/M2 | SYSTOLIC BLOOD PRESSURE: 112 MMHG | HEART RATE: 77 BPM | OXYGEN SATURATION: 96 % | WEIGHT: 153 LBS

## 2024-09-16 DIAGNOSIS — L03.115 CELLULITIS OF RIGHT LEG: Primary | ICD-10-CM

## 2024-09-16 DIAGNOSIS — M79.604 PAIN OF RIGHT LOWER EXTREMITY: ICD-10-CM

## 2024-09-16 DIAGNOSIS — M79.89 LEG SWELLING: ICD-10-CM

## 2024-09-16 PROCEDURE — 1123F ACP DISCUSS/DSCN MKR DOCD: CPT | Performed by: PHYSICIAN ASSISTANT

## 2024-09-16 PROCEDURE — 99215 OFFICE O/P EST HI 40 MIN: CPT | Performed by: PHYSICIAN ASSISTANT

## 2024-09-16 RX ORDER — CEPHALEXIN 500 MG/1
500 CAPSULE ORAL 3 TIMES DAILY
Qty: 21 CAPSULE | Refills: 0 | Status: SHIPPED | OUTPATIENT
Start: 2024-09-16 | End: 2024-09-23

## 2024-09-20 ENCOUNTER — OFFICE VISIT (OUTPATIENT)
Dept: CARDIOLOGY CLINIC | Age: 69
End: 2024-09-20
Payer: MEDICARE

## 2024-09-20 VITALS
SYSTOLIC BLOOD PRESSURE: 117 MMHG | DIASTOLIC BLOOD PRESSURE: 62 MMHG | BODY MASS INDEX: 23.34 KG/M2 | HEART RATE: 73 BPM | RESPIRATION RATE: 16 BRPM | HEIGHT: 65 IN | WEIGHT: 140.1 LBS

## 2024-09-20 DIAGNOSIS — I50.9 CONGESTIVE HEART FAILURE OF UNKNOWN ETIOLOGY (HCC): Primary | ICD-10-CM

## 2024-09-20 PROCEDURE — 1123F ACP DISCUSS/DSCN MKR DOCD: CPT | Performed by: INTERNAL MEDICINE

## 2024-09-20 PROCEDURE — 93000 ELECTROCARDIOGRAM COMPLETE: CPT | Performed by: INTERNAL MEDICINE

## 2024-09-20 PROCEDURE — 99214 OFFICE O/P EST MOD 30 MIN: CPT | Performed by: INTERNAL MEDICINE

## 2024-09-20 RX ORDER — VERAPAMIL HYDROCHLORIDE 300 MG/1
300 CAPSULE, EXTENDED RELEASE ORAL NIGHTLY
COMMUNITY

## 2024-09-20 RX ORDER — DAPAGLIFLOZIN 10 MG/1
10 TABLET, FILM COATED ORAL EVERY MORNING
COMMUNITY

## 2024-09-25 ENCOUNTER — HOSPITAL ENCOUNTER (OUTPATIENT)
Dept: OTHER | Age: 69
Setting detail: THERAPIES SERIES
Discharge: HOME | End: 2024-09-25
Payer: MEDICARE

## 2024-09-25 ENCOUNTER — APPOINTMENT (OUTPATIENT)
Dept: OTHER | Age: 69
End: 2024-09-25
Payer: MEDICARE

## 2024-09-25 VITALS
SYSTOLIC BLOOD PRESSURE: 94 MMHG | DIASTOLIC BLOOD PRESSURE: 47 MMHG | WEIGHT: 142 LBS | BODY MASS INDEX: 23.63 KG/M2 | RESPIRATION RATE: 16 BRPM | HEART RATE: 81 BPM | OXYGEN SATURATION: 100 %

## 2024-09-25 LAB
ANION GAP SERPL CALCULATED.3IONS-SCNC: 9 MMOL/L (ref 7–16)
BNP SERPL-MCNC: 271 PG/ML (ref 0–125)
BUN SERPL-MCNC: 27 MG/DL (ref 6–23)
CALCIUM SERPL-MCNC: 9.7 MG/DL (ref 8.6–10.2)
CHLORIDE SERPL-SCNC: 103 MMOL/L (ref 98–107)
CO2 SERPL-SCNC: 27 MMOL/L (ref 22–29)
CREAT SERPL-MCNC: 1.3 MG/DL (ref 0.5–1)
GFR, ESTIMATED: 47 ML/MIN/1.73M2
GLUCOSE SERPL-MCNC: 152 MG/DL (ref 74–99)
POTASSIUM SERPL-SCNC: 4.2 MMOL/L (ref 3.5–5)
SODIUM SERPL-SCNC: 139 MMOL/L (ref 132–146)

## 2024-09-25 PROCEDURE — 99214 OFFICE O/P EST MOD 30 MIN: CPT

## 2024-09-25 PROCEDURE — 80048 BASIC METABOLIC PNL TOTAL CA: CPT

## 2024-09-25 PROCEDURE — 83880 ASSAY OF NATRIURETIC PEPTIDE: CPT

## 2024-09-25 RX ORDER — INSULIN PMP CART,AUT,G6/7,CNTR
EACH SUBCUTANEOUS
COMMUNITY

## 2024-09-30 DIAGNOSIS — E11.65 TYPE 2 DIABETES MELLITUS WITH HYPERGLYCEMIA, WITH LONG-TERM CURRENT USE OF INSULIN (HCC): ICD-10-CM

## 2024-09-30 DIAGNOSIS — Z79.4 TYPE 2 DIABETES MELLITUS WITH HYPERGLYCEMIA, WITH LONG-TERM CURRENT USE OF INSULIN (HCC): ICD-10-CM

## 2024-09-30 RX ORDER — PROCHLORPERAZINE 25 MG/1
SUPPOSITORY RECTAL
Qty: 9 EACH | Refills: 3 | Status: SHIPPED | OUTPATIENT
Start: 2024-09-30

## 2024-10-15 ENCOUNTER — HOSPITAL ENCOUNTER (OUTPATIENT)
Age: 69
Discharge: HOME | End: 2024-10-15
Payer: MEDICARE

## 2024-10-15 LAB
25(OH)D3 SERPL-MCNC: 62.9 NG/ML (ref 30–100)
ALBUMIN SERPL-MCNC: 4.5 G/DL (ref 3.5–5.2)
ALP SERPL-CCNC: 134 U/L (ref 35–104)
ALT SERPL-CCNC: 19 U/L (ref 0–32)
ANION GAP SERPL CALCULATED.3IONS-SCNC: 14 MMOL/L (ref 7–16)
AST SERPL-CCNC: 24 U/L (ref 0–31)
BACTERIA URNS QL MICRO: ABNORMAL
BASOPHILS # BLD: 0.06 K/UL (ref 0–0.2)
BASOPHILS NFR BLD: 1 % (ref 0–2)
BILIRUB SERPL-MCNC: 0.7 MG/DL (ref 0–1.2)
BILIRUB UR QL STRIP: NEGATIVE
BUN SERPL-MCNC: 37 MG/DL (ref 6–23)
CALCIUM SERPL-MCNC: 9.4 MG/DL (ref 8.6–10.2)
CHLORIDE SERPL-SCNC: 99 MMOL/L (ref 98–107)
CLARITY UR: CLEAR
CO2 SERPL-SCNC: 24 MMOL/L (ref 22–29)
COLOR UR: YELLOW
CREAT SERPL-MCNC: 1.2 MG/DL (ref 0.5–1)
CREAT UR-MCNC: 17.7 MG/DL (ref 29–226)
EOSINOPHIL # BLD: 0.22 K/UL (ref 0.05–0.5)
EOSINOPHILS RELATIVE PERCENT: 4 % (ref 0–6)
ERYTHROCYTE [DISTWIDTH] IN BLOOD BY AUTOMATED COUNT: 13.9 % (ref 11.5–15)
FERRITIN SERPL-MCNC: 81 NG/ML
GFR, ESTIMATED: 48 ML/MIN/1.73M2
GLUCOSE SERPL-MCNC: 123 MG/DL (ref 74–99)
GLUCOSE UR STRIP-MCNC: >=1000 MG/DL
HCT VFR BLD AUTO: 36.1 % (ref 34–48)
HGB BLD-MCNC: 11.2 G/DL (ref 11.5–15.5)
HGB UR QL STRIP.AUTO: NEGATIVE
IMM GRANULOCYTES # BLD AUTO: <0.03 K/UL (ref 0–0.58)
IMM GRANULOCYTES NFR BLD: 0 % (ref 0–5)
IRON SATN MFR SERPL: 31 % (ref 15–50)
IRON SERPL-MCNC: 82 UG/DL (ref 37–145)
KETONES UR STRIP-MCNC: NEGATIVE MG/DL
LEUKOCYTE ESTERASE UR QL STRIP: ABNORMAL
LYMPHOCYTES NFR BLD: 1.17 K/UL (ref 1.5–4)
LYMPHOCYTES RELATIVE PERCENT: 23 % (ref 20–42)
MAGNESIUM SERPL-MCNC: 2.5 MG/DL (ref 1.6–2.6)
MCH RBC QN AUTO: 29 PG (ref 26–35)
MCHC RBC AUTO-ENTMCNC: 31 G/DL (ref 32–34.5)
MCV RBC AUTO: 93.5 FL (ref 80–99.9)
MONOCYTES NFR BLD: 0.46 K/UL (ref 0.1–0.95)
MONOCYTES NFR BLD: 9 % (ref 2–12)
NEUTROPHILS NFR BLD: 62 % (ref 43–80)
NEUTS SEG NFR BLD: 3.2 K/UL (ref 1.8–7.3)
NITRITE UR QL STRIP: NEGATIVE
PH UR STRIP: 6 [PH] (ref 5–9)
PHOSPHATE SERPL-MCNC: 3.8 MG/DL (ref 2.5–4.5)
PLATELET # BLD AUTO: 298 K/UL (ref 130–450)
PMV BLD AUTO: 9.9 FL (ref 7–12)
POTASSIUM SERPL-SCNC: 4.1 MMOL/L (ref 3.5–5)
PROT SERPL-MCNC: 7 G/DL (ref 6.4–8.3)
PROT UR STRIP-MCNC: NEGATIVE MG/DL
PTH-INTACT SERPL-MCNC: 38 PG/ML (ref 15–65)
RBC # BLD AUTO: 3.86 M/UL (ref 3.5–5.5)
RBC #/AREA URNS HPF: ABNORMAL /HPF
SODIUM SERPL-SCNC: 137 MMOL/L (ref 132–146)
SP GR UR STRIP: 1.01 (ref 1–1.03)
TIBC SERPL-MCNC: 265 UG/DL (ref 250–450)
TOTAL PROTEIN, URINE: <4 MG/DL (ref 0–12)
URATE SERPL-MCNC: 5.3 MG/DL (ref 2.4–5.7)
URINE TOTAL PROTEIN CREATININE RATIO: ABNORMAL (ref 0–0.2)
UROBILINOGEN UR STRIP-ACNC: 0.2 EU/DL (ref 0–1)
WBC #/AREA URNS HPF: ABNORMAL /HPF
WBC OTHER # BLD: 5.1 K/UL (ref 4.5–11.5)

## 2024-10-15 PROCEDURE — 84156 ASSAY OF PROTEIN URINE: CPT

## 2024-10-15 PROCEDURE — 83550 IRON BINDING TEST: CPT

## 2024-10-15 PROCEDURE — 82306 VITAMIN D 25 HYDROXY: CPT

## 2024-10-15 PROCEDURE — 80053 COMPREHEN METABOLIC PANEL: CPT

## 2024-10-15 PROCEDURE — 85025 COMPLETE CBC W/AUTO DIFF WBC: CPT

## 2024-10-15 PROCEDURE — 83540 ASSAY OF IRON: CPT

## 2024-10-15 PROCEDURE — 84550 ASSAY OF BLOOD/URIC ACID: CPT

## 2024-10-15 PROCEDURE — 36415 COLL VENOUS BLD VENIPUNCTURE: CPT

## 2024-10-15 PROCEDURE — 81001 URINALYSIS AUTO W/SCOPE: CPT

## 2024-10-15 PROCEDURE — 84100 ASSAY OF PHOSPHORUS: CPT

## 2024-10-15 PROCEDURE — 83970 ASSAY OF PARATHORMONE: CPT

## 2024-10-15 PROCEDURE — 82570 ASSAY OF URINE CREATININE: CPT

## 2024-10-15 PROCEDURE — 83735 ASSAY OF MAGNESIUM: CPT

## 2024-10-15 PROCEDURE — 82728 ASSAY OF FERRITIN: CPT

## 2024-10-25 DIAGNOSIS — S52.514A CLOSED NONDISPLACED FRACTURE OF STYLOID PROCESS OF RIGHT RADIUS, INITIAL ENCOUNTER: Primary | ICD-10-CM

## 2024-10-25 DIAGNOSIS — S72.401D CLOSED FRACTURE OF DISTAL END OF RIGHT FEMUR WITH ROUTINE HEALING, UNSPECIFIED FRACTURE MORPHOLOGY, SUBSEQUENT ENCOUNTER: ICD-10-CM

## 2024-10-27 NOTE — PROCEDURES
[address]                  [address] [facility],  [facility]                                VASCULAR REPORT    PATIENT NAME: Nicole Rosales                       :        1955  MED REC NO:   [id number]                            ROOM:  ACCOUNT NO:   [de-identified]                           ADMIT DATE: 2022  PROVIDER:     Bridgett York MD    DATE OF PROCEDURE:  2022    INDICATIONS:  Bilateral carotid artery stenosis. Duplex scanning of the  right carotid artery revealed the patient has moderate plaque with peak  internal carotid velocity of 911, diastolic 46 cm/sec with plaque  causing 50% stenosis. Duplex scanning of the left carotid artery revealed moderate plaque with  peak internal carotid velocity of 90, diastolic 65 cm/sec with plaque  causing 60-70% stenosis. IMPRESSION:  Right carotid artery stenosis of 50% and left carotid  artery stenosis of 60-70%, unchanged from last year.         Aishwarya Vallecillo MD    D: 2022 16:54:23       T: 2022 16:57:42     [email or web address]  Job#: [id number]     Doc#: [id number]    CC:
Yes

## 2024-10-30 ENCOUNTER — HOSPITAL ENCOUNTER (OUTPATIENT)
Dept: GENERAL RADIOLOGY | Age: 69
Discharge: HOME | End: 2024-11-01
Payer: MEDICARE

## 2024-10-30 ENCOUNTER — OFFICE VISIT (OUTPATIENT)
Dept: ORTHOPEDIC SURGERY | Age: 69
End: 2024-10-30
Payer: MEDICARE

## 2024-10-30 DIAGNOSIS — S72.401D CLOSED FRACTURE OF DISTAL END OF RIGHT FEMUR WITH ROUTINE HEALING, UNSPECIFIED FRACTURE MORPHOLOGY, SUBSEQUENT ENCOUNTER: ICD-10-CM

## 2024-10-30 DIAGNOSIS — S52.514A CLOSED NONDISPLACED FRACTURE OF STYLOID PROCESS OF RIGHT RADIUS, INITIAL ENCOUNTER: ICD-10-CM

## 2024-10-30 DIAGNOSIS — S52.514A CLOSED NONDISPLACED FRACTURE OF STYLOID PROCESS OF RIGHT RADIUS, INITIAL ENCOUNTER: Primary | ICD-10-CM

## 2024-10-30 PROCEDURE — 73110 X-RAY EXAM OF WRIST: CPT

## 2024-10-30 PROCEDURE — 73552 X-RAY EXAM OF FEMUR 2/>: CPT

## 2024-10-30 NOTE — PATIENT INSTRUCTIONS
Diclofenac Gel (Voltaren Gel) has recently become available over the counter for purchase without a prescription.  This is the same strength and dosing as prescription strength.  If this medication is prescribed and the copay is too expensive it may be less to buy over the counter. Please ask your pharmacist.            [facility] and physical therapy for right distal femur fracture and right distal radius fracture and severe basal joint arthritis    Voltaren gel sent to pharmacy, if insurance does not cover please see generic forms above    Follow-up here in 4 weeks after CT of right femur to evaluate for fracture healing    Call sooner with any questions or concerns

## 2024-10-31 NOTE — PROGRESS NOTES
Chief Complaint   Patient presents with    Follow-up     3.5 mo f/u from R distal femur ORIF and non-op right radial styloid. Pt ambulating without assist. Pt states 8/10 pain.        SUBJECTIVE: Patient very pleasant 69-year-old female who is about 4-1/2 to 5 months out from a right distal femur open reduction internal fixation nonoperative treatment of her right wrist fracture.  Patient has multiple complaints most of which involve her right wrist.  She states she has stiffness and has not done formal therapy at this point in time.  She continues to wear her Velcro wrist brace.  Her right leg does have some aching in the mid thigh but states her knee is moving well.  She is very unhappy with her rehab that she was at states she is doing better since she has been home.  She denies any falls or injuries          Past Medical History:   Diagnosis Date    Anxiety     Bilateral carotid artery stenosis 10/07/2021    Bruit of left carotid artery 07/09/2020    CHF (congestive heart failure) (HCC)     Decreased dorsalis pedis pulse 07/09/2020    History of deep vein thrombosis 05/18/2023    Right peroneal vein, January 2023    Humeral fracture     right    Hx of blood clots     Hypertension     Kidney disease     Left carotid stenosis 07/09/2020    Leg pain     Osteoarthritis of right knee 05/04/2011    Superficial phlebitis of leg, right 08/12/2021    Type II or unspecified type diabetes mellitus without mention of complication, not stated as uncontrolled     Varicose veins of leg with pain, right 08/12/2021       Past Surgical History:   Procedure Laterality Date    APPENDECTOMY      CHOLECYSTECTOMY      COLONOSCOPY N/A 2/19/2024    COLORECTAL CANCER SCREENING, NOT HIGH RISK performed by Kary Hicks MD at [facility] ENDOSCOPY    FEMUR SURGERY Right 6/13/2024    FEMUR OPEN REDUCTION INTERNAL FIXATION RIGHT DISTAL PERIPROSTHETIC FRACTURE performed by Nathan Angeles MD at [facility] OR    FRACTURE SURGERY Right     arm

## 2024-11-01 ENCOUNTER — TELEPHONE (OUTPATIENT)
Dept: ORTHOPEDIC SURGERY | Age: 69
End: 2024-11-01

## 2024-11-01 DIAGNOSIS — S52.514A CLOSED NONDISPLACED FRACTURE OF STYLOID PROCESS OF RIGHT RADIUS, INITIAL ENCOUNTER: Primary | ICD-10-CM

## 2024-11-01 NOTE — TELEPHONE ENCOUNTER
[facility] called. They need a separate OT referral to treat the pt's R wrist Faxed to  #[phone]

## 2024-11-25 ENCOUNTER — OFFICE VISIT (OUTPATIENT)
Dept: ENDOCRINOLOGY | Age: 69
End: 2024-11-25
Payer: MEDICARE

## 2024-11-25 VITALS — BODY MASS INDEX: 24.66 KG/M2 | HEIGHT: 65 IN | WEIGHT: 148 LBS

## 2024-11-25 DIAGNOSIS — E11.65 TYPE 2 DIABETES MELLITUS WITH HYPERGLYCEMIA, WITH LONG-TERM CURRENT USE OF INSULIN (HCC): Primary | ICD-10-CM

## 2024-11-25 DIAGNOSIS — Z79.4 TYPE 2 DIABETES MELLITUS WITH HYPERGLYCEMIA, WITH LONG-TERM CURRENT USE OF INSULIN (HCC): Primary | ICD-10-CM

## 2024-11-25 DIAGNOSIS — E11.42 DIABETIC POLYNEUROPATHY ASSOCIATED WITH TYPE 2 DIABETES MELLITUS (HCC): ICD-10-CM

## 2024-11-25 DIAGNOSIS — N18.31 STAGE 3A CHRONIC KIDNEY DISEASE (HCC): ICD-10-CM

## 2024-11-25 LAB — HBA1C MFR BLD: 6.8 %

## 2024-11-25 PROCEDURE — 3044F HG A1C LEVEL LT 7.0%: CPT | Performed by: NURSE PRACTITIONER

## 2024-11-25 PROCEDURE — 1159F MED LIST DOCD IN RCRD: CPT | Performed by: NURSE PRACTITIONER

## 2024-11-25 PROCEDURE — 95251 CONT GLUC MNTR ANALYSIS I&R: CPT | Performed by: NURSE PRACTITIONER

## 2024-11-25 PROCEDURE — 99214 OFFICE O/P EST MOD 30 MIN: CPT | Performed by: NURSE PRACTITIONER

## 2024-11-25 PROCEDURE — 83036 HEMOGLOBIN GLYCOSYLATED A1C: CPT | Performed by: NURSE PRACTITIONER

## 2024-11-25 PROCEDURE — 1123F ACP DISCUSS/DSCN MKR DOCD: CPT | Performed by: NURSE PRACTITIONER

## 2024-11-25 RX ORDER — PROCHLORPERAZINE 25 MG/1
SUPPOSITORY RECTAL
Qty: 1 EACH | Refills: 3 | Status: SHIPPED | OUTPATIENT
Start: 2024-11-25

## 2024-11-25 RX ORDER — PROCHLORPERAZINE 25 MG/1
SUPPOSITORY RECTAL
Qty: 9 EACH | Refills: 4 | Status: SHIPPED | OUTPATIENT
Start: 2024-11-25

## 2024-11-25 RX ORDER — INSULIN LISPRO 100 [IU]/ML
INJECTION, SOLUTION INTRAVENOUS; SUBCUTANEOUS
Qty: 90 ML | Refills: 3 | Status: SHIPPED | OUTPATIENT
Start: 2024-11-25

## 2024-11-25 NOTE — PROGRESS NOTES
[employer]  [facility] Department of Endocrinology Diabetes and Metabolism   [address], [place] [address]  Phone: [phone]  Fax: [phone]    Date of Service: 11/25/2024    Primary Care Physician: Edgardo Colon MD  Referring physician: No ref. provider found  Provider: NEWTON Aelx NP     Reason for the visit:    DM Type 2     History of Present Illness:  The history is provided by the patient. No  was used. Accuracy of the patient data is excellent. .  She was recently admitted about 1 month ago for CHF and was incidentally found to have a RLE DVT.     Sandhya Adame is a very pleasant 69 y.o. female seen today for diabetes management.  She sustained a fall at home and fractured her right femur and right arm and is currently at [facility] for rehabilitation and plan is to go back home     Noted to have Open reduction internal fixation right severely comminuted femoral shaft fracture around a total knee arthroplasty with retrograde femoral nail and right distal radius fracture    Sandhya Adame was diagnosed with diabetes at age 21-22 .   She has been on insulin since dx and started OmniPOd and Dexcom on 3/28/24 and  [id number] mg daily    Pump settings:  Basal 0.6, CR 14, ISF 58, AIT 3 hrs, BS goal 120-120      Checking BS with Dexcom:  TIR 80%  Hyperglycemia 18%  Lows 2%  GMI 6.7%    Avg   TDD 28.3 units     Most recent A1c results summarized below  Lab Results   Component Value Date/Time    LABA1C 6.9 06/14/2024 11:26 PM    LABA1C 7.7 04/16/2024 11:46 AM    LABA1C 7.4 01/11/2024 02:15 PM     Patient reported rare hypoglycemic episodes likely due to incorrect carb counting  The patient has been mindful of what has been eating and following diabetic diet as encouraged  When at home her eating habits:  She eats 3 meals a day (low K+ and NA+ diet)  For breakfast - eggs with cheese, wheat toast (SF jello)  For lunch

## 2024-11-26 ENCOUNTER — TELEPHONE (OUTPATIENT)
Dept: ORTHOPEDIC SURGERY | Age: 69
End: 2024-11-26

## 2024-12-03 DIAGNOSIS — S72.401D CLOSED FRACTURE OF DISTAL END OF RIGHT FEMUR WITH ROUTINE HEALING, UNSPECIFIED FRACTURE MORPHOLOGY, SUBSEQUENT ENCOUNTER: Primary | ICD-10-CM

## 2024-12-20 RX ORDER — PROCHLORPERAZINE 25 MG/1
SUPPOSITORY RECTAL
Qty: 1 EACH | Refills: 3 | Status: SHIPPED | OUTPATIENT
Start: 2024-12-20

## (undated) DEVICE — SOLUTION IRRIG 1000ML 0.9% SOD CHL USP POUR PLAS BTL

## (undated) DEVICE — SOLUTION IRRIG 1000ML STRL H2O USP PLAS POUR BTL

## (undated) DEVICE — ROD RMR L950MM DIA2.5MM W/ EXTN BALL TIP

## (undated) DEVICE — ELECTRODE PT RET AD L9FT HI MOIST COND ADH HYDRGEL CORDED

## (undated) DEVICE — DRESSING HYDROFIBER AQUACEL AG ADVANTAGE 3.5X6 IN

## (undated) DEVICE — CATHETER F BLLN 5CC 16FR 2 W HYDRGEL COAT LESS TRAUM LUB

## (undated) DEVICE — Device

## (undated) DEVICE — GOWN,AURORA,BRTHSLV,2XL,18/CS: Brand: MEDLINE

## (undated) DEVICE — DRAPE EQUIP CARM 72X42 IN RUBBER BND CLP

## (undated) DEVICE — BIT DRILL CANN 11.2 LRG QC

## (undated) DEVICE — TOTAL TRAY, DB, 100% SILI FOLEY, 16FR 10: Brand: MEDLINE

## (undated) DEVICE — FORCEPS BX OVL CUP FEN DISPOSABLE CAP L 160CM RAD JAW 4

## (undated) DEVICE — BLOCK BITE 60FR RUBBER ADLT DENTAL

## (undated) DEVICE — SPONGE GZ W4XL4IN RAYON POLY CVR W/NONWOVEN FAB STRL 2/PK

## (undated) DEVICE — BIT DRL L145MM DIA4.2MM ST 3 FLUT NDL PNT QUIK CPL FOR FEM

## (undated) DEVICE — LOWER EXT HIP DRAPE: Brand: MEDLINE INDUSTRIES, INC.

## (undated) DEVICE — GLOVE ORANGE PI 8   MSG9080

## (undated) DEVICE — GRADUATE TRIANG MEASURE 1000ML BLK PRNT

## (undated) DEVICE — DRESSING HYDROFIBER AQUACEL AG ADVANTAGE 3.5X14 IN

## (undated) DEVICE — GLOVE ORANGE PI 8 1/2   MSG9085

## (undated) DEVICE — TUBING SUCT 12FR MAL ALUM SHFT FN CAP VENT UNIV CONN W/ OBT

## (undated) DEVICE — GUIDEWIRE ORTH L400MM DIA3.2MM FOR TFN

## (undated) DEVICE — GLOVE SURG SZ 65 THK91MIL LTX FREE SYN POLYISOPRENE

## (undated) DEVICE — BANDAGE COMPR W4INXL10YD WHITE/BEIGE E MTRX HK LOOP CLSR